# Patient Record
Sex: FEMALE | Race: BLACK OR AFRICAN AMERICAN | NOT HISPANIC OR LATINO | URBAN - METROPOLITAN AREA
[De-identification: names, ages, dates, MRNs, and addresses within clinical notes are randomized per-mention and may not be internally consistent; named-entity substitution may affect disease eponyms.]

---

## 2017-06-07 ENCOUNTER — EMERGENCY (EMERGENCY)
Facility: HOSPITAL | Age: 22
LOS: 1 days | Discharge: HOME | End: 2017-06-07
Attending: EMERGENCY MEDICINE | Admitting: EMERGENCY MEDICINE
Payer: MEDICAID

## 2017-06-07 VITALS
OXYGEN SATURATION: 100 % | SYSTOLIC BLOOD PRESSURE: 146 MMHG | TEMPERATURE: 99 F | RESPIRATION RATE: 18 BRPM | DIASTOLIC BLOOD PRESSURE: 98 MMHG | HEART RATE: 95 BPM

## 2017-06-07 VITALS
DIASTOLIC BLOOD PRESSURE: 99 MMHG | SYSTOLIC BLOOD PRESSURE: 150 MMHG | OXYGEN SATURATION: 100 % | TEMPERATURE: 99 F | HEART RATE: 100 BPM | RESPIRATION RATE: 16 BRPM

## 2017-06-07 PROBLEM — Z00.00 ENCOUNTER FOR PREVENTIVE HEALTH EXAMINATION: Status: ACTIVE | Noted: 2017-06-07

## 2017-06-07 LAB
ALBUMIN SERPL ELPH-MCNC: 4.3 G/DL — SIGNIFICANT CHANGE UP (ref 3.3–5)
ALP SERPL-CCNC: 47 U/L — SIGNIFICANT CHANGE UP (ref 40–120)
ALT FLD-CCNC: 21 U/L — SIGNIFICANT CHANGE UP (ref 4–33)
APPEARANCE UR: CLEAR — SIGNIFICANT CHANGE UP
AST SERPL-CCNC: 27 U/L — SIGNIFICANT CHANGE UP (ref 4–32)
BASOPHILS # BLD AUTO: 0.02 K/UL — SIGNIFICANT CHANGE UP (ref 0–0.2)
BASOPHILS NFR BLD AUTO: 0.3 % — SIGNIFICANT CHANGE UP (ref 0–2)
BILIRUB SERPL-MCNC: 0.7 MG/DL — SIGNIFICANT CHANGE UP (ref 0.2–1.2)
BILIRUB UR-MCNC: NEGATIVE — SIGNIFICANT CHANGE UP
BLOOD UR QL VISUAL: NEGATIVE — SIGNIFICANT CHANGE UP
BUN SERPL-MCNC: 11 MG/DL — SIGNIFICANT CHANGE UP (ref 7–23)
CALCIUM SERPL-MCNC: 9.6 MG/DL — SIGNIFICANT CHANGE UP (ref 8.4–10.5)
CHLORIDE SERPL-SCNC: 103 MMOL/L — SIGNIFICANT CHANGE UP (ref 98–107)
CO2 SERPL-SCNC: 21 MMOL/L — LOW (ref 22–31)
COLOR SPEC: YELLOW — SIGNIFICANT CHANGE UP
CREAT SERPL-MCNC: 0.82 MG/DL — SIGNIFICANT CHANGE UP (ref 0.5–1.3)
EOSINOPHIL # BLD AUTO: 0.01 K/UL — SIGNIFICANT CHANGE UP (ref 0–0.5)
EOSINOPHIL NFR BLD AUTO: 0.2 % — SIGNIFICANT CHANGE UP (ref 0–6)
GLUCOSE SERPL-MCNC: 108 MG/DL — HIGH (ref 70–99)
GLUCOSE UR-MCNC: NEGATIVE — SIGNIFICANT CHANGE UP
HCT VFR BLD CALC: 39.9 % — SIGNIFICANT CHANGE UP (ref 34.5–45)
HGB BLD-MCNC: 13.1 G/DL — SIGNIFICANT CHANGE UP (ref 11.5–15.5)
IMM GRANULOCYTES NFR BLD AUTO: 0.2 % — SIGNIFICANT CHANGE UP (ref 0–1.5)
KETONES UR-MCNC: SIGNIFICANT CHANGE UP
LEUKOCYTE ESTERASE UR-ACNC: NEGATIVE — SIGNIFICANT CHANGE UP
LIDOCAIN IGE QN: 42.6 U/L — SIGNIFICANT CHANGE UP (ref 7–60)
LYMPHOCYTES # BLD AUTO: 1.66 K/UL — SIGNIFICANT CHANGE UP (ref 1–3.3)
LYMPHOCYTES # BLD AUTO: 25.2 % — SIGNIFICANT CHANGE UP (ref 13–44)
MAGNESIUM SERPL-MCNC: 1.8 MG/DL — SIGNIFICANT CHANGE UP (ref 1.6–2.6)
MCHC RBC-ENTMCNC: 32.1 PG — SIGNIFICANT CHANGE UP (ref 27–34)
MCHC RBC-ENTMCNC: 32.8 % — SIGNIFICANT CHANGE UP (ref 32–36)
MCV RBC AUTO: 97.8 FL — SIGNIFICANT CHANGE UP (ref 80–100)
MONOCYTES # BLD AUTO: 0.51 K/UL — SIGNIFICANT CHANGE UP (ref 0–0.9)
MONOCYTES NFR BLD AUTO: 7.7 % — SIGNIFICANT CHANGE UP (ref 2–14)
MUCOUS THREADS # UR AUTO: SIGNIFICANT CHANGE UP
NEUTROPHILS # BLD AUTO: 4.39 K/UL — SIGNIFICANT CHANGE UP (ref 1.8–7.4)
NEUTROPHILS NFR BLD AUTO: 66.4 % — SIGNIFICANT CHANGE UP (ref 43–77)
NITRITE UR-MCNC: NEGATIVE — SIGNIFICANT CHANGE UP
PH UR: 5.5 — SIGNIFICANT CHANGE UP (ref 4.6–8)
PHOSPHATE SERPL-MCNC: 4 MG/DL — SIGNIFICANT CHANGE UP (ref 2.5–4.5)
PLATELET # BLD AUTO: 256 K/UL — SIGNIFICANT CHANGE UP (ref 150–400)
PMV BLD: 9.4 FL — SIGNIFICANT CHANGE UP (ref 7–13)
POTASSIUM SERPL-MCNC: 4 MMOL/L — SIGNIFICANT CHANGE UP (ref 3.5–5.3)
POTASSIUM SERPL-SCNC: 4 MMOL/L — SIGNIFICANT CHANGE UP (ref 3.5–5.3)
PROT SERPL-MCNC: 7.5 G/DL — SIGNIFICANT CHANGE UP (ref 6–8.3)
PROT UR-MCNC: 20 — SIGNIFICANT CHANGE UP
RBC # BLD: 4.08 M/UL — SIGNIFICANT CHANGE UP (ref 3.8–5.2)
RBC # FLD: 12.3 % — SIGNIFICANT CHANGE UP (ref 10.3–14.5)
RBC CASTS # UR COMP ASSIST: SIGNIFICANT CHANGE UP (ref 0–?)
SODIUM SERPL-SCNC: 140 MMOL/L — SIGNIFICANT CHANGE UP (ref 135–145)
SP GR SPEC: 1.03 — SIGNIFICANT CHANGE UP (ref 1–1.03)
SQUAMOUS # UR AUTO: SIGNIFICANT CHANGE UP
UROBILINOGEN FLD QL: NORMAL E.U. — SIGNIFICANT CHANGE UP (ref 0.1–0.2)
WBC # BLD: 6.6 K/UL — SIGNIFICANT CHANGE UP (ref 3.8–10.5)
WBC # FLD AUTO: 6.6 K/UL — SIGNIFICANT CHANGE UP (ref 3.8–10.5)
WBC UR QL: SIGNIFICANT CHANGE UP (ref 0–?)

## 2017-06-07 PROCEDURE — 99284 EMERGENCY DEPT VISIT MOD MDM: CPT | Mod: 25

## 2017-06-07 RX ORDER — FAMOTIDINE 10 MG/ML
20 INJECTION INTRAVENOUS ONCE
Qty: 0 | Refills: 0 | Status: COMPLETED | OUTPATIENT
Start: 2017-06-07 | End: 2017-06-07

## 2017-06-07 RX ORDER — LIDOCAINE 4 G/100G
10 CREAM TOPICAL ONCE
Qty: 0 | Refills: 0 | Status: COMPLETED | OUTPATIENT
Start: 2017-06-07 | End: 2017-06-07

## 2017-06-07 RX ORDER — ONDANSETRON 8 MG/1
4 TABLET, FILM COATED ORAL ONCE
Qty: 0 | Refills: 0 | Status: COMPLETED | OUTPATIENT
Start: 2017-06-07 | End: 2017-06-07

## 2017-06-07 RX ORDER — SODIUM CHLORIDE 9 MG/ML
1000 INJECTION INTRAMUSCULAR; INTRAVENOUS; SUBCUTANEOUS ONCE
Qty: 0 | Refills: 0 | Status: COMPLETED | OUTPATIENT
Start: 2017-06-07 | End: 2017-06-07

## 2017-06-07 RX ADMIN — SODIUM CHLORIDE 2000 MILLILITER(S): 9 INJECTION INTRAMUSCULAR; INTRAVENOUS; SUBCUTANEOUS at 03:09

## 2017-06-07 RX ADMIN — ONDANSETRON 4 MILLIGRAM(S): 8 TABLET, FILM COATED ORAL at 03:09

## 2017-06-07 RX ADMIN — LIDOCAINE 10 MILLILITER(S): 4 CREAM TOPICAL at 03:09

## 2017-06-07 RX ADMIN — Medication 30 MILLILITER(S): at 03:09

## 2017-06-07 RX ADMIN — FAMOTIDINE 20 MILLIGRAM(S): 10 INJECTION INTRAVENOUS at 03:09

## 2017-06-07 NOTE — ED PROVIDER NOTE - PLAN OF CARE
1. Your lab work was normal  2. Please continue to take pepcid and use maalox as needed  3. Please follow up with your GI doctor within a few days of discharge  4. If your symptoms persist or worsen please seek immediate medical care.

## 2017-06-07 NOTE — ED PROVIDER NOTE - CARE PLAN
Principal Discharge DX:	Gastritis  Instructions for follow-up, activity and diet:	1. Your lab work was normal  2. Please continue to take pepcid and use maalox as needed  3. Please follow up with your GI doctor within a few days of discharge  4. If your symptoms persist or worsen please seek immediate medical care.

## 2017-06-07 NOTE — ED PROVIDER NOTE - MEDICAL DECISION MAKING DETAILS
22F p/w worsening abdominal pain. DDx includes viral gastroenteritis, gastritis, less likely pancreatitis or appendicitis. IVF, Labs, GI cocktail

## 2017-06-07 NOTE — ED ADULT NURSE NOTE - OBJECTIVE STATEMENT
pt presents to ED R#26 Aox4, in NAD, c/o LUQ pain x2 weeks sharp constant nonradiating with 1 episode nausea and vomiting 2 weeks ago, resolved, still c/o nausea. States pain relieved by pepcid (rx by PMD) and application of heating pads. Denies fevers/ chills/ other acute medical complaints. VSS. IV inserted, BW collected and sent to lab. Meds administered as per EMAR. Awaiting test results. Will continue to monitor.

## 2017-06-07 NOTE — ED PROVIDER NOTE - ATTENDING CONTRIBUTION TO CARE
DR. ISSA, ATTENDING MD-  I performed a face to face bedside interview with patient regarding history of present illness, review of symptoms and past medical history. I completed an independent physical exam.  I have discussed patient's plan of care with the resident.   Documentation as above in the note.    23 y/o female c/o 2 wks of epigastric pain, nausea, no v/d.  Taking pepcid with little relief.  Worse when laying flat.  Denies f/c, ha, neck stiffness, cp, sob, cough, dysuria, rash.  Afebrile, vs wnl, nad, ctabil, s1s2 rrr no m/r/g, abd soft mild epig ttp no r/g, no cva tenderness b/l, no leg swelling b/l, no rash.  Gastritis vs gerd vs less likely pancreatitis.  Obtain cbc, cmp, lipase, upreg, give gi cocktail, reassess, antic dc home with close gi f/u.

## 2017-06-07 NOTE — ED PROVIDER NOTE - PROGRESS NOTE DETAILS
Pt states symptoms have improved. Labs are stable and pt to be dc'ed with out pt GI follow up. Mother and pt in agreement with plan.

## 2017-06-07 NOTE — ED ADULT TRIAGE NOTE - CHIEF COMPLAINT QUOTE
Pt c/o L abd pain x2 weeks.  Saw PMD and was given pepcid with some relief.  +nausea.  Denies vomiting or diarrhea.  Last BM this am.  States tea has helped her.  Appears comfortable presently

## 2017-06-07 NOTE — ED PROVIDER NOTE - OBJECTIVE STATEMENT
This is a 22F with no sig pmhx who p/w 2 weeks of worsening abdominal pain. Pt states initially had episode of vomiting with some blood noted. Pt also reports diarrhea which as resolved. She states went to her PMD and took pepcid with some relief but symptoms worsened so came to ED. She also states that today she feels like she has something stuck and cannot swallow. She denies fevers, chills, cp, sob, recent travel or sick contacts.

## 2021-10-27 ENCOUNTER — EMERGENCY (EMERGENCY)
Facility: HOSPITAL | Age: 26
LOS: 1 days | Discharge: HOME | End: 2021-10-27
Attending: STUDENT IN AN ORGANIZED HEALTH CARE EDUCATION/TRAINING PROGRAM | Admitting: STUDENT IN AN ORGANIZED HEALTH CARE EDUCATION/TRAINING PROGRAM
Payer: MEDICAID

## 2021-10-27 VITALS
TEMPERATURE: 98 F | HEART RATE: 90 BPM | RESPIRATION RATE: 18 BRPM | OXYGEN SATURATION: 100 % | SYSTOLIC BLOOD PRESSURE: 141 MMHG | DIASTOLIC BLOOD PRESSURE: 95 MMHG

## 2021-10-27 PROCEDURE — 74018 RADEX ABDOMEN 1 VIEW: CPT | Mod: 26

## 2021-10-27 PROCEDURE — 71046 X-RAY EXAM CHEST 2 VIEWS: CPT | Mod: 26

## 2021-10-27 PROCEDURE — 99284 EMERGENCY DEPT VISIT MOD MDM: CPT | Mod: 25

## 2021-10-27 PROCEDURE — 93010 ELECTROCARDIOGRAM REPORT: CPT

## 2021-10-27 NOTE — ED PROVIDER NOTE - PHYSICAL EXAMINATION
CONSTITUTIONAL: Non-toxic, non-diaphoretic, in no apparent distress  HEAD: Normocephalic; atruamatic  EYES: EOM intact   ENMT: External appears normal; normal oropharynx, moist  NECK: grossly normal active ROM,  CARD: No cyanosis, good peripheral perfusion, RRR  RESP: Normal chest excursion with respiration; no increased work of breathing  ABD: soft, nontender, and non-distended   EXT: moving all extremities, no gross disfigurement or asymmetry,  SKIN: Warm, dry, no rash  NEURO:  moving all extremities, no facial droop, no dysarthria      cn2-12 intact  5/5 all extremities

## 2021-10-27 NOTE — ED ADULT TRIAGE NOTE - CHIEF COMPLAINT QUOTE
pt c/o difficulty swallowing and belching x months. has seen multiple ENT and GI doctors, states unable to figure out what the problem is. pt reports weight loss.

## 2021-10-27 NOTE — ED PROVIDER NOTE - OBJECTIVE STATEMENT
27 y/o F presents to the ED with a cc of difficulty swallowing since April. Pt describes the feeling as it getting stopped midway. Pt also states she has been experiencing excessive burping, and acid coming up. Pt says softer foods make it worse, but solid is easier to go down, and liquids go down fine. Pt has met with GI, who conducted endoscopy, and two ENTs, who conducted a scope. Both results were normal. She is taking Omeprazole, prescribed by GI, which didn't help much, and is taking Sucralfate prescribed by ENT, which didn't result in desirable results. She is also taking Famotidine at night and Iron supplements. Patient denies oral contraceptives, smoking, drinking, drug use, fever, cough, diarrhea, and vomiting. Patient also states tightness on chest which comes and goes.

## 2021-10-27 NOTE — ED PROVIDER NOTE - CARE PROVIDER_API CALL
Ines Barker)  Gastroenterology; Internal Medicine  [address] [place] [address], [address]  [place] [address]  Phone: [phone]  Fax: [phone]  Follow Up Time: Urgent

## 2021-10-27 NOTE — ED PROVIDER NOTE - NSFOLLOWUPINSTRUCTIONS_ED_ALL_ED_FT
follow up with Dr. Barker as below.       Chronic Dysphagia    WHAT YOU NEED TO KNOW:    Chronic dysphagia is trouble swallowing. It occurs when you have trouble moving food or liquid down your esophagus to your stomach. It may occur when you eat, drink, or any time you try to swallow.    WHILE YOU ARE HERE:    Informed consent is a legal document that explains the tests, treatments, or procedures that you may need. Informed consent means you understand what will be done and can make decisions about what you want. You give your permission when you sign the consent form. You can have someone sign this form for you if you are not able to sign it. You have the right to understand your medical care in words you know. Before you sign the consent form, understand the risks and benefits of what will be done. Make sure all your questions are answered.    Medicines: You may need medicine to reduce acid reflux or muscle spasms in your throat.    Tests:    A water swallow screening test will show how well you swallow thinner liquids, such as water. Thinner liquids can make you choke more easily than thicker liquids. This test may show signs of dysphagia and aspiration. It can be used to help healthcare providers decide if you need other tests.    Other swallow tests may show which parts of your throat or esophagus are not working well. These tests may include x-rays of your throat and esophagus. You may be given a thick liquid called barium to help your esophagus show up better on x-rays. These tests may also show if the position of your head affects the way you swallow.    Endoscopy is a procedure that may show narrowing or inflammation in your esophagus.    Manometry measures the pressure within the esophagus and stomach.    pH monitoring is used to check your throat for acid reflux.  Treatment: Surgery may be needed to widen your esophagus or treat other medical conditions that cause dysphagia.    RISKS:    You have an increased risk for aspiration (movement of liquid and food into your lungs). Aspiration can cause frequent colds or lung infections. You may not get the nutrition you need. You may also become dehydrated. Your symptoms may become more severe or life-threatening.    CARE AGREEMENT:    You have the right to help plan your care. Learn about your health condition and how it may be treated. Discuss treatment options with your healthcare providers to decide what care you want to receive. You always have the right to refuse treatment.

## 2021-10-27 NOTE — ED PROVIDER NOTE - CLINICAL SUMMARY MEDICAL DECISION MAKING FREE TEXT BOX
25 y/o F presents to the ED with dysphagia since April. Will check EKG, chest X-ray, and outpatient followup for manometry.

## 2021-10-27 NOTE — ED PROVIDER NOTE - PATIENT PORTAL LINK FT
You can access the FollowMyHealth Patient Portal offered by [facility] by registering at the following website: [email or web address]. By joining [employer]’s FollowMyHealth portal, you will also be able to view your health information using other applications (apps) compatible with our system.

## 2022-02-02 ENCOUNTER — APPOINTMENT (OUTPATIENT)
Dept: GASTROENTEROLOGY | Facility: CLINIC | Age: 27
End: 2022-02-02

## 2022-04-29 NOTE — ED ADULT TRIAGE NOTE - PATIENT ON (OXYGEN DELIVERY METHOD)
What Type Of Note Output Would You Prefer (Optional)?: Bullet Format Hpi Title: Evaluation of Skin Lesions Additional History: Acne, cyst by left ear room air

## 2024-11-10 ENCOUNTER — EMERGENCY (EMERGENCY)
Facility: HOSPITAL | Age: 29
LOS: 1 days | Discharge: HOME | End: 2024-11-10
Attending: STUDENT IN AN ORGANIZED HEALTH CARE EDUCATION/TRAINING PROGRAM | Admitting: STUDENT IN AN ORGANIZED HEALTH CARE EDUCATION/TRAINING PROGRAM
Payer: COMMERCIAL

## 2024-11-10 VITALS
DIASTOLIC BLOOD PRESSURE: 112 MMHG | TEMPERATURE: 100 F | HEIGHT: 67 IN | SYSTOLIC BLOOD PRESSURE: 165 MMHG | HEART RATE: 122 BPM | OXYGEN SATURATION: 95 % | WEIGHT: 205.03 LBS | RESPIRATION RATE: 16 BRPM

## 2024-11-10 VITALS
HEART RATE: 108 BPM | DIASTOLIC BLOOD PRESSURE: 100 MMHG | TEMPERATURE: 99 F | RESPIRATION RATE: 16 BRPM | SYSTOLIC BLOOD PRESSURE: 153 MMHG | OXYGEN SATURATION: 100 %

## 2024-11-10 LAB
ALBUMIN SERPL ELPH-MCNC: 3.3 G/DL — SIGNIFICANT CHANGE UP (ref 3.3–5)
ALP SERPL-CCNC: 55 U/L — SIGNIFICANT CHANGE UP (ref 40–120)
ALT FLD-CCNC: 10 U/L — SIGNIFICANT CHANGE UP (ref 4–33)
ANION GAP SERPL CALC-SCNC: 12 MMOL/L — SIGNIFICANT CHANGE UP (ref 7–14)
ANISOCYTOSIS BLD QL: SLIGHT — SIGNIFICANT CHANGE UP
APPEARANCE UR: ABNORMAL
AST SERPL-CCNC: 34 U/L — HIGH (ref 4–32)
BACTERIA # UR AUTO: ABNORMAL /HPF
BASOPHILS # BLD AUTO: 0 K/UL — SIGNIFICANT CHANGE UP (ref 0–0.2)
BASOPHILS NFR BLD AUTO: 0 % — SIGNIFICANT CHANGE UP (ref 0–2)
BILIRUB SERPL-MCNC: 0.6 MG/DL — SIGNIFICANT CHANGE UP (ref 0.2–1.2)
BILIRUB UR-MCNC: NEGATIVE — SIGNIFICANT CHANGE UP
BUN SERPL-MCNC: 8 MG/DL — SIGNIFICANT CHANGE UP (ref 7–23)
CALCIUM SERPL-MCNC: 8.6 MG/DL — SIGNIFICANT CHANGE UP (ref 8.4–10.5)
CAST: 24 /LPF — HIGH (ref 0–4)
CHLORIDE SERPL-SCNC: 103 MMOL/L — SIGNIFICANT CHANGE UP (ref 98–107)
CO2 SERPL-SCNC: 24 MMOL/L — SIGNIFICANT CHANGE UP (ref 22–31)
COLOR SPEC: ABNORMAL
CREAT SERPL-MCNC: 0.97 MG/DL — SIGNIFICANT CHANGE UP (ref 0.5–1.3)
D DIMER BLD IA.RAPID-MCNC: 1299 NG/ML DDU — HIGH
DIFF PNL FLD: ABNORMAL
EGFR: 80 ML/MIN/1.73M2 — SIGNIFICANT CHANGE UP
EOSINOPHIL # BLD AUTO: 0 K/UL — SIGNIFICANT CHANGE UP (ref 0–0.5)
EOSINOPHIL NFR BLD AUTO: 0 % — SIGNIFICANT CHANGE UP (ref 0–6)
FINE GRAN CASTS #/AREA URNS AUTO: PRESENT
FLUAV AG NPH QL: SIGNIFICANT CHANGE UP
FLUBV AG NPH QL: SIGNIFICANT CHANGE UP
GAS PNL BLDV: SIGNIFICANT CHANGE UP
GLUCOSE SERPL-MCNC: 107 MG/DL — HIGH (ref 70–99)
GLUCOSE UR QL: NEGATIVE MG/DL — SIGNIFICANT CHANGE UP
HCG SERPL-ACNC: <1 MIU/ML — SIGNIFICANT CHANGE UP
HCT VFR BLD CALC: 29.9 % — LOW (ref 34.5–45)
HGB BLD-MCNC: 9.7 G/DL — LOW (ref 11.5–15.5)
HYALINE CASTS # UR AUTO: PRESENT
IANC: 4.8 K/UL — SIGNIFICANT CHANGE UP (ref 1.8–7.4)
KETONES UR-MCNC: NEGATIVE MG/DL — SIGNIFICANT CHANGE UP
LEUKOCYTE ESTERASE UR-ACNC: ABNORMAL
LIDOCAIN IGE QN: 51 U/L — SIGNIFICANT CHANGE UP (ref 7–60)
LYMPHOCYTES # BLD AUTO: 0.57 K/UL — LOW (ref 1–3.3)
LYMPHOCYTES # BLD AUTO: 8.8 % — LOW (ref 13–44)
MACROCYTES BLD QL: SLIGHT — SIGNIFICANT CHANGE UP
MANUAL SMEAR VERIFICATION: SIGNIFICANT CHANGE UP
MCHC RBC-ENTMCNC: 31.4 PG — SIGNIFICANT CHANGE UP (ref 27–34)
MCHC RBC-ENTMCNC: 32.4 G/DL — SIGNIFICANT CHANGE UP (ref 32–36)
MCV RBC AUTO: 96.8 FL — SIGNIFICANT CHANGE UP (ref 80–100)
MONOCYTES # BLD AUTO: 0.46 K/UL — SIGNIFICANT CHANGE UP (ref 0–0.9)
MONOCYTES NFR BLD AUTO: 7.1 % — SIGNIFICANT CHANGE UP (ref 2–14)
NEUTROPHILS # BLD AUTO: 5.23 K/UL — SIGNIFICANT CHANGE UP (ref 1.8–7.4)
NEUTROPHILS NFR BLD AUTO: 81.4 % — HIGH (ref 43–77)
NITRITE UR-MCNC: NEGATIVE — SIGNIFICANT CHANGE UP
OVALOCYTES BLD QL SMEAR: SLIGHT — SIGNIFICANT CHANGE UP
PH UR: 6 — SIGNIFICANT CHANGE UP (ref 5–8)
PLAT MORPH BLD: ABNORMAL
PLATELET # BLD AUTO: 209 K/UL — SIGNIFICANT CHANGE UP (ref 150–400)
PLATELET COUNT - ESTIMATE: NORMAL — SIGNIFICANT CHANGE UP
POIKILOCYTOSIS BLD QL AUTO: SLIGHT — SIGNIFICANT CHANGE UP
POLYCHROMASIA BLD QL SMEAR: SIGNIFICANT CHANGE UP
POTASSIUM SERPL-MCNC: 3.5 MMOL/L — SIGNIFICANT CHANGE UP (ref 3.5–5.3)
POTASSIUM SERPL-SCNC: 3.5 MMOL/L — SIGNIFICANT CHANGE UP (ref 3.5–5.3)
PROT SERPL-MCNC: 7.5 G/DL — SIGNIFICANT CHANGE UP (ref 6–8.3)
PROT UR-MCNC: >=1000 MG/DL
RBC # BLD: 3.09 M/UL — LOW (ref 3.8–5.2)
RBC # FLD: 12.4 % — SIGNIFICANT CHANGE UP (ref 10.3–14.5)
RBC BLD AUTO: ABNORMAL
RBC CASTS # UR COMP ASSIST: 444 /HPF — HIGH (ref 0–4)
REVIEW: SIGNIFICANT CHANGE UP
RSV RNA NPH QL NAA+NON-PROBE: SIGNIFICANT CHANGE UP
SARS-COV-2 RNA SPEC QL NAA+PROBE: SIGNIFICANT CHANGE UP
SMUDGE CELLS # BLD: PRESENT — SIGNIFICANT CHANGE UP
SODIUM SERPL-SCNC: 139 MMOL/L — SIGNIFICANT CHANGE UP (ref 135–145)
SP GR SPEC: 1.02 — SIGNIFICANT CHANGE UP (ref 1–1.03)
SQUAMOUS # UR AUTO: 5 /HPF — SIGNIFICANT CHANGE UP (ref 0–5)
TROPONIN T, HIGH SENSITIVITY RESULT: 34 NG/L — SIGNIFICANT CHANGE UP
TROPONIN T, HIGH SENSITIVITY RESULT: 37 NG/L — SIGNIFICANT CHANGE UP
UROBILINOGEN FLD QL: 1 MG/DL — SIGNIFICANT CHANGE UP (ref 0.2–1)
VARIANT LYMPHS # BLD: 2.7 % — SIGNIFICANT CHANGE UP (ref 0–6)
WBC # BLD: 6.43 K/UL — SIGNIFICANT CHANGE UP (ref 3.8–10.5)
WBC # FLD AUTO: 6.43 K/UL — SIGNIFICANT CHANGE UP (ref 3.8–10.5)
WBC UR QL: 17 /HPF — HIGH (ref 0–5)

## 2024-11-10 PROCEDURE — 71046 X-RAY EXAM CHEST 2 VIEWS: CPT | Mod: 26

## 2024-11-10 PROCEDURE — 71275 CT ANGIOGRAPHY CHEST: CPT | Mod: 26,MC

## 2024-11-10 PROCEDURE — 93010 ELECTROCARDIOGRAM REPORT: CPT

## 2024-11-10 PROCEDURE — 99285 EMERGENCY DEPT VISIT HI MDM: CPT

## 2024-11-10 RX ORDER — ONDANSETRON HYDROCHLORIDE 2 MG/ML
1 INJECTION, SOLUTION INTRAMUSCULAR; INTRAVENOUS
Qty: 1 | Refills: 0
Start: 2024-11-10 | End: 2024-11-14

## 2024-11-10 RX ORDER — SODIUM CHLORIDE 9 MG/ML
1000 INJECTION, SOLUTION INTRAMUSCULAR; INTRAVENOUS; SUBCUTANEOUS ONCE
Refills: 0 | Status: COMPLETED | OUTPATIENT
Start: 2024-11-10 | End: 2024-11-10

## 2024-11-10 RX ADMIN — SODIUM CHLORIDE 1000 MILLILITER(S): 9 INJECTION, SOLUTION INTRAMUSCULAR; INTRAVENOUS; SUBCUTANEOUS at 03:21

## 2024-11-10 NOTE — ED PROVIDER NOTE - PHYSICAL EXAMINATION
GENERAL: Awake, alert, increased wob,  HEENT: NC/AT, moist mucous membranes, no tonsillar edema or exudates   LUNGS: CTAB, no wheezes or crackles   CARDIAC: RRR, no m/r/g  ABDOMEN: Soft, non tender, non distended, no rebound, no guarding  BACK: No midline spinal tenderness, no CVA tenderness  EXT: No edema, no calf tenderness,   NEURO: A&Ox3. Moving all extremities.  SKIN: Warm and dry. No rash.  PSYCH: Normal affect.

## 2024-11-10 NOTE — ED ADULT NURSE NOTE - OBJECTIVE STATEMENT
FEVER Pt received in room 22. Pt alert and oriented x 4, ambulatory at baseline. Hx of HTN, GERD. Pt c/o fever, generalized body aches,  and sore throat that began 4 days ago. Pt visited urgent care and US showed protein and blood, pt reports being started on antibiotics for UTI. Sinus tachycardia on the monitor. Respirations even and unlabored, NAD. Pt denies chest pain, shortness of breath, N/V/D, headache, dizziness, weakness. 20G IV in the right AC, labs drawn per MD orders.

## 2024-11-10 NOTE — ED PROVIDER NOTE - PATIENT PORTAL LINK FT
You can access the FollowMyHealth Patient Portal offered by [facility] by registering at the following website: [email or web address]. By joining [employer]’s FollowMyHealth portal, you will also be able to view your health information using other applications (apps) compatible with our system.

## 2024-11-10 NOTE — ED PROVIDER NOTE - NSFOLLOWUPINSTRUCTIONS_ED_ALL_ED_FT
You were seen in the emergency department today and evaluated for lower abdominal pain and body aches.  We obtained lab work which revealed normal kidney function, low hemoglobin indicating anemia.  CT scan revealed no signs of pulmonary embolism, it did reveal a small fluid collection around your spleen. No significant evidence of fluid overload.  Please continue your antibiotics, we will prescribe Zofran to take 30 minutes before taking antibiotics.   We will put in a referral for nephrology and gastroenterology for further workup.   Please return to the ED if you have chest pain, worsening shortness of breath, calf pain, decreased or increased urination, leg swelling

## 2024-11-10 NOTE — ED PROVIDER NOTE - CLINICAL SUMMARY MEDICAL DECISION MAKING FREE TEXT BOX
29-year-old female no past medical history presenting to the emergency department with abdominal pain, body aches, fevers. seen at [facility] ED on Tuesday.  Urinalysis reveals UTI and she has been on cefpodoxime since, has 5 pills left. CT negative at that time. Symptoms have not improved. Patient is tachy to 122, temp 99.6, O2 sat 95%. Patient is well appearing, but with obvious increased work of breathing. Abdomen is non-tender to palpation, no CVA tenderness, no tonsillar edema or exudates. Lungs clear to auscultation bilaterally. Given mild hypoxia and tachy mild concern for PE will obtain d-dimer. Differential includes but is not limited to UTI, coivd, flu,PE. No abdominal imaging necessary at this time as patient is non-tender to palpation with negative CT 5 days ago. Will obtain infectious workup.

## 2024-11-10 NOTE — ED PROVIDER NOTE - OBJECTIVE STATEMENT
29-year-old female no past medical history presenting to the emergency department with abdominal pain, body aches, fevers.  Patient was seen at [facility] ED on Tuesday.  Urinalysis reveals UTI and she has been on cefpodoxime since, has 5 pills left.  They obtained a CT which was negative.  She endorses continued lower abdominal pain, body aches, throat pain.  She endorses increased shortness of breath. Denies chest pain, calf pain, N/V, dysuria, hematuria, vaginal bleeding, vaginal discharge, cough.

## 2024-11-10 NOTE — ED PROVIDER NOTE - PROGRESS NOTE DETAILS
Madeline Beard DO (PGY-1): Dimer elevated. Will obtain CTA. Trop 34 will obtian 1 hour repeat. EKG with T wave inversions in lateral leads, no st segment elevation. Urine positive for >= 1000 protein, 444 RBC, hyaline and fine granular casts. Creatinine wnl. concern for renal process. Madeline Beard DO (PGY-1): CT reveals no PE. small amount of perisplenic ascites. Patient denies upper abdominal pain. Will give return precautions and follow up with nephrology and GI.

## 2024-11-10 NOTE — ED ADULT NURSE NOTE - NSFALLUNIVINTERV_ED_ALL_ED
Bed/Stretcher in lowest position, wheels locked, appropriate side rails in place/Call bell, personal items and telephone in reach/Instruct patient to call for assistance before getting out of bed/chair/stretcher/Non-slip footwear applied when patient is off stretcher/[place] to call system/Physically safe environment - no spills, clutter or unnecessary equipment/Purposeful proactive rounding/Room/bathroom lighting operational, light cord in reach

## 2024-11-10 NOTE — ED ADULT TRIAGE NOTE - CHIEF COMPLAINT QUOTE
pt c/o fever and muscle aches on Tuesday, pt went to urgent care and UA showed protein and blood - was started on antibiotics for UTI. pt developing abdominal pain and also reports her pressure is high. hx HTN (previously on meds), GERD

## 2024-11-10 NOTE — ED ADULT NURSE NOTE - TEMPLATE LIST FOR HEAD TO TOE ASSESSMENT
Patient Portal,  4/4/2024 7:41 AM CDT    Sure can. I'll will let you know when I can next week. Please place the orders and I will get over to [place] next week and take care of that.   General

## 2024-11-10 NOTE — ED PROVIDER NOTE - ATTENDING CONTRIBUTION TO CARE
29-year-old female no past medical history presents emergency department with bodyaches and chills as above.  Of note is already being treated for UTI with cefpodoxime.  Also had an episode of chest pain.  Patient was well-appearing, lungs clear to auscultation bilaterally, no respiratory distress, borderline tachycardia.  Was neurologically intact 5 out of 5 in all extremities.  Abdomen soft, CBC with anemia, was otherwise hemodynamically stable and had no reported melena.  CMP nonactionable, blood gas with normal lactate.  Urine with proteinuria WBCs please note already being treated for UTI.  X-ray was negative, CT angio chest was negative for PE but she did have some perisplenic ascites possibly renal in nature she will follow-up nephrology.  Return precautions reviewed given proteinuria was given nephrology follow-up as an outpatient.

## 2024-11-11 LAB
CULTURE RESULTS: SIGNIFICANT CHANGE UP
SPECIMEN SOURCE: SIGNIFICANT CHANGE UP

## 2024-11-15 ENCOUNTER — INPATIENT (INPATIENT)
Facility: HOSPITAL | Age: 29
LOS: 6 days | Discharge: HOME | End: 2024-11-22
Attending: STUDENT IN AN ORGANIZED HEALTH CARE EDUCATION/TRAINING PROGRAM | Admitting: STUDENT IN AN ORGANIZED HEALTH CARE EDUCATION/TRAINING PROGRAM
Payer: COMMERCIAL

## 2024-11-15 VITALS
SYSTOLIC BLOOD PRESSURE: 126 MMHG | HEART RATE: 101 BPM | DIASTOLIC BLOOD PRESSURE: 78 MMHG | WEIGHT: 199.96 LBS | TEMPERATURE: 99 F | HEIGHT: 67 IN | RESPIRATION RATE: 16 BRPM | OXYGEN SATURATION: 100 %

## 2024-11-15 DIAGNOSIS — R50.9 FEVER, UNSPECIFIED: ICD-10-CM

## 2024-11-15 LAB
ADD ON TEST-SPECIMEN IN LAB: SIGNIFICANT CHANGE UP
ALBUMIN SERPL ELPH-MCNC: 3.4 G/DL — SIGNIFICANT CHANGE UP (ref 3.3–5)
ALP SERPL-CCNC: 58 U/L — SIGNIFICANT CHANGE UP (ref 40–120)
ALT FLD-CCNC: 17 U/L — SIGNIFICANT CHANGE UP (ref 4–33)
ANION GAP SERPL CALC-SCNC: 12 MMOL/L — SIGNIFICANT CHANGE UP (ref 7–14)
ANISOCYTOSIS BLD QL: SLIGHT — SIGNIFICANT CHANGE UP
APPEARANCE UR: ABNORMAL
AST SERPL-CCNC: 40 U/L — HIGH (ref 4–32)
BACTERIA # UR AUTO: ABNORMAL /HPF
BASOPHILS # BLD AUTO: 0 K/UL — SIGNIFICANT CHANGE UP (ref 0–0.2)
BASOPHILS NFR BLD AUTO: 0 % — SIGNIFICANT CHANGE UP (ref 0–2)
BILIRUB SERPL-MCNC: 0.5 MG/DL — SIGNIFICANT CHANGE UP (ref 0.2–1.2)
BILIRUB UR-MCNC: NEGATIVE — SIGNIFICANT CHANGE UP
BUN SERPL-MCNC: 10 MG/DL — SIGNIFICANT CHANGE UP (ref 7–23)
CALCIUM SERPL-MCNC: 8.7 MG/DL — SIGNIFICANT CHANGE UP (ref 8.4–10.5)
CAST: 28 /LPF — HIGH (ref 0–4)
CHLORIDE SERPL-SCNC: 101 MMOL/L — SIGNIFICANT CHANGE UP (ref 98–107)
CO2 SERPL-SCNC: 25 MMOL/L — SIGNIFICANT CHANGE UP (ref 22–31)
COLOR SPEC: YELLOW — SIGNIFICANT CHANGE UP
CREAT SERPL-MCNC: 1.33 MG/DL — HIGH (ref 0.5–1.3)
DIFF PNL FLD: ABNORMAL
EGFR: 55 ML/MIN/1.73M2 — LOW
EOSINOPHIL # BLD AUTO: 0.05 K/UL — SIGNIFICANT CHANGE UP (ref 0–0.5)
EOSINOPHIL NFR BLD AUTO: 0.9 % — SIGNIFICANT CHANGE UP (ref 0–6)
FLUAV AG NPH QL: SIGNIFICANT CHANGE UP
FLUBV AG NPH QL: SIGNIFICANT CHANGE UP
GIANT PLATELETS BLD QL SMEAR: PRESENT — SIGNIFICANT CHANGE UP
GLUCOSE SERPL-MCNC: 84 MG/DL — SIGNIFICANT CHANGE UP (ref 70–99)
GLUCOSE UR QL: NEGATIVE MG/DL — SIGNIFICANT CHANGE UP
HCT VFR BLD CALC: 29.8 % — LOW (ref 34.5–45)
HGB BLD-MCNC: 9.5 G/DL — LOW (ref 11.5–15.5)
IANC: 3.24 K/UL — SIGNIFICANT CHANGE UP (ref 1.8–7.4)
KETONES UR-MCNC: ABNORMAL MG/DL
LEUKOCYTE ESTERASE UR-ACNC: ABNORMAL
LIDOCAIN IGE QN: 108 U/L — HIGH (ref 7–60)
LYMPHOCYTES # BLD AUTO: 1.45 K/UL — SIGNIFICANT CHANGE UP (ref 1–3.3)
LYMPHOCYTES # BLD AUTO: 27 % — SIGNIFICANT CHANGE UP (ref 13–44)
MACROCYTES BLD QL: SLIGHT — SIGNIFICANT CHANGE UP
MCHC RBC-ENTMCNC: 31.4 PG — SIGNIFICANT CHANGE UP (ref 27–34)
MCHC RBC-ENTMCNC: 31.9 G/DL — LOW (ref 32–36)
MCV RBC AUTO: 98.3 FL — SIGNIFICANT CHANGE UP (ref 80–100)
MONOCYTES # BLD AUTO: 0.28 K/UL — SIGNIFICANT CHANGE UP (ref 0–0.9)
MONOCYTES NFR BLD AUTO: 5.2 % — SIGNIFICANT CHANGE UP (ref 2–14)
NEUTROPHILS # BLD AUTO: 3.37 K/UL — SIGNIFICANT CHANGE UP (ref 1.8–7.4)
NEUTROPHILS NFR BLD AUTO: 62.6 % — SIGNIFICANT CHANGE UP (ref 43–77)
NITRITE UR-MCNC: NEGATIVE — SIGNIFICANT CHANGE UP
OVALOCYTES BLD QL SMEAR: SLIGHT — SIGNIFICANT CHANGE UP
PH UR: 5.5 — SIGNIFICANT CHANGE UP (ref 5–8)
PLAT MORPH BLD: NORMAL — SIGNIFICANT CHANGE UP
PLATELET # BLD AUTO: 207 K/UL — SIGNIFICANT CHANGE UP (ref 150–400)
PLATELET COUNT - ESTIMATE: NORMAL — SIGNIFICANT CHANGE UP
POLYCHROMASIA BLD QL SMEAR: SLIGHT — SIGNIFICANT CHANGE UP
POTASSIUM SERPL-MCNC: 3.7 MMOL/L — SIGNIFICANT CHANGE UP (ref 3.5–5.3)
POTASSIUM SERPL-SCNC: 3.7 MMOL/L — SIGNIFICANT CHANGE UP (ref 3.5–5.3)
PROT SERPL-MCNC: 8 G/DL — SIGNIFICANT CHANGE UP (ref 6–8.3)
PROT UR-MCNC: 300 MG/DL
RBC # BLD: 3.03 M/UL — LOW (ref 3.8–5.2)
RBC # FLD: 12.8 % — SIGNIFICANT CHANGE UP (ref 10.3–14.5)
RBC BLD AUTO: ABNORMAL
RBC CASTS # UR COMP ASSIST: 126 /HPF — HIGH (ref 0–4)
REVIEW: SIGNIFICANT CHANGE UP
RSV RNA NPH QL NAA+NON-PROBE: SIGNIFICANT CHANGE UP
SARS-COV-2 RNA SPEC QL NAA+PROBE: SIGNIFICANT CHANGE UP
SMUDGE CELLS # BLD: PRESENT — SIGNIFICANT CHANGE UP
SODIUM SERPL-SCNC: 138 MMOL/L — SIGNIFICANT CHANGE UP (ref 135–145)
SP GR SPEC: 1.02 — SIGNIFICANT CHANGE UP (ref 1–1.03)
SQUAMOUS # UR AUTO: 14 /HPF — HIGH (ref 0–5)
TROPONIN T, HIGH SENSITIVITY RESULT: 19 NG/L — SIGNIFICANT CHANGE UP
TROPONIN T, HIGH SENSITIVITY RESULT: 19 NG/L — SIGNIFICANT CHANGE UP
UROBILINOGEN FLD QL: 1 MG/DL — SIGNIFICANT CHANGE UP (ref 0.2–1)
VARIANT LYMPHS # BLD: 4.3 % — SIGNIFICANT CHANGE UP (ref 0–6)
WBC # BLD: 5.38 K/UL — SIGNIFICANT CHANGE UP (ref 3.8–10.5)
WBC # FLD AUTO: 5.38 K/UL — SIGNIFICANT CHANGE UP (ref 3.8–10.5)
WBC UR QL: 15 /HPF — HIGH (ref 0–5)

## 2024-11-15 PROCEDURE — 99285 EMERGENCY DEPT VISIT HI MDM: CPT

## 2024-11-15 PROCEDURE — 99223 1ST HOSP IP/OBS HIGH 75: CPT | Mod: GC

## 2024-11-15 PROCEDURE — 74177 CT ABD & PELVIS W/CONTRAST: CPT | Mod: 26,MC

## 2024-11-15 PROCEDURE — 76830 TRANSVAGINAL US NON-OB: CPT | Mod: 26

## 2024-11-15 RX ORDER — INFLUENZA VIRUS VACCINE 15; 15; 15; 15 UG/.5ML; UG/.5ML; UG/.5ML; UG/.5ML
0.5 SUSPENSION INTRAMUSCULAR ONCE
Refills: 0 | Status: DISCONTINUED | OUTPATIENT
Start: 2024-11-15 | End: 2024-11-22

## 2024-11-15 RX ORDER — ACETAMINOPHEN 500MG 500 MG/1
650 TABLET, COATED ORAL EVERY 6 HOURS
Refills: 0 | Status: DISCONTINUED | OUTPATIENT
Start: 2024-11-15 | End: 2024-11-22

## 2024-11-15 RX ORDER — MAGNESIUM, ALUMINUM HYDROXIDE 200-225/5
30 SUSPENSION, ORAL (FINAL DOSE FORM) ORAL EVERY 4 HOURS
Refills: 0 | Status: DISCONTINUED | OUTPATIENT
Start: 2024-11-15 | End: 2024-11-21

## 2024-11-15 RX ORDER — IBUPROFEN 200 MG
400 TABLET ORAL ONCE
Refills: 0 | Status: COMPLETED | OUTPATIENT
Start: 2024-11-15 | End: 2024-11-15

## 2024-11-15 RX ORDER — SODIUM CHLORIDE 9 MG/ML
1000 INJECTION, SOLUTION INTRAMUSCULAR; INTRAVENOUS; SUBCUTANEOUS
Refills: 0 | Status: DISCONTINUED | OUTPATIENT
Start: 2024-11-15 | End: 2024-11-16

## 2024-11-15 RX ORDER — ACETAMINOPHEN, DIPHENHYDRAMINE HCL, PHENYLEPHRINE HCL 325; 25; 5 MG/1; MG/1; MG/1
3 TABLET ORAL AT BEDTIME
Refills: 0 | Status: DISCONTINUED | OUTPATIENT
Start: 2024-11-15 | End: 2024-11-22

## 2024-11-15 RX ORDER — SODIUM CHLORIDE 9 MG/ML
1000 INJECTION, SOLUTION INTRAMUSCULAR; INTRAVENOUS; SUBCUTANEOUS
Refills: 0 | Status: DISCONTINUED | OUTPATIENT
Start: 2024-11-15 | End: 2024-11-15

## 2024-11-15 RX ORDER — SODIUM CHLORIDE 9 MG/ML
1000 INJECTION, SOLUTION INTRAMUSCULAR; INTRAVENOUS; SUBCUTANEOUS ONCE
Refills: 0 | Status: COMPLETED | OUTPATIENT
Start: 2024-11-15 | End: 2024-11-15

## 2024-11-15 RX ORDER — ACETAMINOPHEN 500MG 500 MG/1
1000 TABLET, COATED ORAL ONCE
Refills: 0 | Status: COMPLETED | OUTPATIENT
Start: 2024-11-15 | End: 2024-11-15

## 2024-11-15 RX ADMIN — Medication 400 MILLIGRAM(S): at 18:53

## 2024-11-15 RX ADMIN — SODIUM CHLORIDE 100 MILLILITER(S): 9 INJECTION, SOLUTION INTRAMUSCULAR; INTRAVENOUS; SUBCUTANEOUS at 21:45

## 2024-11-15 RX ADMIN — ACETAMINOPHEN 500MG 400 MILLIGRAM(S): 500 TABLET, COATED ORAL at 18:53

## 2024-11-15 RX ADMIN — SODIUM CHLORIDE 1000 MILLILITER(S): 9 INJECTION, SOLUTION INTRAMUSCULAR; INTRAVENOUS; SUBCUTANEOUS at 15:29

## 2024-11-15 NOTE — ED ADULT TRIAGE NOTE - CHIEF COMPLAINT QUOTE
Pt c/o fever, chills, headache x 2 weeks, seen here this past weekend was told she had UTI and prescribed antibiotics, reports finished treatment and still c/o fever, chills, lower abd pain and hematuria.

## 2024-11-15 NOTE — ED PROVIDER NOTE - ATTENDING CONTRIBUTION TO CARE
Brief HPI:  29-year-old female no significant past medical history, previous hospitalization for fever of unknown origin, presents for fever, chills, lower abdominal pain for 2 weeks.  Patient seen in outside ED 11/5/2024, treated with antibiotics for UTI.  Presented to [facility] 11/10/2024 for similar symptoms and had negative CT PE study for elevated D-dimer.  Urine culture negative from that visit.  Patient presents today for persistent symptoms.    Vitals:   Reviewed    Exam:    GEN:  Non-toxic appearing, non-distressed, speaking full sentences, non-diaphoretic, AAOx3  HEENT:  NCAT, neck supple, EOMI, PERRLA, sclera anicteric, no conjunctival pallor or injection, no stridor, normal voice, no tonsillar exudate, uvula midline  CV:  regular rhythm and rate, s1/s2 audible, no murmurs, rubs or gallops, peripheral pulses 2+ and symmetric  PULM:  non-labored respirations, lungs clear to auscultation bilaterally, no wheezes, crackles or rales  ABD:  non distended, non-tender, no rebound, no guarding, negative Sebastian's sign, bowel sounds normal, no cvat  MSK:  no gross deformity, non-tender extremities and joints, range of motion grossly normal appearing, no extremity edema, extremities warm and well perfused   NEURO:  AAOx3, CN II-XII intact, motor 5/5 in upper and lower extremities bilaterally, sensation grossly intact in extremities and trunk, finger to nose testing wnl, no nystagmus, negative Romberg, no pronator drift, no gait deficit  SKIN:  warm, dry, no rash or vesicles     A/P:   29-year-old female no significant past medical history, previous hospitalization for fever of unknown origin, presents for fever, chills, lower abdominal pain for 2 weeks.  Febrile on arrival.  Reports lower abdominal pain but abdomen nontender.  Will obtain labs, CT abdomen pelvis, urine studies, blood cultures.  Disposition pending.

## 2024-11-15 NOTE — ED ADULT NURSE NOTE - OBJECTIVE STATEMENT
Pt received in wellness area A&OX3. Pt c/o fever, chills, headache x 2 weeks, seen here this past weekend was told she had UTI and prescribed antibiotics, reports finished treatment and still c/o fever, chills, lower abd pain, dark colored urine. resp even and unlabored. 20G IV placed to L AC. labs drawn and sent. ongoing eval in progress.

## 2024-11-15 NOTE — ED PROVIDER NOTE - CLINICAL SUMMARY MEDICAL DECISION MAKING FREE TEXT BOX
30 y/o female  w/ no pmh reports today c/o lower abdominal pain. subjective fever and chills X apprx 2 weeks. Pt seen in [facility] ED on 11/5; was told she has a UTI, started on cefpodixime but pt is unsure if urine cx was sent. Pt seen at [facility] on 11/10/24 for similar sx- had a elevated D Dimer, negative CTPE but ct a/p showed small amount of perisplenic fluid. There was a concern for a possibly nephrology issue on 11/10/24 d/t elevated protein >1000 in urine but was told to f/u outpatient w/ nephrology. Pt states she has a nephrology appointment on 11/19/24 but did not schedule the GI appointment as yet. She states her sx have not worsened but she is concerned d/t the time period of her sx. Pain is currently 3/10 w/ no radiation    ddx- unresolved uti, gastritis, appendicitis     plan for pt- labs, ekg, ct a/p, ua  offered pain meds- pt declined  she is resting comfortably- will reassess.

## 2024-11-15 NOTE — ED PROVIDER NOTE - PROGRESS NOTE DETAILS
incidental finding of egfr change 80 to 55 from visit on incidental finding of egfr change 80 to 55 from visit on 11/10  consulted w/ nephrology- they stated that this is likely d/t the contrast that received on11/10   Nephrology states they will not come see them in the ED , states that there is a downtrend in values  Pt has fever upon VS recheck - ibuprofen and Tylenol ordered  she is resting comfortably- will reassess. OBGYN saw pt at bedside when I was going to pelvic exam   OBGYN states no concern for PID CT A/P w/ IV contrast  Partially loculated perisplenic fluid tracking along the left paracolic   gutter.    Multiple heterogeneous uterine masses likely representing fibroids.   Questionable masslike hypoattenuation of the cervical region, which may   represent normal appearance of the cervix, however an underlying mass   cannot be excluded. Recommend further evaluation with pelvic ultrasound   or nonemergent contrast-enhanced pelvic MRI.    Nonspecific soft tissue nodules or lymph nodes in the right lower   quadrant and at the level of the aortic bifurcation.      OBGYN consulted- they will see pt at bedside   Informed  of pt- pt likely will admitted for FUO OBGYN saw pt at bedside when I was going to do  pelvic exam   OBGYN states no concern for PID KIA BUENO:  Pt accepted for admission.  Hospitalist requesting ns 100cc/hr for 1 L.  Will order.

## 2024-11-15 NOTE — ED PROVIDER NOTE - ATTENDING APP SHARED VISIT CONTRIBUTION OF CARE
Brief HPI:  29-year-old female no significant past medical history, previous hospitalization for fever of unknown origin, presents for fever, chills, lower abdominal pain for 2 weeks.  Patient seen in outside ED 11/5/2024, treated with antibiotics for UTI.  Presented to [facility] 11/10/2024 for similar symptoms and had negative CT PE study for elevated D-dimer.  Urine culture negative from that visit.  Patient presents today for persistent symptoms.    Vitals:   Reviewed    Exam:    GEN:  Non-toxic appearing, non-distressed, speaking full sentences, non-diaphoretic, AAOx3  HEENT:  NCAT, neck supple, EOMI, PERRLA, sclera anicteric, no conjunctival pallor or injection, no stridor, normal voice, no tonsillar exudate, uvula midline  CV:  regular rhythm and rate, s1/s2 audible, no murmurs, rubs or gallops, peripheral pulses 2+ and symmetric  PULM:  non-labored respirations, lungs clear to auscultation bilaterally, no wheezes, crackles or rales  ABD:  non distended, non-tender, no rebound, no guarding, negative Sebastian's sign, bowel sounds normal, no cvat  MSK:  no gross deformity, non-tender extremities and joints, range of motion grossly normal appearing, no extremity edema, extremities warm and well perfused   NEURO:  AAOx3, CN II-XII intact, motor 5/5 in upper and lower extremities bilaterally, sensation grossly intact in extremities and trunk, finger to nose testing wnl, no nystagmus, negative Romberg, no pronator drift, no gait deficit  SKIN:  warm, dry, no rash or vesicles     A/P:   29-year-old female no significant past medical history, previous hospitalization for fever of unknown origin, presents for fever, chills, lower abdominal pain for 2 weeks.  Febrile on arrival.  Reports lower abdominal pain but abdomen nontender.  Will obtain labs, CT abdomen pelvis, urine studies, blood cultures.  Disposition pending.

## 2024-11-15 NOTE — ED PROVIDER NOTE - CARDIAC, MLM
[facility] called and asked for update on case, daytime office doesn't open until 0900. Per [place] from [facility] she doesn't have any information on the patient at this time and states she will try and get in contact with someone who was working yesterday, notified that per ER note Colton Paula was the person noted that was spoken to by /patient from [address] that patient was instructed by [facility] to go to the ER and is awaiting inpatient placement, [place] states that is an unusual but she will inquire about patients case and call back.             Lizandronadine Arias [address] [place] [address]  04/29/22 8336 Normal rate, regular rhythm.  Heart sounds S1, S2.  No murmurs, rubs or gallops.

## 2024-11-15 NOTE — CONSULT NOTE ADULT - ASSESSMENT
A/P: 30 y/o G0 LMP 10/20 presenting w/ complaints of fevers/chills, body aches, and abdominal pain for 2 weeks. Patient febrile to T38.3 in the ED with mild tachycardia to 100s. Serum studies with no leukocytosis, elevated lipase. Physical exam with no abdominal tenderness and no CMT. Clinical status reassuring.     - Would recommend TVUS to further evaluate for poss TOA, although no structures visualized on CT A/P  - Low clinical suspicion for PID at this time given no CMT. However, given that patient is febrile with lower abdominal pain and no other source of fever has been determined, would recommend treatment with Ceftriaxone 250 mg IM x1, Doxycycline 100 mg BID x 14 days +/-  Flagyl 500 mg BID x 14 days  - would recommend sending urine GC/CT   - Remainder of workup per primary team.     D/w Dr. Thiago Page PGY2 A/P: 30 y/o G0 LMP 10/20 presenting w/ complaints of fevers/chills, body aches, and abdominal pain for 2 weeks. Patient febrile to T38.3 in the ED with mild tachycardia to 100s. Serum studies with no leukocytosis, elevated lipase to 133 and SILVESTRE with Cr of 1.33. Physical exam with no abdominal tenderness and no CMT. Clinical status reassuring.     - Would recommend TVUS to further evaluate for poss TOA, although no structures visualized on CT A/P  - Low clinical suspicion for PID at this time given no CMT. However, given that patient is febrile with lower abdominal pain and no other source of fever has been determined, would recommend treatment with Ceftriaxone 250 mg IM x1, Doxycycline 100 mg BID x 14 days +/-  Flagyl 500 mg BID x 14 days if all other workup negative.   - would recommend sending urine GC/CT   - Remainder of workup per primary team.     D/w Dr. Thiago Page PGY2

## 2024-11-15 NOTE — ED PROVIDER NOTE - OBJECTIVE STATEMENT
30 y/o female reports today c/o lower abdominal pain 30 y/o female  w/ no pmh reports today c/o lower abdominal pain. subjective fever and chills X apprx 2 weeks. Pt seen in [facility] ED on 11/5; was told she has a UTI, started on cefpodixime but pt is unsure if urine cx was sent. Pt seen at [facility] on 11/10/24 for similar sx- had a elevated D Dimer, negative CTPE but ct a/p showed small amount of perisplenic fluid. There was a concern for a possibly nephrology issue on 11/10/24 d/t elevated protein >1000 in urine but was told to f/u outpatient w/ nephrology/gi. Pt states she has a nephrology appointment on 11/19/24 but did not schedule the GI appointment as yet. She states her sx have not worsened but she is concerned d/t the time period of her sx. Pain is currently 3/10 w/ no radiation. Denies; cp, sob, recent injury, dizziness, syncopal episode, pelvic pain, vaginal dc, lbp, bowel/bladder dysfunction, saddle parasthesia, urinary retention, nausea or vomiting. 30 y/o female  w/ no pmh reports today c/o lower abdominal pain. subjective fever and chills X apprx 2 weeks. Pt seen in [facility] ED on 11/5; was told she has a UTI, started on cefpodixime but pt is unsure if urine cx was sent. Pt seen at [facility] on 11/10/24 for similar sx- had a elevated D Dimer, negative CTPE but ct a/p showed small amount of perisplenic fluid. There was a concern for a possibly nephrology issue on 11/10/24 d/t elevated protein >1000 in urine but was told to f/u outpatient w/ nephrology. Pt states she has a nephrology appointment on 11/19/24 but did not schedule the GI appointment as yet. She states her sx have not worsened but she is concerned d/t the time period of her sx. Pain is currently 3/10 w/ no radiation. Denies; cp, sob, ha, neck pain, recent injury, dizziness, syncopal episode, pelvic pain, vaginal dc, lbp, bowel/bladder dysfunction, saddle paresthesia, urinary retention, nausea or vomiting.

## 2024-11-15 NOTE — CONSULT NOTE ADULT - SUBJECTIVE AND OBJECTIVE BOX
*INCOMPLETE NOTE*    ARGENIS STEPHENSON  29y  Female [id number]    HPI:        Name of GYN Physician:   OBHx:    GynHx: Denies fibroids, cysts, endometriosis, STI's, Abnormal pap smears   PMH:  PSH:  Meds:  Allx:  Social History:  Denies smoking use, drug use, alcohol use.   +occasional social alcohol use    Vital Signs Last 24 Hrs  T(C): 38.3 (15 Nov 2024 18:54), Max: 38.3 (15 Nov 2024 18:54)  T(F): 100.9 (15 Nov 2024 18:54), Max: 100.9 (15 Nov 2024 18:54)  HR: 106 (15 Nov 2024 18:54) (101 - 106)  BP: 128/84 (15 Nov 2024 18:54) (126/78 - 128/84)  BP(mean): --  RR: 16 (15 Nov 2024 18:54) (16 - 16)  SpO2: 100% (15 Nov 2024 18:54) (100% - 100%)    Parameters below as of 15 Nov 2024 18:54  Patient On (Oxygen Delivery Method): room air        Physical Exam:   General: sitting comfortably in bed, NAD   HEENT: neck supple, full ROM  CV: RRR  Lungs: CTA b/l, good air flow b/l   Back: No CVA tenderness  Abd: Soft, non-tender, non-distended.  Bowel sounds present.    :  No bleeding on pad.    External labia wnl.  Bimanual exam with no cervical motion tenderness, uterus wnl, adnexa non palpable b/l.  Cervix closed vs. Cervix dilated  Speculum Exam: No active bleeding from os.  Physiologic discharge.    Ext: non-tender b/l, no edema     LABS:      Pregnancy Profile, Urine: Negative (11-15- @ 15:30)                          9.5    5.38  )-----------( 207      ( 15 Nov 2024 15:30 )             29.8     11-15    138  |  101  |  10  ----------------------------<  84  3.7   |  25  |  1.33[H]    Ca    8.7      15 Nov 2024 15:30    TPro  8.0  /  Alb  3.4  /  TBili  0.5  /  DBili  x   /  AST  40[H]  /  ALT  17  /  AlkPhos  58  11-15    I&O's Detail      Urinalysis Basic - ( 15 Nov 2024 15:30 )    Color: Yellow / Appearance: Turbid / S.020 / pH: x  Gluc: 84 mg/dL / Ketone: Trace mg/dL  / Bili: Negative / Urobili: 1.0 mg/dL   Blood: x / Protein: 300 mg/dL / Nitrite: Negative   Leuk Esterase: Trace / RBC: 126 /HPF / WBC 15 /HPF   Sq Epi: x / Non Sq Epi: 14 /HPF / Bacteria: Occasional /HPF        RADIOLOGY & ADDITIONAL STUDIES:     ARGENIS STEPHENSON  29y  Female [id number]    HPI:  30 y/o G0 LMP 10/20 presenting with complaints of fevers/chills, body aches, and abdominal pain for 2 weeks. Patient was seen in the ED on  where she was told she had a UTI and d/c'd home on cefpodoxime. Patient's symptoms did not improve on antibiotics so she presented to the ED on 11/10 where UCx was sent and found to be negative. Patient had elevated D-dimer and was ruled out for PE. Patient was recommended outpatient Nephrology follow up (due to proteinuria). Patient continued to have symptoms to present to ED again. She states she has lower abdominal pain that is midline/suprapubic. She said it worsens with bowel movements. The patient is also endorsing nausea but no episodes of vomiting. She denies CP/SOB. She denies lightheadedness or dizziness. She denies vaginal discharge, bleeding or itchiness. She denies dysuria.       Name of GYN Physician: [facility]  OBHx:  G0  GynHx: h/o HPV+ pap smear, repeat pap wnl; Patient has regular periods ~28 days, last 7 days with moderate bleeding. Denies fibroids, cysts, endometriosis, STI's  PMH: GERD, HTN  PSH: denies  Meds: Metoprolol, Zofran PRN  Allx: NKDA  Social History:  Denies smoking use, drug use, alcohol use.     Vital Signs Last 24 Hrs  T(C): 38.3 (15 Nov 2024 18:54), Max: 38.3 (15 Nov 2024 18:54)  T(F): 100.9 (15 Nov 2024 18:54), Max: 100.9 (15 Nov 2024 18:54)  HR: 106 (15 Nov 2024 18:54) (101 - 106)  BP: 128/84 (15 Nov 2024 18:54) (126/78 - 128/84)  BP(mean): --  RR: 16 (15 Nov 2024 18:54) (16 - 16)  SpO2: 100% (15 Nov 2024 18:54) (100% - 100%)    Parameters below as of 15 Nov 2024 18:54  Patient On (Oxygen Delivery Method): room air        Physical Exam:   General: sitting comfortably in bed, NAD   HEENT: neck supple, full ROM  CV: well perfused  Lungs: nonlabored respirations  Back: No CVA tenderness  Abd: Soft, non-tender, non-distended  :  No bleeding on pad.    External labia wnl.  Bimanual exam with no cervical motion tenderness, uterus wnl, adnexa non palpable b/l.    Speculum Exam: No active bleeding from os.  Physiologic discharge.    Ext: non-tender b/l, no edema     LABS:      Pregnancy Profile, Urine: Negative (11-15-24 @ 15:30)                          9.5    5.38  )-----------( 207      ( 15 Nov 2024 15:30 )             29.8     11-15    138  |  101  |  10  ----------------------------<  84  3.7   |  25  |  1.33[H]    Ca    8.7      15 Nov 2024 15:30    TPro  8.0  /  Alb  3.4  /  TBili  0.5  /  DBili  x   /  AST  40[H]  /  ALT  17  /  AlkPhos  58  11-15    I&O's Detail      Urinalysis Basic - ( 15 Nov 2024 15:30 )    Color: Yellow / Appearance: Turbid / S.020 / pH: x  Gluc: 84 mg/dL / Ketone: Trace mg/dL  / Bili: Negative / Urobili: 1.0 mg/dL   Blood: x / Protein: 300 mg/dL / Nitrite: Negative   Leuk Esterase: Trace / RBC: 126 /HPF / WBC 15 /HPF   Sq Epi: x / Non Sq Epi: 14 /HPF / Bacteria: Occasional /HPF        RADIOLOGY & ADDITIONAL STUDIES:    ACC: [id number] EXAM:  CT ABDOMEN AND PELVIS IC   ORDERED BY: ABDIAS DAVIS     PROCEDURE DATE:  11/15/2024          INTERPRETATION:  CLINICAL INFORMATION: Abdominal pain, Fever and chills   for 2 weeks.    COMPARISON: Abdominal radiograph 10/27/2021. Abdominal ultrasound   3/27/2011. CT chest 11/10/2024.    CONTRAST/COMPLICATIONS:  IV Contrast: Omnipaque 350  90 cc administered   10 cc discarded  Oral Contrast: NONE      PROCEDURE:  CT of the Abdomen and Pelvis was performed.  Sagittal and coronal reformats were performed.    FINDINGS:  LOWER CHEST: Within normal limits.    LIVER: Within normal limits.  BILE DUCTS: Normal caliber.  GALLBLADDER: Within normal limits.  SPLEEN: Partially loculated perisplenic fluid tracking along the left   paracolic gutter.  PANCREAS: Within normal limits.  ADRENALS: Within normal limits.  KIDNEYS/URETERS: Within normal limits.    BLADDER: Within normal limits.  REPRODUCTIVE ORGANS: Multiple heterogeneous uterine masses likely   representing fibroids. Questionable masslike hypoattenuation of the   cervical region, which may represent normal appearance of the cervix   versus an underlying mass.    BOWEL: No bowel obstruction. Appendix is normal.  PERITONEUM/RETROPERITONEUM: Small volume perisplenic and pelvic ascites.  VESSELS: Within normal limits.  LYMPH NODES: Right lower quadrant, anterior to the psoas muscle (301   -60), 1.7 x 1.3 cm nodule or lymph node. Additional nodule/node at the   level of the aortic bifurcation (301-59), 1.7 x 1 cm.  ABDOMINAL WALL: Tiny fat-containing right inguinal hernia.  BONES: Within normal limits.    IMPRESSION:  Partially loculated perisplenic fluid tracking along the left paracolic   gutter.    Multiple heterogeneous uterine masses likely representing fibroids.   Questionable masslike hypoattenuation of the cervical region, which may   represent normal appearance of the cervix, however an underlying mass   cannot be excluded. Recommend further evaluation with pelvic ultrasound   or nonemergent contrast-enhanced pelvic MRI.    Nonspecific soft tissue nodules or lymph nodes in the right lower   quadrant and at the level of the aortic bifurcation.        --- End of Report ---          ERNST TINSLEY MD; Resident Radiologist  This document has been electronically signed.  BA ENGLE MD; Attending Radiologist  This document has been electronically signed. Nov 15 2024  7:12PM

## 2024-11-15 NOTE — ED ADULT NURSE REASSESSMENT NOTE - NS ED NURSE REASSESS COMMENT FT1
Patient awake and resting in stretcher; respirations even and unlabored, no signs/symptoms of acute distress. Patient denies dyspnea, shortness of breath, and chest pain. Patient denies pain and offers no complaints at this time. Safety measures in place, report given to ANTONY Devine. Steady gait observed, patient pending transport to [address].
Report received from ANTONY Valle. Patient A&Ox3 and ambulatory at baseline. Respirations even and unlabored, no signs/symptoms of acute distress. Patient denies pain and offers no complaints at this time. Patient in stable condition and transport present to bring patient to ultrasound. Safety measures in place, steady gait observed.
break coverage rn. received report from ANTONY colón. rpt vitals performed as patient felt feverish. oral temp noted to be 100.9 PA made aware. pt provided IV tylenol and PO motrin. pending CT results

## 2024-11-16 DIAGNOSIS — R59.1 GENERALIZED ENLARGED LYMPH NODES: ICD-10-CM

## 2024-11-16 DIAGNOSIS — N39.0 URINARY TRACT INFECTION, SITE NOT SPECIFIED: ICD-10-CM

## 2024-11-16 DIAGNOSIS — R50.9 FEVER, UNSPECIFIED: ICD-10-CM

## 2024-11-16 DIAGNOSIS — R80.9 PROTEINURIA, UNSPECIFIED: ICD-10-CM

## 2024-11-16 DIAGNOSIS — Z29.9 ENCOUNTER FOR PROPHYLACTIC MEASURES, UNSPECIFIED: ICD-10-CM

## 2024-11-16 DIAGNOSIS — I10 ESSENTIAL (PRIMARY) HYPERTENSION: ICD-10-CM

## 2024-11-16 DIAGNOSIS — R10.31 RIGHT LOWER QUADRANT PAIN: ICD-10-CM

## 2024-11-16 DIAGNOSIS — R10.9 UNSPECIFIED ABDOMINAL PAIN: ICD-10-CM

## 2024-11-16 LAB
ALBUMIN SERPL ELPH-MCNC: 2.9 G/DL — LOW (ref 3.3–5)
ALP SERPL-CCNC: 54 U/L — SIGNIFICANT CHANGE UP (ref 40–120)
ALT FLD-CCNC: 14 U/L — SIGNIFICANT CHANGE UP (ref 4–33)
ANION GAP SERPL CALC-SCNC: 11 MMOL/L — SIGNIFICANT CHANGE UP (ref 7–14)
AST SERPL-CCNC: 31 U/L — SIGNIFICANT CHANGE UP (ref 4–32)
B PERT DNA SPEC QL NAA+PROBE: SIGNIFICANT CHANGE UP
B PERT+PARAPERT DNA PNL SPEC NAA+PROBE: SIGNIFICANT CHANGE UP
BASOPHILS # BLD AUTO: 0.02 K/UL — SIGNIFICANT CHANGE UP (ref 0–0.2)
BASOPHILS NFR BLD AUTO: 0.5 % — SIGNIFICANT CHANGE UP (ref 0–2)
BILIRUB SERPL-MCNC: 0.4 MG/DL — SIGNIFICANT CHANGE UP (ref 0.2–1.2)
BUN SERPL-MCNC: 11 MG/DL — SIGNIFICANT CHANGE UP (ref 7–23)
C PNEUM DNA SPEC QL NAA+PROBE: SIGNIFICANT CHANGE UP
CALCIUM SERPL-MCNC: 8.2 MG/DL — LOW (ref 8.4–10.5)
CHLORIDE SERPL-SCNC: 106 MMOL/L — SIGNIFICANT CHANGE UP (ref 98–107)
CO2 SERPL-SCNC: 22 MMOL/L — SIGNIFICANT CHANGE UP (ref 22–31)
CREAT ?TM UR-MCNC: 124 MG/DL — SIGNIFICANT CHANGE UP
CREAT SERPL-MCNC: 1.21 MG/DL — SIGNIFICANT CHANGE UP (ref 0.5–1.3)
CULTURE RESULTS: SIGNIFICANT CHANGE UP
EGFR: 61 ML/MIN/1.73M2 — SIGNIFICANT CHANGE UP
EOSINOPHIL # BLD AUTO: 0.06 K/UL — SIGNIFICANT CHANGE UP (ref 0–0.5)
EOSINOPHIL NFR BLD AUTO: 1.4 % — SIGNIFICANT CHANGE UP (ref 0–6)
FLUAV SUBTYP SPEC NAA+PROBE: SIGNIFICANT CHANGE UP
FLUBV RNA SPEC QL NAA+PROBE: SIGNIFICANT CHANGE UP
GLUCOSE SERPL-MCNC: 99 MG/DL — SIGNIFICANT CHANGE UP (ref 70–99)
HADV DNA SPEC QL NAA+PROBE: SIGNIFICANT CHANGE UP
HCOV 229E RNA SPEC QL NAA+PROBE: SIGNIFICANT CHANGE UP
HCOV HKU1 RNA SPEC QL NAA+PROBE: SIGNIFICANT CHANGE UP
HCOV NL63 RNA SPEC QL NAA+PROBE: SIGNIFICANT CHANGE UP
HCOV OC43 RNA SPEC QL NAA+PROBE: SIGNIFICANT CHANGE UP
HCT VFR BLD CALC: 28.1 % — LOW (ref 34.5–45)
HGB BLD-MCNC: 9 G/DL — LOW (ref 11.5–15.5)
HIV 1+2 AB+HIV1 P24 AG SERPL QL IA: SIGNIFICANT CHANGE UP
HMPV RNA SPEC QL NAA+PROBE: SIGNIFICANT CHANGE UP
HPIV1 RNA SPEC QL NAA+PROBE: SIGNIFICANT CHANGE UP
HPIV2 RNA SPEC QL NAA+PROBE: SIGNIFICANT CHANGE UP
HPIV3 RNA SPEC QL NAA+PROBE: SIGNIFICANT CHANGE UP
HPIV4 RNA SPEC QL NAA+PROBE: SIGNIFICANT CHANGE UP
IANC: 2.57 K/UL — SIGNIFICANT CHANGE UP (ref 1.8–7.4)
IMM GRANULOCYTES NFR BLD AUTO: 0.5 % — SIGNIFICANT CHANGE UP (ref 0–0.9)
LYMPHOCYTES # BLD AUTO: 1.36 K/UL — SIGNIFICANT CHANGE UP (ref 1–3.3)
LYMPHOCYTES # BLD AUTO: 31.1 % — SIGNIFICANT CHANGE UP (ref 13–44)
M PNEUMO DNA SPEC QL NAA+PROBE: SIGNIFICANT CHANGE UP
MAGNESIUM SERPL-MCNC: 1.8 MG/DL — SIGNIFICANT CHANGE UP (ref 1.6–2.6)
MCHC RBC-ENTMCNC: 31.6 PG — SIGNIFICANT CHANGE UP (ref 27–34)
MCHC RBC-ENTMCNC: 32 G/DL — SIGNIFICANT CHANGE UP (ref 32–36)
MCV RBC AUTO: 98.6 FL — SIGNIFICANT CHANGE UP (ref 80–100)
MONOCYTES # BLD AUTO: 0.35 K/UL — SIGNIFICANT CHANGE UP (ref 0–0.9)
MONOCYTES NFR BLD AUTO: 8 % — SIGNIFICANT CHANGE UP (ref 2–14)
NEUTROPHILS # BLD AUTO: 2.57 K/UL — SIGNIFICANT CHANGE UP (ref 1.8–7.4)
NEUTROPHILS NFR BLD AUTO: 58.5 % — SIGNIFICANT CHANGE UP (ref 43–77)
NRBC # BLD: 0 /100 WBCS — SIGNIFICANT CHANGE UP (ref 0–0)
NRBC # FLD: 0 K/UL — SIGNIFICANT CHANGE UP (ref 0–0)
PHOSPHATE SERPL-MCNC: 3.9 MG/DL — SIGNIFICANT CHANGE UP (ref 2.5–4.5)
PLATELET # BLD AUTO: 193 K/UL — SIGNIFICANT CHANGE UP (ref 150–400)
POTASSIUM SERPL-MCNC: 3.8 MMOL/L — SIGNIFICANT CHANGE UP (ref 3.5–5.3)
POTASSIUM SERPL-SCNC: 3.8 MMOL/L — SIGNIFICANT CHANGE UP (ref 3.5–5.3)
PROT ?TM UR-MCNC: 252 MG/DL — SIGNIFICANT CHANGE UP
PROT SERPL-MCNC: 6.9 G/DL — SIGNIFICANT CHANGE UP (ref 6–8.3)
PROT/CREAT UR-RTO: 2 RATIO — HIGH (ref 0–0.2)
RAPID RVP RESULT: SIGNIFICANT CHANGE UP
RBC # BLD: 2.85 M/UL — LOW (ref 3.8–5.2)
RBC # FLD: 13.1 % — SIGNIFICANT CHANGE UP (ref 10.3–14.5)
RSV RNA SPEC QL NAA+PROBE: SIGNIFICANT CHANGE UP
RV+EV RNA SPEC QL NAA+PROBE: SIGNIFICANT CHANGE UP
SARS-COV-2 RNA SPEC QL NAA+PROBE: SIGNIFICANT CHANGE UP
SODIUM SERPL-SCNC: 139 MMOL/L — SIGNIFICANT CHANGE UP (ref 135–145)
SPECIMEN SOURCE: SIGNIFICANT CHANGE UP
WBC # BLD: 4.38 K/UL — SIGNIFICANT CHANGE UP (ref 3.8–10.5)
WBC # FLD AUTO: 4.38 K/UL — SIGNIFICANT CHANGE UP (ref 3.8–10.5)

## 2024-11-16 PROCEDURE — 99222 1ST HOSP IP/OBS MODERATE 55: CPT

## 2024-11-16 PROCEDURE — 99232 SBSQ HOSP IP/OBS MODERATE 35: CPT

## 2024-11-16 RX ORDER — METOPROLOL TARTRATE 100 MG/1
100 TABLET, FILM COATED ORAL DAILY
Refills: 0 | Status: DISCONTINUED | OUTPATIENT
Start: 2024-11-16 | End: 2024-11-16

## 2024-11-16 RX ORDER — DOXYCYCLINE HYCLATE 150 MG/1
100 TABLET, COATED ORAL EVERY 12 HOURS
Refills: 0 | Status: DISCONTINUED | OUTPATIENT
Start: 2024-11-16 | End: 2024-11-22

## 2024-11-16 RX ORDER — METRONIDAZOLE 500 MG/1
TABLET ORAL
Refills: 0 | Status: DISCONTINUED | OUTPATIENT
Start: 2024-11-16 | End: 2024-11-22

## 2024-11-16 RX ORDER — METOPROLOL TARTRATE 100 MG/1
100 TABLET, FILM COATED ORAL DAILY
Refills: 0 | Status: DISCONTINUED | OUTPATIENT
Start: 2024-11-16 | End: 2024-11-22

## 2024-11-16 RX ORDER — ACETAMINOPHEN 500MG 500 MG/1
1000 TABLET, COATED ORAL ONCE
Refills: 0 | Status: COMPLETED | OUTPATIENT
Start: 2024-11-16 | End: 2024-11-16

## 2024-11-16 RX ORDER — CEFTRIAXONE SODIUM 1 G
1000 VIAL (EA) INJECTION EVERY 24 HOURS
Refills: 0 | Status: DISCONTINUED | OUTPATIENT
Start: 2024-11-16 | End: 2024-11-16

## 2024-11-16 RX ORDER — CEFTRIAXONE SODIUM 1 G
1000 VIAL (EA) INJECTION EVERY 24 HOURS
Refills: 0 | Status: DISCONTINUED | OUTPATIENT
Start: 2024-11-16 | End: 2024-11-21

## 2024-11-16 RX ORDER — METOPROLOL TARTRATE 100 MG/1
1 TABLET, FILM COATED ORAL
Refills: 0 | DISCHARGE

## 2024-11-16 RX ORDER — METRONIDAZOLE 500 MG/1
500 TABLET ORAL EVERY 12 HOURS
Refills: 0 | Status: DISCONTINUED | OUTPATIENT
Start: 2024-11-16 | End: 2024-11-22

## 2024-11-16 RX ORDER — METRONIDAZOLE 500 MG/1
500 TABLET ORAL ONCE
Refills: 0 | Status: COMPLETED | OUTPATIENT
Start: 2024-11-16 | End: 2024-11-16

## 2024-11-16 RX ORDER — ENOXAPARIN SODIUM 30 MG/.3ML
40 INJECTION SUBCUTANEOUS EVERY 24 HOURS
Refills: 0 | Status: DISCONTINUED | OUTPATIENT
Start: 2024-11-16 | End: 2024-11-22

## 2024-11-16 RX ADMIN — ACETAMINOPHEN 500MG 650 MILLIGRAM(S): 500 TABLET, COATED ORAL at 19:17

## 2024-11-16 RX ADMIN — DOXYCYCLINE HYCLATE 100 MILLIGRAM(S): 150 TABLET, COATED ORAL at 06:58

## 2024-11-16 RX ADMIN — METRONIDAZOLE 100 MILLIGRAM(S): 500 TABLET ORAL at 18:44

## 2024-11-16 RX ADMIN — ENOXAPARIN SODIUM 40 MILLIGRAM(S): 30 INJECTION SUBCUTANEOUS at 21:19

## 2024-11-16 RX ADMIN — ACETAMINOPHEN 500MG 650 MILLIGRAM(S): 500 TABLET, COATED ORAL at 20:35

## 2024-11-16 RX ADMIN — ACETAMINOPHEN 500MG 400 MILLIGRAM(S): 500 TABLET, COATED ORAL at 11:07

## 2024-11-16 RX ADMIN — DOXYCYCLINE HYCLATE 100 MILLIGRAM(S): 150 TABLET, COATED ORAL at 18:48

## 2024-11-16 RX ADMIN — METOPROLOL TARTRATE 100 MILLIGRAM(S): 100 TABLET, FILM COATED ORAL at 06:09

## 2024-11-16 RX ADMIN — ACETAMINOPHEN 500MG 1000 MILLIGRAM(S): 500 TABLET, COATED ORAL at 11:37

## 2024-11-16 RX ADMIN — Medication 100 MILLIGRAM(S): at 06:07

## 2024-11-16 RX ADMIN — METRONIDAZOLE 100 MILLIGRAM(S): 500 TABLET ORAL at 14:00

## 2024-11-16 NOTE — CONSULT NOTE ADULT - SUBJECTIVE AND OBJECTIVE BOX
Patient is a 29y old  Female who presents with a chief complaint of Abdominal Pain, Fever (16 Nov 2024 07:52)    HPI:  29 y.o. female with PHM HTN presenting with lower abdominal pain, fever and chills for the past 2 weeks. Patient states she was seen in [facility] ED on 11/5; was told she has a UTI, started on cefpodixime but pt is unsure if urine cx was sent. Patient was then seen at [facility] on 11/10/24 for similar sx- found to have elevated D Dimer, negative CTPE but ct a/p showed small amount of perisplenic fluid. Patient was recommended to f/u with GI outpatient. There was also concern for a possibly nephrology issue at that time due to protein >1000 in urine, patient was told to f/u outpatient with nephrology. Pt states she has a nephrology appointment on 11/19/24 but did not schedule the GI appointment as yet.     Today patient describes her abdominal pain as dull, cramps, waxing/waning, 3-4 /10 in severity, localized in lower abdomen b/l. No radiation. Patient states eating makes the pain worse. Pain is slightly alleviated with heating pad and tylenol. She states her pain has not worsened but she is concerned because her pain is still persistent for 2 weeks and has not resolved after finishing course of antibiotics. Patient endorses nausea and decr. po intake but denies vomiting, diarrhea, hematochezia, melena. Denies dysuria, flank pain, vaginal bleeding, vaginal discharge. Patient also endorses fevers (Tmax 101 F, 2 days ago), chills, generalized weakness. Patient states she is sexually active, states she does not regularly use protection. Denies hx. STI. Additionally patient states her BP has been elevated, at home it was 151/103. She restarted her metoprolol 100mg daily yesterday after seeing PCP, prior she had not been on any BP medication for months. Endorses SOB. Denies CP, syncopal episodes, lightheadedness.     (16 Nov 2024 00:46)       prior hospital charts reviewed [  ]  primary team notes reviewed [  ]  other consultant notes reviewed [  ]    PAST MEDICAL & SURGICAL HISTORY:  HTN (hypertension)      No significant past surgical history          Allergies  No Known Allergies    ANTIMICROBIALS (past 90 days)  MEDICATIONS  (STANDING):    cefTRIAXone   IVPB   100 mL/Hr IV Intermittent (11-16-24 @ 06:07)    doxycycline IVPB   100 mL/Hr IV Intermittent (11-16-24 @ 06:58)        cefTRIAXone   IVPB 1000 every 24 hours  doxycycline IVPB 100 every 12 hours  metroNIDAZOLE  IVPB 500 once  metroNIDAZOLE  IVPB 500 every 12 hours  metroNIDAZOLE  IVPB      MEDICATIONS  (STANDING):  acetaminophen     Tablet .. 650 every 6 hours PRN  aluminum hydroxide/magnesium hydroxide/simethicone Suspension 30 every 4 hours PRN  influenza   Vaccine 0.5 once  melatonin 3 at bedtime PRN  metoprolol succinate  daily    SOCIAL HISTORY:       FAMILY HISTORY:  Family history of systemic lupus erythematosus (Sibling)      REVIEW OF SYSTEMS  [  ] ROS unobtainable because:    [  ] All other systems negative except as noted below:	    Constitutional:  [ ] fever [ ] chills  [ ] weight loss  [ ] weakness  Skin:  [ ] rash [ ] phlebitis	  Eyes: [ ] icterus [ ] pain  [ ] discharge	  ENMT: [ ] sore throat  [ ] thrush [ ] ulcers [ ] exudates  Respiratory: [ ] dyspnea [ ] hemoptysis [ ] cough [ ] sputum	  Cardiovascular:  [ ] chest pain [ ] palpitations [ ] edema	  Gastrointestinal:  [ ] nausea [ ] vomiting [ ] diarrhea [ ] constipation [ ] pain	  Genitourinary:  [ ] dysuria [ ] frequency [ ] hematuria [ ] discharge [ ] flank pain  [ ] incontinence  Musculoskeletal:  [ ] myalgias [ ] arthralgias [ ] arthritis  [ ] back pain  Neurological:  [ ] headache [ ] seizures  [ ] confusion/altered mental status  Psychiatric:  [ ] anxiety [ ] depression	  Hematology/Lymphatics:  [ ] lymphadenopathy  Endocrine:  [ ] adrenal [ ] thyroid  Allergic/Immunologic:	 [ ] transplant [ ] seasonal    Vital Signs Last 24 Hrs  T(F): 100.2 (11-16-24 @ 12:07), Max: 101.4 (11-16-24 @ 10:46)  Vital Signs Last 24 Hrs  HR: 109 (11-16-24 @ 10:46) (97 - 109)  BP: 146/98 (11-16-24 @ 10:46) (126/78 - 146/98)  RR: 17 (11-16-24 @ 10:46)  SpO2: 100% (11-16-24 @ 10:46) (98% - 100%)  Wt(kg): --    PHYSICAL EXAM:  Constitutional: non-toxic, no distress  HEAD/EYES: anicteric, no conjunctival injection  ENT:  supple, no thrush  Cardiovascular:   normal S1, S2, no murmur, no edema  Respiratory:  clear BS bilaterally, no wheezes, no rales  GI:  soft, non-tender, normal bowel sounds  :  no pearl, no CVA tenderness  Musculoskeletal:  no synovitis, normal ROM  Neurologic: awake and alert, normal strength, no focal findings  Skin:  no rash, no erythema, no phlebitis  Heme/Onc: no lymphadenopathy   Psychiatric:  awake, alert, appropriate mood                            9.0    4.38  )-----------( 193      ( 16 Nov 2024 07:23 )             28.1   11-16    139  |  106  |  11  ----------------------------<  99  3.8   |  22  |  1.21    Ca    8.2[L]      16 Nov 2024 07:23  Phos  3.9     11-16  Mg     1.80     11-16    TPro  6.9  /  Alb  2.9[L]  /  TBili  0.4  /  DBili  x   /  AST  31  /  ALT  14  /  AlkPhos  54  11-16    Urinalysis Basic - ( 16 Nov 2024 07:23 )    Color: x / Appearance: x / SG: x / pH: x  Gluc: 99 mg/dL / Ketone: x  / Bili: x / Urobili: x   Blood: x / Protein: x / Nitrite: x   Leuk Esterase: x / RBC: x / WBC x   Sq Epi: x / Non Sq Epi: x / Bacteria: x    MICROBIOLOGY:  Urinalysis with Rflx Culture (collected 15 Nov 2024 15:30)              Rapid RVP Result: NotDetec (11-15 @ 21:51)      RADIOLOGY:  imaging below personally reviewed and agree with findings    < from: CT Abdomen and Pelvis w/ IV Cont (11.15.24 @ 18:07) >    IMPRESSION:  Partially loculated perisplenic fluid tracking along the left paracolic   gutter.    Multiple heterogeneous uterine masses likely representing fibroids.   Questionable masslike hypoattenuation of the cervical region, which may   represent normal appearance of the cervix, however an underlying mass   cannot be excluded. Recommend further evaluation with pelvic ultrasound   or nonemergent contrast-enhanced pelvic MRI.    Nonspecific soft tissue nodules or lymph nodes in the right lower   quadrant and at the level of the aortic bifurcation.    < end of copied text >    < from: US Transvaginal (11.15.24 @ 21:29) >    IMPRESSION:  No sonographic evidence of ovarian torsion.    Fibroid uterus.    A 1.1 cm echogenic left ovarian lesion may represent a hemorrhagic cyst   or corpus albicans.    < end of copied text >   Patient is a 29y old  Female who presents with a chief complaint of Abdominal Pain, Fever (16 Nov 2024 07:52)    HPI:  29 y.o. female with PHM HTN presenting with lower abdominal pain, fever and chills for the past 2 weeks. Patient states she was seen in [facility] ED on 11/5; was told she has a UTI, started on cefpodixime but pt is unsure if urine cx was sent. Patient was then seen at [facility] on 11/10/24 for similar sx- found to have elevated D Dimer, negative CTPE but ct a/p showed small amount of perisplenic fluid. Patient was recommended to f/u with GI outpatient. There was also concern for a possibly nephrology issue at that time due to protein >1000 in urine, patient was told to f/u outpatient with nephrology. She continued to have abdominal pain and fever at home even on abx so she came to the ED. 11/15 CT abdomen reveals partially loculated perisplenic fluid. Multiple uterine masses likely representing fibroids. Nonspecific soft tissue nodules or lymph nodes in RLQ and level of aortic bifurfication. Pt started on IV abx       Pt states that now pain has improved     PAST MEDICAL & SURGICAL HISTORY:  HTN (hypertension)      No significant past surgical history          Allergies  No Known Allergies    ANTIMICROBIALS (past 90 days)  MEDICATIONS  (STANDING):    cefTRIAXone   IVPB   100 mL/Hr IV Intermittent (11-16-24 @ 06:07)    doxycycline IVPB   100 mL/Hr IV Intermittent (11-16-24 @ 06:58)        cefTRIAXone   IVPB 1000 every 24 hours  doxycycline IVPB 100 every 12 hours  metroNIDAZOLE  IVPB 500 once  metroNIDAZOLE  IVPB 500 every 12 hours  metroNIDAZOLE  IVPB      MEDICATIONS  (STANDING):  acetaminophen     Tablet .. 650 every 6 hours PRN  aluminum hydroxide/magnesium hydroxide/simethicone Suspension 30 every 4 hours PRN  influenza   Vaccine 0.5 once  melatonin 3 at bedtime PRN  metoprolol succinate  daily    SOCIAL HISTORY: Denies any smoking.     FAMILY HISTORY:  Family history of systemic lupus erythematosus (Sibling)    REVIEW OF SYSTEMS:    CONSTITUTIONAL: No weakness, fevers or chills  EYES:  No visual changes, no eye pain   ENT: N vertigo or throat pain   NECK: No pain or stiffness  RESPIRATORY: No cough, wheezing, hemoptysis; No shortness of breath  CARDIOVASCULAR: No chest pain or palpitations  GASTROINTESTINAL: No abdominal or epigastric pain. No nausea, vomiting, or hematemesis; No diarrhea or constipation. No melena or hematochezia.  GENITOURINARY: No dysuria, frequency or hematuria  NEUROLOGICAL: No numbness or weakness  SKIN: No itching, rashes  Psych: no anxiety or depression     Vital Signs Last 24 Hrs  T(F): 100.2 (11-16-24 @ 12:07), Max: 101.4 (11-16-24 @ 10:46)  Vital Signs Last 24 Hrs  HR: 109 (11-16-24 @ 10:46) (97 - 109)  BP: 146/98 (11-16-24 @ 10:46) (126/78 - 146/98)  RR: 17 (11-16-24 @ 10:46)  SpO2: 100% (11-16-24 @ 10:46) (98% - 100%)  Wt(kg): --    PHYSICAL EXAM:  Constitutional: non-toxic, no distress  HEAD/EYES: anicteric, no conjunctival injection  ENT:  supple, no thrush  Cardiovascular:   normal S1, S2, no murmur, no edema  Respiratory:  clear BS bilaterally, no wheezes, no rales  GI:  soft, non-tender, normal bowel sounds  :  no pearl, no CVA tenderness  Musculoskeletal:  no synovitis, normal ROM  Neurologic: awake and alert, normal strength, no focal findings  Skin:  no rash, no erythema, no phlebitis  Heme/Onc: no lymphadenopathy   Psychiatric:  awake, alert, appropriate mood                            9.0    4.38  )-----------( 193      ( 16 Nov 2024 07:23 )             28.1   11-16    139  |  106  |  11  ----------------------------<  99  3.8   |  22  |  1.21    Ca    8.2[L]      16 Nov 2024 07:23  Phos  3.9     11-16  Mg     1.80     11-16    TPro  6.9  /  Alb  2.9[L]  /  TBili  0.4  /  DBili  x   /  AST  31  /  ALT  14  /  AlkPhos  54  11-16    Urinalysis Basic - ( 16 Nov 2024 07:23 )    Color: x / Appearance: x / SG: x / pH: x  Gluc: 99 mg/dL / Ketone: x  / Bili: x / Urobili: x   Blood: x / Protein: x / Nitrite: x   Leuk Esterase: x / RBC: x / WBC x   Sq Epi: x / Non Sq Epi: x / Bacteria: x    MICROBIOLOGY:  Urinalysis with Rflx Culture (collected 15 Nov 2024 15:30)              Rapid RVP Result: NotDetec (11-15 @ 21:51)      RADIOLOGY:  imaging below personally reviewed and agree with findings    < from: CT Abdomen and Pelvis w/ IV Cont (11.15.24 @ 18:07) >    IMPRESSION:  Partially loculated perisplenic fluid tracking along the left paracolic   gutter.    Multiple heterogeneous uterine masses likely representing fibroids.   Questionable masslike hypoattenuation of the cervical region, which may   represent normal appearance of the cervix, however an underlying mass   cannot be excluded. Recommend further evaluation with pelvic ultrasound   or nonemergent contrast-enhanced pelvic MRI.    Nonspecific soft tissue nodules or lymph nodes in the right lower   quadrant and at the level of the aortic bifurcation.    < end of copied text >    < from: US Transvaginal (11.15.24 @ 21:29) >    IMPRESSION:  No sonographic evidence of ovarian torsion.    Fibroid uterus.    A 1.1 cm echogenic left ovarian lesion may represent a hemorrhagic cyst   or corpus albicans.    < end of copied text >

## 2024-11-16 NOTE — PROGRESS NOTE ADULT - PROBLEM SELECTOR PLAN 5
DVT PPx: SCDs   E: replete K<4, Mg<2  GI PPx: Not indicated  Diet: Regular, as tolerated  Code Status: Full  Dispo: pending medical improvement - c/w home metoprolol 100mg daily

## 2024-11-16 NOTE — H&P ADULT - NSHPSOCIALHISTORY_GEN_ALL_CORE
Tobacco use: Never smoker  EtOH use: Occasional  Illicit drug use: No  Living situation: Lives at home with mother and sisters  Work: Technician at [employer]

## 2024-11-16 NOTE — H&P ADULT - ATTENDING COMMENTS
Pt with HTN p/w lower abd/pelvic pain along with fever and chills.    On exam: Pt in NAD, heart RRR, lungs CTA B, abd s/nt/nd BS normal  Labs reviewed  CT reviewed showing possible cervix mass  TVUS reviewed showing no evidence of torsion, and possible hemorrhaging cyst    reviewed GYN consult    Will give empiric abx to cover for PID.    f/u culture  metoprolol

## 2024-11-16 NOTE — PROGRESS NOTE ADULT - ASSESSMENT
Progress Note    11-15-24 (1d)    Patient is a 29y old  Female who presents with a chief complaint of Abdominal Pain, Fever (2024 00:46)      Subjective / Overnight Events :  - No acute events overnight.  - Pt seen and examined at bedside.     Hospital Course:      Additional ROS (if any):    MEDICATIONS  (STANDING):  cefTRIAXone   IVPB 1000 milliGRAM(s) IV Intermittent every 24 hours  doxycycline IVPB 100 milliGRAM(s) IV Intermittent every 12 hours  influenza   Vaccine 0.5 milliLiter(s) IntraMuscular once  metoprolol succinate  milliGRAM(s) Oral daily  sodium chloride 0.9%. 1000 milliLiter(s) (100 mL/Hr) IV Continuous <Continuous>    MEDICATIONS  (PRN):  acetaminophen     Tablet .. 650 milliGRAM(s) Oral every 6 hours PRN Temp greater or equal to 38C (100.4F), Mild Pain (1 - 3)  aluminum hydroxide/magnesium hydroxide/simethicone Suspension 30 milliLiter(s) Oral every 4 hours PRN Dyspepsia  melatonin 3 milliGRAM(s) Oral at bedtime PRN Insomnia      CAPILLARY BLOOD GLUCOSE        I&O's Summary      PHYSICAL EXAM:  Vital Signs Last 24 Hrs  T(C): 36.9 (2024 05:10), Max: 38.3 (15 Nov 2024 18:54)  T(F): 98.4 (2024 05:10), Max: 100.9 (15 Nov 2024 18:54)  HR: 103 (2024 05:10) (97 - 106)  BP: 133/98 (2024 05:10) (126/78 - 134/90)  BP(mean): 96 (15 Nov 2024 22:03) (96 - 96)  RR: 17 (2024 05:10) (16 - 18)  SpO2: 100% (2024 05:10) (98% - 100%)    Parameters below as of 2024 05:10  Patient On (Oxygen Delivery Method): room air        General: NAD, non-toxic appearing   HEENT: PERRLA, EOMi, no scleral icterus  CV: RRR, normal S1 and S2, no m/r/g  Lungs: normal respiratory effort. CTAB, no wheezes, rales, or rhonchi  Abd: soft, nontender, nondistended  Ext: no edema, 2+ peripheral pulses   Pysch: AAOx3, appropriate affect   Neuro: grossly non-focal, moving all extremities spontaneously   Skin: no rashes or lesions     LABS:                          9.0    4.38  )-----------( 193      ( 2024 07:23 )             28.1       WBC Trend: 4.38<--, 5.38<--, 6.43<--  Hb Trend: 9.0<--, 9.5<--, 9.7<--    11-15    138  |  101  |  10  ----------------------------<  84  3.7   |  25  |  1.33[H]    Ca    8.7      15 Nov 2024 15:30    TPro  8.0  /  Alb  3.4  /  TBili  0.5  /  DBili  x   /  AST  40[H]  /  ALT  17  /  AlkPhos  58  11-15          Urinalysis Basic - ( 15 Nov 2024 15:30 )    Color: Yellow / Appearance: Turbid / S.020 / pH: x  Gluc: 84 mg/dL / Ketone: Trace mg/dL  / Bili: Negative / Urobili: 1.0 mg/dL   Blood: x / Protein: 300 mg/dL / Nitrite: Negative   Leuk Esterase: Trace / RBC: 126 /HPF / WBC 15 /HPF   Sq Epi: x / Non Sq Epi: 14 /HPF / Bacteria: Occasional /HPF        Urinalysis with Rflx Culture (collected 15 Nov 2024 15:30)          RADIOLOGY & ADDITIONAL TESTS: Reviewed 29F with PHM HTN presenting with lower abdominal pain, fever and chills for the past 2 weeks. patient received 7 days of Cefpodixime from [facility] from 11/5, represented to [facility] 11/10 for similar symptoms CT A/P          Partially loculated perisplenic fluid tracking along the left paracolic   gutter.    Multiple heterogeneous uterine masses likely representing fibroids.   Questionable masslike hypoattenuation of the cervical region, which may   represent normal appearance of the cervix, however an underlying mass   cannot be excluded. Recommend further evaluation with pelvic ultrasound   or nonemergent contrast-enhanced pelvic MRI.    Nonspecific soft tissue nodules or lymph nodes in the right lower   quadrant and at the level of the aortic bifurcation.   29F with PHM HTN presenting with lower abdominal pain, fever and chills for the past 2 weeks. evaluated by GYN at the ED, working ddx GYN vs  vs perisplenic ascites, Started on CTX/Doxy/Flagyl (11/15-).      Relevant hx:  - s/p Cefpodoxime from suspected UTI from [facility],   - represented to [facility] 11/10 for similar symptoms CT PE showed perisplenic ascites and b/l axillary lymphadenopathy

## 2024-11-16 NOTE — PROGRESS NOTE ADULT - PROBLEM SELECTOR PLAN 6
DVT PPx: SCDs   E: replete K<4, Mg<2  GI PPx: Not indicated  Diet: Regular, as tolerated  Code Status: Full  Dispo: pending medical improvement 11/10 proteinuria > 1000  11/15 proteinuria > 500  ddx transient proteinuria, orthostatic proteinuria vs GN     Plan:   - Pr/Cr ratio

## 2024-11-16 NOTE — H&P ADULT - HISTORY OF PRESENT ILLNESS
28 y/o female  w/ no pmh reports today c/o lower abdominal pain. subjective fever and chills X apprx 2 weeks. Pt seen in [facility] ED on 11/5; was told she has a UTI, started on cefpodixime but pt is unsure if urine cx was sent. Pt seen at [facility] on 11/10/24 for similar sx- had a elevated D Dimer, negative CTPE but ct a/p showed small amount of perisplenic fluid. There was a concern for a possibly nephrology issue on 11/10/24 d/t elevated protein >1000 in urine but was told to f/u outpatient w/ nephrology. Pt states she has a nephrology appointment on 11/19/24 but did not schedule the GI appointment as yet. She states her sx have not worsened but she is concerned d/t the time period of her sx. Pain is currently 3/10 w/ no radiation. Denies; cp, sob, ha, neck pain, recent injury, dizziness, syncopal episode, pelvic pain, vaginal dc, lbp, bowel/bladder dysfunction, saddle paresthesia, urinary retention, nausea or vomiting. 29 y.o. female with PHM HTN presenting with lower abdominal pain, fever and chills for the past 2 weeks. Patient states she was seen in [place] ED on 11/5; was told she has a UTI, started on cefpodixime but pt is unsure if urine cx was sent. Patient was then seen at [facility] on 11/10/24 for similar sx- found to have elevated D Dimer, negative CTPE but ct a/p showed small amount of perisplenic fluid. Patient was recommended to f/u with GI outpatient. There was also concern for a possibly nephrology issue at that time due to protein >1000 in urine, patient was told to f/u outpatient with nephrology. Pt states she has a nephrology appointment on 11/19/24 but did not schedule the GI appointment as yet.     Today patient describes her abdominal pain as dull, cramps, waxing/waning, 3-4 /10 in severity, localized in lower abdomen b/l. No radiation. Patient states eating makes the pain worse. Pain is slightly alleviated with heating pad and tylenol. She states her pain has not worsened but she is concerned because her pain is still persistent for 2 weeks and has not resolved after finishing course of antibiotics. Patient endorses nausea and decr. po intake but denies vomiting, diarrhea, hematochezia, melena. Denies dysuria, flank pain, vaginal bleeding, vaginal discharge. Patient also endorses fevers (Tmax 101 F, 2 days ago), chills, generalized weakness. Patient states she is sexually active, states she does not regularly use protection. Denies hx. STI. Additionally patient states her BP has been elevated, at home it was 151/103. She restarted her metoprolol 100mg daily yesterday after seeing PCP, prior she had not been on any BP medication for months. Endorses SOB. Denies CP, syncopal episodes, lightheadedness.

## 2024-11-16 NOTE — H&P ADULT - PROBLEM SELECTOR PLAN 2
- s/p cefpodoxime 200mg BID x 7 days treatment at home for UTI, last dose today  - Urine culture 11/10 showed normal urogenital fluora  - repeat UA this admission, likely contaminated.   -  f/u urine cx. from this admission

## 2024-11-16 NOTE — PROGRESS NOTE ADULT - PROBLEM SELECTOR PLAN 3
- c/w home metoprolol 100mg daily   - continue to monitor s/p cefpodoxime 200mg BID x 7 days treatment at home for UTI, last dose today  Urine culture 11/10 showed normal urogenital fluora  repeat UA this admission, likely contaminated.     Plan:  - abx as above  -  f/u urine cx. from this admission 11/15 CT A/P LYMPH NODES: Right lower quadrant, anterior to the psoas muscle (301 -60), 1.7 x 1.3 cm nodule or lymph node. Additional nodule/node at the level of the aortic bifurcation (301-59), 1.7 x 1 cm  11/10: Mild bilateral axillary lymphadenopathy.   cervical lymphadenopathy on examine  painless lymphadenopathy    Plan:  - f/u workup as above

## 2024-11-16 NOTE — CONSULT NOTE ADULT - ATTENDING COMMENTS
28 yo woman with fever and abdominal pain found on CT abd to have loculated, perisplenic collection   Had taken course of po antibiotic for UTI as outpatient without improvement   Possible GYN source  Now started doxy, flagyl, ceftriaxone   Cultures, serologies testing   Feels better-   Would continue present antibiotics

## 2024-11-16 NOTE — PROGRESS NOTE ADULT - ATTENDING COMMENTS
I have personally seen and examined patient on the above date. I discussed the case with and I agree with the findings and plan as detailed per note above, which I have amended where appropriate.    Ms. Garza is a 30 y/o F with PMH of HTN who presented to [facility] with ongoing worsening lower abd pain, fevers, chills for the past 2 weeks, previously seen in [facility] ED on 11/5/24, found with UTI, discharged on cefpodoxime, presented back to [facility] ED on 11/10 with similar sx and discharged from ED and now admitted due to persistent nature of the abdominal pain for further evaluation.     Pt seen and examined this morning, reporting still some abd pain, improving slightly from prior. Afebrile overnight, later in the morning noted to be febrile to 101.4. No hematuria, melena, hematochezia, no abd     #SIRS + on admission   - febrile to 100.9, Tachycardic to 100s  - vitals stable   - monitor fever curve    #Lower abd pain - ?PID vs ?ruptured ovarian cyst  #Fibroids   - CT AP notable for: loculated perisplenic fluid along with L paracolic gutter, multiple heterogenous uterine masses likely fibroids, nonspecific soft tissue nodules/lymph nodes in RLQ and at the level of aortic bifurcation    - TVUS on 11/15 -> negative for torsion, fibroid uterus, 1.1cm echogenic L ovarian torsion may represent a hemorrhagic cyst or corpus albicans   - GYN consulted in ED:  - UA on 11/10 notable for: cloudy, orange urine, trace LE, +bacteria, WBC 17, +blood, CFU <10K. RVP negative.   - started on CTX/Doxycycline overnight. Again febrile this morning, given recurrent fevers without clear source, will consult ID team for further management and adjustment of abx therapy  - F.u BCX   - may need op urine culture results for further investigation   - f/u GC chlyamdia, drug screen, syphilis screen   - monitor fever curve    #HTN  - c/w home metoprolol for now, monitor vitals closely.     #Proteinuria  - noted to have proteinuria previously, urine protein  - ?possible in relationship with the infection. Monitor for now as asx.     Rest of plan as above. I have personally seen and examined patient on the above date. I discussed the case with and I agree with the findings and plan as detailed per note above, which I have amended where appropriate.    Ms. Garza is a 28 y/o F with PMH of HTN who presented to [facility] with ongoing worsening lower abd pain, fevers, chills for the past 2 weeks, previously seen in [facility] ED on 11/5/24, found with UTI, discharged on cefpodoxime, presented back to [facility] ED on 11/10 with similar sx and discharged from ED and now admitted due to persistent nature of the abdominal pain for further evaluation.     Pt seen and examined this morning, reporting still some abd pain, improving slightly from prior. Afebrile overnight, later in the morning noted to be febrile to 101.4. No hematuria, melena, hematochezia, reporting some improvement in abd pain as well.     PE as above     #SIRS + on admission   - febrile to 100.9, Tachycardic to 100s  - vitals stable   - monitor fever curve    #Lower abd pain - ?PID vs ?ruptured ovarian cyst vs alt etiology   #Fibroids   - CT AP notable for: loculated perisplenic fluid along with L paracolic gutter, multiple heterogenous uterine masses likely fibroids, nonspecific soft tissue nodules/lymph nodes in RLQ and at the level of aortic bifurcation    - TVUS on 11/15 -> negative for torsion, fibroid uterus, 1.1cm echogenic L ovarian torsion may represent a hemorrhagic cyst or corpus albicans   - GYN consulted in ED:  - UA on 11/10 notable for: cloudy, orange urine, trace LE, +bacteria, WBC 17, +blood, CFU <10K. RVP negative.   - started on CTX/Doxycycline overnight. Add flagyl. Again febrile this morning, given recurrent fevers without clear source, will consult ID team for further management and adjustment of abx therapy  - F.u BCX   - may need op urine culture results for further investigation   - HIV negative   - f/u GC chlyamdia, drug screen, syphilis screen   - monitor fever curve  - tylenol prn     #HTN  - c/w home metoprolol for now, monitor vitals closely.     #SILVESTRE   - Mildly elevated at 1.3  - resolved on IVF  - monitor     #Proteinuria  - noted to have proteinuria previously, urine protein  - ?possible in relationship with the infection. Monitor for now as asx.     Rest of plan as above.

## 2024-11-16 NOTE — H&P ADULT - ASSESSMENT
29 y.o. female with PHM HTN presenting with lower abdominal pain, fever and chills for the past 2 weeks. Recently completed course of antibiotics for UTI.

## 2024-11-16 NOTE — PROGRESS NOTE ADULT - PROBLEM SELECTOR PLAN 7
DVT PPx: SCDs   E: replete K<4, Mg<2  GI PPx: Not indicated  Diet: Regular, as tolerated  Code Status: Full  Dispo: pending medical improvement

## 2024-11-16 NOTE — H&P ADULT - NSICDXFAMILYHX_GEN_ALL_CORE_FT
FAMILY HISTORY:  Sibling  Still living? Unknown  Family history of systemic lupus erythematosus, Age at diagnosis: Age Unknown

## 2024-11-16 NOTE — CONSULT NOTE ADULT - ASSESSMENT
29 y.o. female with PHM HTN presenting with lower abdominal pain, fever and chills for the past 2 weeks    Assessment:  #Fever  #Abdominal pain  -Pt febrile in the ED, normal WBC  -UA+, BCX pending  -CT A/P Partially loculated perisplenic fluid tracking along the left paracolic gutter.  Multiple heterogeneous uterine masses likely representing fibroids. Questionable masslike hypoattenuation of the cervical region, which may represent normal appearance of the cervix, however an underlying mass cannot be excluded. Recommend further evaluation with pelvic ultrasound or nonemergent contrast-enhanced pelvic MRI. Nonspecific soft tissue nodules or lymph nodes in the right lower   quadrant and at the level of the aortic bifurcation.  -Transvaginal US: No sonographic evidence of ovarian torsion. Fibroid uterus. A 1.1 cm echogenic left ovarian lesion may represent a hemorrhagic cyst or corpus albicans.    Recommendation:     29 y.o. female with PHM HTN presenting with lower abdominal pain, fever and chills for the past 2 weeks    Assessment:  #Fever  #Abdominal pain  -UTI s/p cefpodoxime 200mg BID treatment at home for UTI  -Pt febrile in the ED, normal WBC  -UA+, BCX pending  -HIV neg   -CT A/P Partially loculated perisplenic fluid tracking along the left paracolic gutter.  Multiple heterogeneous uterine masses likely representing fibroids. Questionable masslike hypoattenuation of the cervical region, which may represent normal appearance of the cervix, however an underlying mass cannot be excluded. Recommend further evaluation with pelvic ultrasound or nonemergent contrast-enhanced pelvic MRI. Nonspecific soft tissue nodules or lymph nodes in the right lower   quadrant and at the level of the aortic bifurcation.  -Transvaginal US: No sonographic evidence of ovarian torsion. Fibroid uterus. A 1.1 cm echogenic left ovarian lesion may represent a hemorrhagic cyst or corpus albicans.    Recommendation:  -fever likely 2/2 GI vs  source   -Cont Ceftriaxone, flagyl and doxy   -f/u blood cultures, urine culture, EBV, syphilis, Chlamydia/GC/Trich  -Cont to monitor fever and WBC curve    Marilyn Jacobs  ID Fellow

## 2024-11-16 NOTE — H&P ADULT - NSHPLABSRESULTS_GEN_ALL_CORE
LABS:                         9.5    5.38  )-----------( 207      ( 15 Nov 2024 15:30 )             29.8     11-15    138  |  101  |  10  ----------------------------<  84  3.7   |  25  |  1.33[H]    Ca    8.7      15 Nov 2024 15:30    TPro  8.0  /  Alb  3.4  /  TBili  0.5  /  DBili  x   /  AST  40[H]  /  ALT  17  /  AlkPhos  58  11-15      Urinalysis Basic - ( 15 Nov 2024 15:30 )    Color: Yellow / Appearance: Turbid / S.020 / pH: x  Gluc: 84 mg/dL / Ketone: Trace mg/dL  / Bili: Negative / Urobili: 1.0 mg/dL   Blood: x / Protein: 300 mg/dL / Nitrite: Negative   Leuk Esterase: Trace / RBC: 126 /HPF / WBC 15 /HPF   Sq Epi: x / Non Sq Epi: 14 /HPF / Bacteria: Occasional /HPF            RADIOLOGY, EKG & ADDITIONAL TESTS: LABS:                         9.5    5.38  )-----------( 207      ( 15 Nov 2024 15:30 )             29.8     11-15    138  |  101  |  10  ----------------------------<  84  3.7   |  25  |  1.33[H]    Ca    8.7      15 Nov 2024 15:30    TPro  8.0  /  Alb  3.4  /  TBili  0.5  /  DBili  x   /  AST  40[H]  /  ALT  17  /  AlkPhos  58  11-15      Urinalysis Basic - ( 15 Nov 2024 15:30 )    Color: Yellow / Appearance: Turbid / S.020 / pH: x  Gluc: 84 mg/dL / Ketone: Trace mg/dL  / Bili: Negative / Urobili: 1.0 mg/dL   Blood: x / Protein: 300 mg/dL / Nitrite: Negative   Leuk Esterase: Trace / RBC: 126 /HPF / WBC 15 /HPF   Sq Epi: x / Non Sq Epi: 14 /HPF / Bacteria: Occasional /HPF            RADIOLOGY, EKG & ADDITIONAL TESTS:  US Transvaginal (11.15.24 @ 21:29)    IMPRESSION:  - No sonographic evidence of ovarian torsion.  - Fibroid uterus.  - A 1.1 cm echogenic left ovarian lesion may represent a hemorrhagic cyst   or corpus albicans.          CT Abdomen and Pelvis w/ IV Cont (11.15.24 @ 18:07)     IMPRESSION:  Partially loculated perisplenic fluid tracking along the left paracolic   gutter.    Multiple heterogeneous uterine masses likely representing fibroids.   Questionable masslike hypoattenuation of the cervical region, which may   represent normal appearance of the cervix, however an underlying mass   cannot be excluded. Recommend further evaluation with pelvic ultrasound   or nonemergent contrast-enhanced pelvic MRI.    Nonspecific soft tissue nodules or lymph nodes in the right lower   quadrant and at the level of the aortic bifurcation.

## 2024-11-16 NOTE — H&P ADULT - NSHPPHYSICALEXAM_GEN_ALL_CORE
Vital Signs Last 24 Hrs  T(C): 36.8 (15 Nov 2024 22:36), Max: 38.3 (15 Nov 2024 18:54)  T(F): 98.2 (15 Nov 2024 22:36), Max: 100.9 (15 Nov 2024 18:54)  HR: 99 (15 Nov 2024 22:36) (97 - 106)  BP: 134/90 (15 Nov 2024 22:36) (126/78 - 134/90)  BP(mean): 96 (15 Nov 2024 22:03) (96 - 96)  RR: 17 (15 Nov 2024 22:36) (16 - 18)  SpO2: 98% (15 Nov 2024 22:36) (98% - 100%)    Parameters below as of 15 Nov 2024 22:36  Patient On (Oxygen Delivery Method): room air      PHYSICAL EXAM:  GENERAL: NAD, well-groomed, well-developed  HEAD:  Atraumatic, Normocephalic  EYES: Conjunctiva and sclera clear  ENMT: Moist mucous membranes  HEART: Regular rate and rhythm; No murmurs, rubs, or gallops  RESPIRATORY: CTA B/L, No W/R/R  ABDOMEN: Soft, Nontender, Nondistended; Bowel sounds present  NEUROLOGY: A&Ox3, nonfocal, moving all extremities  EXTREMITIES:  2+ Peripheral Pulses, No clubbing, cyanosis, or edema  SKIN: warm, dry, normal color, no rash or abnormal lesions Vital Signs Last 24 Hrs  T(C): 36.8 (15 Nov 2024 22:36), Max: 38.3 (15 Nov 2024 18:54)  T(F): 98.2 (15 Nov 2024 22:36), Max: 100.9 (15 Nov 2024 18:54)  HR: 99 (15 Nov 2024 22:36) (97 - 106)  BP: 134/90 (15 Nov 2024 22:36) (126/78 - 134/90)  BP(mean): 96 (15 Nov 2024 22:03) (96 - 96)  RR: 17 (15 Nov 2024 22:36) (16 - 18)  SpO2: 98% (15 Nov 2024 22:36) (98% - 100%)    Parameters below as of 15 Nov 2024 22:36  Patient On (Oxygen Delivery Method): room air      PHYSICAL EXAM:  GENERAL: NAD, well-groomed, well-developed  EYES: Conjunctiva and sclera clear  NECK: Lymphoadenopathy, anterior cervical, L. posterior auricular.   ENMT: Moist mucous membranes  HEART: Regular rate and rhythm; No murmurs, rubs, or gallops  RESPIRATORY: CTA B/L, No W/R/R  ABDOMEN: Soft, Nontender, Nondistended; Bowel sounds present. No CVA tenderness. Negative Sebastian's sign.  NEUROLOGY: A&Ox3, nonfocal, moving all extremities  EXTREMITIES:  2+ Peripheral Pulses, No clubbing, cyanosis, or edema  SKIN: warm, dry, normal color, no rash or abnormal lesions

## 2024-11-16 NOTE — PROGRESS NOTE ADULT - SUBJECTIVE AND OBJECTIVE BOX
Progress Note    11-15-24 (1d)    Patient is a 29y old  Female who presents with a chief complaint of Abdominal Pain, Fever (2024 00:46)      Subjective / Overnight Events :  - No acute events overnight.  - Pt seen and examined at bedside.     Hospital Course:      Additional ROS (if any):    MEDICATIONS  (STANDING):  cefTRIAXone   IVPB 1000 milliGRAM(s) IV Intermittent every 24 hours  doxycycline IVPB 100 milliGRAM(s) IV Intermittent every 12 hours  influenza   Vaccine 0.5 milliLiter(s) IntraMuscular once  metoprolol succinate  milliGRAM(s) Oral daily  sodium chloride 0.9%. 1000 milliLiter(s) (100 mL/Hr) IV Continuous <Continuous>    MEDICATIONS  (PRN):  acetaminophen     Tablet .. 650 milliGRAM(s) Oral every 6 hours PRN Temp greater or equal to 38C (100.4F), Mild Pain (1 - 3)  aluminum hydroxide/magnesium hydroxide/simethicone Suspension 30 milliLiter(s) Oral every 4 hours PRN Dyspepsia  melatonin 3 milliGRAM(s) Oral at bedtime PRN Insomnia      CAPILLARY BLOOD GLUCOSE        I&O's Summary      PHYSICAL EXAM:  Vital Signs Last 24 Hrs  T(C): 36.9 (2024 05:10), Max: 38.3 (15 Nov 2024 18:54)  T(F): 98.4 (2024 05:10), Max: 100.9 (15 Nov 2024 18:54)  HR: 103 (2024 05:10) (97 - 106)  BP: 133/98 (2024 05:10) (126/78 - 134/90)  BP(mean): 96 (15 Nov 2024 22:03) (96 - 96)  RR: 17 (2024 05:10) (16 - 18)  SpO2: 100% (2024 05:10) (98% - 100%)    Parameters below as of 2024 05:10  Patient On (Oxygen Delivery Method): room air        General: NAD, non-toxic appearing   HEENT: PERRLA, EOMi, no scleral icterus  CV: RRR, normal S1 and S2, no m/r/g  Lungs: normal respiratory effort. CTAB, no wheezes, rales, or rhonchi  Abd: soft, nontender, nondistended  Ext: no edema, 2+ peripheral pulses   Pysch: AAOx3, appropriate affect   Neuro: grossly non-focal, moving all extremities spontaneously   Skin: no rashes or lesions     LABS:                          9.0    4.38  )-----------( 193      ( 2024 07:23 )             28.1       WBC Trend: 4.38<--, 5.38<--, 6.43<--  Hb Trend: 9.0<--, 9.5<--, 9.7<--    11-15    138  |  101  |  10  ----------------------------<  84  3.7   |  25  |  1.33[H]    Ca    8.7      15 Nov 2024 15:30    TPro  8.0  /  Alb  3.4  /  TBili  0.5  /  DBili  x   /  AST  40[H]  /  ALT  17  /  AlkPhos  58  11-15          Urinalysis Basic - ( 15 Nov 2024 15:30 )    Color: Yellow / Appearance: Turbid / S.020 / pH: x  Gluc: 84 mg/dL / Ketone: Trace mg/dL  / Bili: Negative / Urobili: 1.0 mg/dL   Blood: x / Protein: 300 mg/dL / Nitrite: Negative   Leuk Esterase: Trace / RBC: 126 /HPF / WBC 15 /HPF   Sq Epi: x / Non Sq Epi: 14 /HPF / Bacteria: Occasional /HPF        Urinalysis with Rflx Culture (collected 15 Nov 2024 15:30)          RADIOLOGY & ADDITIONAL TESTS: Reviewed Progress Note    11-15-24 (1d)    Patient is a 29y old  Female who presents with a chief complaint of Abdominal Pain, Fever (2024 00:46)    HPI:  29 y.o. female with PHM HTN on metoprolol succpresenting with lower abdominal pain, fever and chills for the past 2 weeks. Patient states she was seen in [facility] ED on ; was told she has a UTI, started on cefpodixime but pt is unsure if urine cx was sent. Patient was then seen at [facility] on 11/10/24 for similar sx- found to have elevated D Dimer, negative CTPE but ct a/p showed small amount of perisplenic fluid. Patient was recommended to f/u with GI outpatient. There was also concern for a possibly nephrology issue at that time due to protein >1000 in urine, patient was told to f/u outpatient with nephrology. Pt states she has a nephrology appointment on 24 but did not schedule the GI appointment as yet.     Today patient describes her abdominal pain as dull, cramps, waxing/waning, 3-4 /10 in severity, localized in lower abdomen b/l. No radiation. Patient states eating makes the pain worse. Pain is slightly alleviated with heating pad and tylenol. She states her pain has not worsened but she is concerned because her pain is still persistent for 2 weeks and has not resolved after finishing course of antibiotics. Patient endorses nausea and decr. po intake but denies vomiting, diarrhea, hematochezia, melena. Denies dysuria, flank pain, vaginal bleeding, vaginal discharge. Patient also endorses fevers (Tmax 101 F, 2 days ago), chills, generalized weakness. Patient states she is sexually active, states she does not regularly use protection. Denies hx. STI. Additionally patient states her BP has been elevated, at home it was 151/103. She restarted her metoprolol 100mg daily yesterday after seeing PCP, prior she had not been on any BP medication for months. Endorses SOB. Denies CP, syncopal episodes, lightheadedness.    Overnight:  - additionally: LMP 10/ 7d with 2-3 tampons a day, experience premenstrual syndrome, current abdominal pain similar   - Hasn't been sexually active for 6 months, denied barrier use, on contraception    Additional ROS (if any):    MEDICATIONS  (STANDING):  cefTRIAXone   IVPB 1000 milliGRAM(s) IV Intermittent every 24 hours  doxycycline IVPB 100 milliGRAM(s) IV Intermittent every 12 hours  influenza   Vaccine 0.5 milliLiter(s) IntraMuscular once  metoprolol succinate  milliGRAM(s) Oral daily  sodium chloride 0.9%. 1000 milliLiter(s) (100 mL/Hr) IV Continuous <Continuous>    MEDICATIONS  (PRN):  acetaminophen     Tablet .. 650 milliGRAM(s) Oral every 6 hours PRN Temp greater or equal to 38C (100.4F), Mild Pain (1 - 3)  aluminum hydroxide/magnesium hydroxide/simethicone Suspension 30 milliLiter(s) Oral every 4 hours PRN Dyspepsia  melatonin 3 milliGRAM(s) Oral at bedtime PRN Insomnia      CAPILLARY BLOOD GLUCOSE        I&O's Summary      PHYSICAL EXAM:  Vital Signs Last 24 Hrs  T(C): 36.9 (2024 05:10), Max: 38.3 (15 Nov 2024 18:54)  T(F): 98.4 (2024 05:10), Max: 100.9 (15 Nov 2024 18:54)  HR: 103 (2024 05:10) (97 - 106)  BP: 133/98 (2024 05:10) (126/78 - 134/90)  BP(mean): 96 (15 Nov 2024 22:03) (96 - 96)  RR: 17 (2024 05:10) (16 - 18)  SpO2: 100% (2024 05:10) (98% - 100%)    Parameters below as of 2024 05:10  Patient On (Oxygen Delivery Method): room air        General: NAD, non-toxic appearing   HEENT:  EOMi, no scleral icterus, cervical lymphadenopathy  CV: RRR, normal S1 and S2, no m/r/g  Lungs: normal respiratory effort. CTAB, no wheezes, rales, or rhonchi  Abd: soft, lower abdominal pain, LLQ abdominal pain, no peritoneal signs, no rebound  Ext: no edema, 2+ peripheral pulses   Pysch: AAOx3, appropriate affect   Neuro: grossly non-focal, moving all extremities spontaneously   Skin: no rashes or lesions     LABS:                          9.0    4.38  )-----------( 193      ( 2024 07:23 )             28.1       WBC Trend: 4.38<--, 5.38<--, 6.43<--  Hb Trend: 9.0<--, 9.5<--, 9.7<--    11-15    138  |  101  |  10  ----------------------------<  84  3.7   |  25  |  1.33[H]    Ca    8.7      15 Nov 2024 15:30    TPro  8.0  /  Alb  3.4  /  TBili  0.5  /  DBili  x   /  AST  40[H]  /  ALT  17  /  AlkPhos  58  11-15          Urinalysis Basic - ( 15 Nov 2024 15:30 )    Color: Yellow / Appearance: Turbid / S.020 / pH: x  Gluc: 84 mg/dL / Ketone: Trace mg/dL  / Bili: Negative / Urobili: 1.0 mg/dL   Blood: x / Protein: 300 mg/dL / Nitrite: Negative   Leuk Esterase: Trace / RBC: 126 /HPF / WBC 15 /HPF   Sq Epi: x / Non Sq Epi: 14 /HPF / Bacteria: Occasional /HPF        Urinalysis with Rflx Culture (collected 15 Nov 2024 15:30)          RADIOLOGY & ADDITIONAL TESTS: Reviewed Progress Note    11-15-24 (1d)    Patient is a 29y old  Female who presents with a chief complaint of Abdominal Pain, Fever (2024 00:46)    HPI:  29 y.o. female with PHM HTN on metoprolol succpresenting with lower abdominal pain, fever and chills for the past 2 weeks. Patient states she was seen in [facility] ED on ; was told she has a UTI, started on cefpodixime but pt is unsure if urine cx was sent. Patient was then seen at [facility] on 11/10/24 for similar sx- found to have elevated D Dimer, negative CTPE but ct a/p showed small amount of perisplenic fluid. Patient was recommended to f/u with GI outpatient. There was also concern for a possibly nephrology issue at that time due to protein >1000 in urine, patient was told to f/u outpatient with nephrology. Pt states she has a nephrology appointment on 24 but did not schedule the GI appointment as yet.     Today patient describes her abdominal pain as dull, cramps, waxing/waning, 3-4 /10 in severity, localized in lower abdomen b/l. No radiation. Patient states eating makes the pain worse. Pain is slightly alleviated with heating pad and tylenol. She states her pain has not worsened but she is concerned because her pain is still persistent for 2 weeks and has not resolved after finishing course of antibiotics. Patient endorses nausea and decr. po intake but denies vomiting, diarrhea, hematochezia, melena. Denies dysuria, flank pain, vaginal bleeding, vaginal discharge. Patient also endorses fevers (Tmax 101 F, 2 days ago), chills, generalized weakness. Patient states she is sexually active, states she does not regularly use protection. Denies hx. STI. Additionally patient states her BP has been elevated, at home it was 151/103. She restarted her metoprolol 100mg daily yesterday after seeing PCP, prior she had not been on any BP medication for months. Endorses SOB. Denies CP, syncopal episodes, lightheadedness.    Overnight:  - additionally: LMP 10/ 7d with 2-3 tampons a day, experience premenstrual syndrome, current abdominal pain similar   - Hasn't been sexually active for 6 months, denied barrier use, on contraception    Additional ROS (if any):    MEDICATIONS  (STANDING):  cefTRIAXone   IVPB 1000 milliGRAM(s) IV Intermittent every 24 hours  doxycycline IVPB 100 milliGRAM(s) IV Intermittent every 12 hours  influenza   Vaccine 0.5 milliLiter(s) IntraMuscular once  metoprolol succinate  milliGRAM(s) Oral daily  sodium chloride 0.9%. 1000 milliLiter(s) (100 mL/Hr) IV Continuous <Continuous>    MEDICATIONS  (PRN):  acetaminophen     Tablet .. 650 milliGRAM(s) Oral every 6 hours PRN Temp greater or equal to 38C (100.4F), Mild Pain (1 - 3)  aluminum hydroxide/magnesium hydroxide/simethicone Suspension 30 milliLiter(s) Oral every 4 hours PRN Dyspepsia  melatonin 3 milliGRAM(s) Oral at bedtime PRN Insomnia      CAPILLARY BLOOD GLUCOSE        I&O's Summary      PHYSICAL EXAM:  Vital Signs Last 24 Hrs  T(C): 36.9 (2024 05:10), Max: 38.3 (15 Nov 2024 18:54)  T(F): 98.4 (2024 05:10), Max: 100.9 (15 Nov 2024 18:54)  HR: 103 (2024 05:10) (97 - 106)  BP: 133/98 (2024 05:10) (126/78 - 134/90)  BP(mean): 96 (15 Nov 2024 22:03) (96 - 96)  RR: 17 (2024 05:10) (16 - 18)  SpO2: 100% (2024 05:10) (98% - 100%)    Parameters below as of 2024 05:10  Patient On (Oxygen Delivery Method): room air      General: NAD, non-toxic appearing, talking in full sentences, on ra, no accessory mm use   HEENT:  EOMi, no scleral icterus, no cervical lymphadenopathy, MMM, PEERL  CV: RRR, normal S1 and S2, no m/r/g  Lungs: normal respiratory effort. CTAB, no wheezes, rales, or rhonchi  Abd: soft, lower abdominal pain, LLQ abdominal pain, no peritoneal signs, no rebound tenderness; no cva tenderness   Ext: no edema, 2+ peripheral pulses   Pysch: AAOx3, appropriate affect   Neuro: grossly non-focal, moving all extremities spontaneously   Skin: no rashes or lesions     LABS:                          9.0    4.38  )-----------( 193      ( 2024 07:23 )             28.1       WBC Trend: 4.38<--, 5.38<--, 6.43<--  Hb Trend: 9.0<--, 9.5<--, 9.7<--    11-15    138  |  101  |  10  ----------------------------<  84  3.7   |  25  |  1.33[H]    Ca    8.7      15 Nov 2024 15:30    TPro  8.0  /  Alb  3.4  /  TBili  0.5  /  DBili  x   /  AST  40[H]  /  ALT  17  /  AlkPhos  58  11-15          Urinalysis Basic - ( 15 Nov 2024 15:30 )    Color: Yellow / Appearance: Turbid / S.020 / pH: x  Gluc: 84 mg/dL / Ketone: Trace mg/dL  / Bili: Negative / Urobili: 1.0 mg/dL   Blood: x / Protein: 300 mg/dL / Nitrite: Negative   Leuk Esterase: Trace / RBC: 126 /HPF / WBC 15 /HPF   Sq Epi: x / Non Sq Epi: 14 /HPF / Bacteria: Occasional /HPF        Urinalysis with Rflx Culture (collected 15 Nov 2024 15:30)          RADIOLOGY & ADDITIONAL TESTS: Reviewed

## 2024-11-16 NOTE — PROGRESS NOTE ADULT - PROBLEM SELECTOR PLAN 4
DVT PPx: SCDs   E: replete K<4, Mg<2  GI PPx: Not indicated  Diet: Regular, as tolerated  Code Status: Full  Dispo: pending medical improvement - c/w home metoprolol 100mg daily repeat UA this admission, likely contaminated.     Plan:  - abx as above  -  f/u urine cx. from this admission

## 2024-11-16 NOTE — H&P ADULT - PROBLEM SELECTOR PLAN 4
DVT PPx: SCDs   E: replete K<4, Mg<2  GI PPx: Protonix  Diet: Regular, as tolerated  Code Status: Full  Dispo: pending medical improvement DVT PPx: SCDs   E: replete K<4, Mg<2  GI PPx: Not indicated  Diet: Regular, as tolerated  Code Status: Full  Dispo: pending medical improvement

## 2024-11-16 NOTE — H&P ADULT - NSHPREVIEWOFSYSTEMS_GEN_ALL_CORE
REVIEW OF SYSTEMS:    CONSTITUTIONAL: No weakness, fevers or chills  EYES/ENT: No visual changes;  No vertigo or throat pain   NECK: No pain or stiffness  RESPIRATORY: No cough, wheezing, hemoptysis; No shortness of breath  CARDIOVASCULAR: No chest pain or palpitations  GASTROINTESTINAL: No abdominal or epigastric pain. No nausea, vomiting, or hematemesis; No diarrhea or constipation. No melena or hematochezia.  GENITOURINARY: No dysuria, frequency or hematuria  NEUROLOGICAL: No numbness or weakness  SKIN: No itching, rashes  MUSCULOSKELETAL: No joint pain, muscle pain. REVIEW OF SYSTEMS:    CONSTITUTIONAL: Weakness, fevers or chills  EYES/ENT: No visual changes, throat pain   NECK: No pain or stiffness  RESPIRATORY: Cough, SOB. No wheezing, hemoptysis.  CARDIOVASCULAR: No chest pain or palpitations  GASTROINTESTINAL: Abdominal pain, nausea. No vomiting, or hematemesis; Constipation. No diarrhea. No melena or hematochezia.  GENITOURINARY: No dysuria, frequency or hematuria  NEUROLOGICAL: No numbness or weakness  SKIN: No itching, rashes  MUSCULOSKELETAL: No joint pain, muscle pain.

## 2024-11-16 NOTE — PROGRESS NOTE ADULT - PROBLEM SELECTOR PLAN 2
- s/p cefpodoxime 200mg BID x 7 days treatment at home for UTI, last dose today  - Urine culture 11/10 showed normal urogenital fluora  - repeat UA this admission, likely contaminated.   -  f/u urine cx. from this admission 11/15 CT A/P LYMPH NODES: Right lower quadrant, anterior to the psoas muscle (301 -60), 1.7 x 1.3 cm nodule or lymph node. Additional nodule/node at the level of the aortic bifurcation (301-59), 1.7 x 1 cm  11/10: Mild bilateral axillary lymphadenopathy.   cervical lymphadenopathy on examine  painless    Plan:  - HIV, EBV, Syphilis, Differential includes GYN etiology ruptured ovarian cyst vs PMD/fibroid vs. endometriosis vs infectious causes PID vs  pyelonephritis  BHCG -ve  11/15 CT abdomen reveals partially loculated perisplenic fluid. Multiple uterine masses likely representing fibroids. Nonspecific soft tissue nodules or lymph nodes in RLQ and level of aortic bifurfication.  Patient was seen by Ob/gyn in ED, low clinical suspicion for PID at this time given no CMT.  11/15 TVUS -ve for torsion; > 1.1 cm echogenic left ovarian lesion may represent a hemorrhagic cyst or corpus albicans. Fibroid uterus.      Plan  - workup as above  - PO or IV pain meds for relief  - Gyn consulted, no acute intervention, Differential includes GYN etiology ruptured ovarian cyst vs PMD/fibroid vs. endometriosis vs infectious causes PID vs  pyelonephritis  BHCG -ve  11/15 CT abdomen reveals partially loculated perisplenic fluid. Multiple uterine masses likely representing fibroids. Nonspecific soft tissue nodules or lymph nodes in RLQ and level of aortic bifurfication.  Patient was seen by Ob/gyn in ED, low clinical suspicion for PID at this time given no CMT.  11/15 TVUS -ve for torsion; > 1.1 cm echogenic left ovarian lesion may represent a hemorrhagic cyst or corpus albicans. Fibroid uterus.      Plan  - workup as above  - PO or IV pain meds for relief  - Gyn consulted on admission, no acute intervention

## 2024-11-16 NOTE — H&P ADULT - PROBLEM SELECTOR PLAN 1
Differential includes GYN etiology (ovarian torsion  vs. ovarian cyst rupture/leakage vs. endometriosis vs. PID vs. fibroid)  vs. GI etiology (gastroenteritis)  vs.  etiology (UTI vs. pyelonephritis)     - Patient febrile, chills, nausea. No diarrhea.  - Hcg negative  - CT abdomen reveals partially loculated perisplenic fluid. Multiple uterine masses likely representing fibroids. Nonspecific soft tissue nodules or lymph nodes in RLQ and level of aortic bifurfication.  - Patient was seen by Ob/gyn in ED, low clinical suspicion for PID at this time given no CMT.  - TVUS reveals  > 1.1 cm echogenic left ovarian lesion may represent a hemorrhagic cyst   or corpus albicans.  Plan  - urine GC/CT   - f/u urine culture, blood cx.  -PO or IV pain meds for relief  -No acute GYN intervention at this time   - can Ceftriaxone 250 mg IM x1, Doxycycline 100 mg BID x 14 days +/-  Flagyl 500 mg BID x 14 days for possible PID if all other workup negative Differential includes GYN etiology (ovarian torsion  vs. ovarian cyst rupture/leakage vs. endometriosis vs. PID vs. fibroid)  vs. GI etiology (gastroenteritis)  vs.  etiology (UTI vs. pyelonephritis)     - Patient febrile, chills, nausea. No diarrhea.  - Hcg negative  - CT abdomen reveals partially loculated perisplenic fluid. Multiple uterine masses likely representing fibroids. Nonspecific soft tissue nodules or lymph nodes in RLQ and level of aortic bifurfication.  - Patient was seen by Ob/gyn in ED, low clinical suspicion for PID at this time given no CMT.  - TVUS reveals  > 1.1 cm echogenic left ovarian lesion may represent a hemorrhagic cyst   or corpus albicans. Fibroid uterus.  Plan  - urine GC/CT   - f/u urine culture, blood cx.  -PO or IV pain meds for relief  -No acute GYN intervention at this time   - can Ceftriaxone 250 mg IM x1, Doxycycline 100 mg BID x 14 days +/-  Flagyl 500 mg BID x 14 days for possible PID if all other workup negative Differential includes GYN etiology (ovarian torsion  vs. ovarian cyst rupture/leakage vs. endometriosis vs. PID vs. fibroid)  vs. GI etiology (gastroenteritis)  vs.  etiology (UTI vs. pyelonephritis)     - Patient febrile, chills, nausea. No diarrhea.  - Hcg negative  - CT abdomen reveals partially loculated perisplenic fluid. Multiple uterine masses likely representing fibroids. Nonspecific soft tissue nodules or lymph nodes in RLQ and level of aortic bifurfication.  - Patient was seen by Ob/gyn in ED, low clinical suspicion for PID at this time given no CMT.  - TVUS reveals  > 1.1 cm echogenic left ovarian lesion may represent a hemorrhagic cyst   or corpus albicans. Fibroid uterus.  Plan  - urine GC/CT  - f/u urine culture, blood cx.  - PO or IV pain meds for relief  - No acute GYN intervention at this time   -  Ceftriaxone 1g IV daily, Doxycycline 100 mg BID x 14 days +/-  Flagyl 500 mg BID x 14 days for possible PID

## 2024-11-16 NOTE — PROGRESS NOTE ADULT - PROBLEM SELECTOR PLAN 1
Differential includes GYN etiology (ovarian torsion  vs. ovarian cyst rupture/leakage vs. endometriosis vs. PID vs. fibroid)  vs. GI etiology (gastroenteritis)  vs.  etiology (UTI vs. pyelonephritis)     - Patient febrile, chills, nausea. No diarrhea.  - Hcg negative  - CT abdomen reveals partially loculated perisplenic fluid. Multiple uterine masses likely representing fibroids. Nonspecific soft tissue nodules or lymph nodes in RLQ and level of aortic bifurfication.  - Patient was seen by Ob/gyn in ED, low clinical suspicion for PID at this time given no CMT.  - TVUS reveals  > 1.1 cm echogenic left ovarian lesion may represent a hemorrhagic cyst   or corpus albicans. Fibroid uterus.  Plan  - urine GC/CT  - f/u urine culture, blood cx.  - PO or IV pain meds for relief  - No acute GYN intervention at this time   -  Ceftriaxone 1g IV daily, Doxycycline 100 mg BID x 14 days +/-  Flagyl 500 mg BID x 14 days for possible PID Differential includes GYN etiology (ovarian cyst rupture vs. endometriosis vs. PID vs. fibroid/PMD)  vs. GI etiology (gastroenteritis)  vs.  etiology (UTI vs. pyelonephritis)   BHCG -ve  CT abdomen reveals partially loculated perisplenic fluid. Multiple uterine masses likely representing fibroids. Nonspecific soft tissue nodules or lymph nodes in RLQ and level of aortic bifurfication.  Patient was seen by Ob/gyn in ED, low clinical suspicion for PID at this time given no CMT.  TVUS -ve for torsion reveals  > 1.1 cm echogenic left ovarian lesion may represent a hemorrhagic cyst or corpus albicans. Fibroid uterus.      Plan  - GC/CT NAAT  - f/u urine culture, blood cx.  - PO or IV pain meds for relief  - No acute GYN intervention at this time   -  Ceftriaxone 1g IV daily, Doxycycline 100 mg BID x 14 days +/-  Flagyl 500 mg BID x 14 days for possible PID normal wbc clinically unknown source: GYN vs  vs perisplenic loculated ascites  UTI s/p cefpodoxime 200mg BID treatment at home for UTI, last dose today,   11/10 [facility] Urine culture showed normal urogenital oanh  11/10 CT/PE bl axillary lymphadenopathy   11/15 CT abdomen reveals partially loculated perisplenic fluid. Multiple uterine masses likely representing fibroids. Nonspecific soft tissue nodules or lymph nodes in RLQ and level of aortic bifurfication.    Plan:  - f/u HIV, EBV, syphilis  - f/u Chlamydia/GC/Trich  - f/u Uclx, Bclx  - empirical PID coverage/: CTX, Doxy 100mg BID 14d, flagyl 500mg BID 14d  - ID consulted normal wbc clinically unknown source: GYN vs  vs perisplenic loculated ascites  UTI s/p cefpodoxime 200mg BID treatment at home for UTI, last dose today,   11/10 [facility] Urine culture showed normal urogenital oanh  11/10 CT/PE bl axillary lymphadenopathy   11/15 CT abdomen reveals partially loculated perisplenic fluid. Multiple uterine masses likely representing fibroids. Nonspecific soft tissue nodules or lymph nodes in RLQ and level of aortic bifurfication.    Plan:  - f/u HIV, EBV, syphilis  - f/u Chlamydia/GC/Trich  - f/u Uclx, Bclx  - empirical PID coverage/: CTX, Doxy 100mg BID 14d, flagyl 500mg BID 14d if no other source per GYN.   - ID consulted; further abx adjustment pending further ID evaluation

## 2024-11-17 DIAGNOSIS — D64.9 ANEMIA, UNSPECIFIED: ICD-10-CM

## 2024-11-17 LAB
A1C WITH ESTIMATED AVERAGE GLUCOSE RESULT: 5.4 % — SIGNIFICANT CHANGE UP (ref 4–5.6)
ALBUMIN SERPL ELPH-MCNC: 2.8 G/DL — LOW (ref 3.3–5)
ALP SERPL-CCNC: 52 U/L — SIGNIFICANT CHANGE UP (ref 40–120)
ALT FLD-CCNC: 8 U/L — SIGNIFICANT CHANGE UP (ref 4–33)
ANION GAP SERPL CALC-SCNC: 13 MMOL/L — SIGNIFICANT CHANGE UP (ref 7–14)
AST SERPL-CCNC: 26 U/L — SIGNIFICANT CHANGE UP (ref 4–32)
BASOPHILS # BLD AUTO: 0.02 K/UL — SIGNIFICANT CHANGE UP (ref 0–0.2)
BASOPHILS NFR BLD AUTO: 0.5 % — SIGNIFICANT CHANGE UP (ref 0–2)
BILIRUB SERPL-MCNC: 0.4 MG/DL — SIGNIFICANT CHANGE UP (ref 0.2–1.2)
BUN SERPL-MCNC: 10 MG/DL — SIGNIFICANT CHANGE UP (ref 7–23)
CALCIUM SERPL-MCNC: 8.2 MG/DL — LOW (ref 8.4–10.5)
CHLORIDE SERPL-SCNC: 105 MMOL/L — SIGNIFICANT CHANGE UP (ref 98–107)
CO2 SERPL-SCNC: 21 MMOL/L — LOW (ref 22–31)
CREAT SERPL-MCNC: 1.14 MG/DL — SIGNIFICANT CHANGE UP (ref 0.5–1.3)
EBV EA AB SER IA-ACNC: 12.8 U/ML — HIGH
EBV EA AB TITR SER IF: POSITIVE
EBV EA IGG SER-ACNC: POSITIVE
EBV NA IGG SER IA-ACNC: 435 U/ML — HIGH
EBV PATRN SPEC IB-IMP: SIGNIFICANT CHANGE UP
EBV VCA IGG AVIDITY SER QL IA: POSITIVE
EBV VCA IGM SER IA-ACNC: 13.4 U/ML — SIGNIFICANT CHANGE UP
EBV VCA IGM SER IA-ACNC: >750 U/ML — HIGH
EBV VCA IGM TITR FLD: NEGATIVE — SIGNIFICANT CHANGE UP
EGFR: 66 ML/MIN/1.73M2 — SIGNIFICANT CHANGE UP
EOSINOPHIL # BLD AUTO: 0.11 K/UL — SIGNIFICANT CHANGE UP (ref 0–0.5)
EOSINOPHIL NFR BLD AUTO: 2.8 % — SIGNIFICANT CHANGE UP (ref 0–6)
ESTIMATED AVERAGE GLUCOSE: 108 — SIGNIFICANT CHANGE UP
GLUCOSE SERPL-MCNC: 101 MG/DL — HIGH (ref 70–99)
HCT VFR BLD CALC: 26.2 % — LOW (ref 34.5–45)
HGB BLD-MCNC: 8.6 G/DL — LOW (ref 11.5–15.5)
IANC: 2.4 K/UL — SIGNIFICANT CHANGE UP (ref 1.8–7.4)
IMM GRANULOCYTES NFR BLD AUTO: 0.3 % — SIGNIFICANT CHANGE UP (ref 0–0.9)
LYMPHOCYTES # BLD AUTO: 1.12 K/UL — SIGNIFICANT CHANGE UP (ref 1–3.3)
LYMPHOCYTES # BLD AUTO: 28.2 % — SIGNIFICANT CHANGE UP (ref 13–44)
MAGNESIUM SERPL-MCNC: 1.6 MG/DL — SIGNIFICANT CHANGE UP (ref 1.6–2.6)
MCHC RBC-ENTMCNC: 31.9 PG — SIGNIFICANT CHANGE UP (ref 27–34)
MCHC RBC-ENTMCNC: 32.8 G/DL — SIGNIFICANT CHANGE UP (ref 32–36)
MCV RBC AUTO: 97 FL — SIGNIFICANT CHANGE UP (ref 80–100)
MONOCYTES # BLD AUTO: 0.31 K/UL — SIGNIFICANT CHANGE UP (ref 0–0.9)
MONOCYTES NFR BLD AUTO: 7.8 % — SIGNIFICANT CHANGE UP (ref 2–14)
NEUTROPHILS # BLD AUTO: 2.4 K/UL — SIGNIFICANT CHANGE UP (ref 1.8–7.4)
NEUTROPHILS NFR BLD AUTO: 60.4 % — SIGNIFICANT CHANGE UP (ref 43–77)
NRBC # BLD: 0 /100 WBCS — SIGNIFICANT CHANGE UP (ref 0–0)
NRBC # FLD: 0 K/UL — SIGNIFICANT CHANGE UP (ref 0–0)
PHOSPHATE SERPL-MCNC: 3.3 MG/DL — SIGNIFICANT CHANGE UP (ref 2.5–4.5)
PLATELET # BLD AUTO: 181 K/UL — SIGNIFICANT CHANGE UP (ref 150–400)
POTASSIUM SERPL-MCNC: 4 MMOL/L — SIGNIFICANT CHANGE UP (ref 3.5–5.3)
POTASSIUM SERPL-SCNC: 4 MMOL/L — SIGNIFICANT CHANGE UP (ref 3.5–5.3)
PROT SERPL-MCNC: 6.9 G/DL — SIGNIFICANT CHANGE UP (ref 6–8.3)
RBC # BLD: 2.7 M/UL — LOW (ref 3.8–5.2)
RBC # FLD: 12.8 % — SIGNIFICANT CHANGE UP (ref 10.3–14.5)
SODIUM SERPL-SCNC: 139 MMOL/L — SIGNIFICANT CHANGE UP (ref 135–145)
T PALLIDUM AB TITR SER: NEGATIVE — SIGNIFICANT CHANGE UP
WBC # BLD: 3.97 K/UL — SIGNIFICANT CHANGE UP (ref 3.8–10.5)
WBC # FLD AUTO: 3.97 K/UL — SIGNIFICANT CHANGE UP (ref 3.8–10.5)

## 2024-11-17 PROCEDURE — 99232 SBSQ HOSP IP/OBS MODERATE 35: CPT

## 2024-11-17 RX ORDER — FLUTICASONE PROPIONATE 50 MCG
2 SPRAY, SUSPENSION NASAL
Refills: 0 | Status: DISCONTINUED | OUTPATIENT
Start: 2024-11-17 | End: 2024-11-21

## 2024-11-17 RX ORDER — GUAIFENESIN 400 MG
100 TABLET ORAL ONCE
Refills: 0 | Status: COMPLETED | OUTPATIENT
Start: 2024-11-17 | End: 2024-11-21

## 2024-11-17 RX ORDER — ONDANSETRON HYDROCHLORIDE 4 MG/1
4 TABLET, FILM COATED ORAL ONCE
Refills: 0 | Status: COMPLETED | OUTPATIENT
Start: 2024-11-17 | End: 2024-11-17

## 2024-11-17 RX ORDER — ACETAMINOPHEN 500MG 500 MG/1
1000 TABLET, COATED ORAL ONCE
Refills: 0 | Status: COMPLETED | OUTPATIENT
Start: 2024-11-17 | End: 2024-11-17

## 2024-11-17 RX ADMIN — METRONIDAZOLE 100 MILLIGRAM(S): 500 TABLET ORAL at 18:59

## 2024-11-17 RX ADMIN — ACETAMINOPHEN 500MG 650 MILLIGRAM(S): 500 TABLET, COATED ORAL at 12:09

## 2024-11-17 RX ADMIN — METRONIDAZOLE 100 MILLIGRAM(S): 500 TABLET ORAL at 05:02

## 2024-11-17 RX ADMIN — Medication 2 SPRAY(S): at 18:14

## 2024-11-17 RX ADMIN — Medication 100 MILLIGRAM(S): at 05:57

## 2024-11-17 RX ADMIN — DOXYCYCLINE HYCLATE 100 MILLIGRAM(S): 150 TABLET, COATED ORAL at 18:59

## 2024-11-17 RX ADMIN — ACETAMINOPHEN 500MG 1000 MILLIGRAM(S): 500 TABLET, COATED ORAL at 22:20

## 2024-11-17 RX ADMIN — METOPROLOL TARTRATE 100 MILLIGRAM(S): 100 TABLET, FILM COATED ORAL at 05:02

## 2024-11-17 RX ADMIN — ACETAMINOPHEN 500MG 650 MILLIGRAM(S): 500 TABLET, COATED ORAL at 11:09

## 2024-11-17 RX ADMIN — ACETAMINOPHEN 500MG 400 MILLIGRAM(S): 500 TABLET, COATED ORAL at 21:04

## 2024-11-17 RX ADMIN — ONDANSETRON HYDROCHLORIDE 4 MILLIGRAM(S): 4 TABLET, FILM COATED ORAL at 21:04

## 2024-11-17 RX ADMIN — ENOXAPARIN SODIUM 40 MILLIGRAM(S): 30 INJECTION SUBCUTANEOUS at 21:13

## 2024-11-17 RX ADMIN — DOXYCYCLINE HYCLATE 100 MILLIGRAM(S): 150 TABLET, COATED ORAL at 05:57

## 2024-11-17 NOTE — PROVIDER CONTACT NOTE (OTHER) - SITUATION
hR: 112
BP: 133/98
Patient oral temperature 100.9 after 30 minute IV Tylenol reassessment
Temp: 100.9
Oral temperature 101.4, , /98  RR 17  SPO2 100%
temp 100.5
temp; 100.4
Oral temp 100.5

## 2024-11-17 NOTE — PROVIDER CONTACT NOTE (OTHER) - ACTION/TREATMENT ORDERED:
md notified and aware. ok to morning metoprolol for the HR.
IV Tylenol as per MDs orders
Pending MD orders
md notified and aware. IV tylenol will be ordered.
md, sue harrison, Tylenol given
MD notified and made aware. No new interventions at this time.
PO Tylenol administered as per orders
md notified and aware. ok to give ice pack. recheck temp later.

## 2024-11-17 NOTE — DISCHARGE NOTE PROVIDER - HOSPITAL COURSE
HPI:  29 y.o. female with PHM HTN presenting with lower abdominal pain, fever and chills for the past 2 weeks. Patient states she was seen in [facility] ED on 11/5; was told she has a UTI, started on cefpodixime but pt is unsure if urine cx was sent. Patient was then seen at [facility] on 11/10/24 for similar sx- found to have elevated D Dimer, negative CTPE but ct a/p showed small amount of perisplenic fluid. Patient was recommended to f/u with GI outpatient. There was also concern for a possibly nephrology issue at that time due to protein >1000 in urine, patient was told to f/u outpatient with nephrology. Pt states she has a nephrology appointment on 11/19/24 but did not schedule the GI appointment as yet.     Today patient describes her abdominal pain as dull, cramps, waxing/waning, 3-4 /10 in severity, localized in lower abdomen b/l. No radiation. Patient states eating makes the pain worse. Pain is slightly alleviated with heating pad and tylenol. She states her pain has not worsened but she is concerned because her pain is still persistent for 2 weeks and has not resolved after finishing course of antibiotics. Patient endorses nausea and decr. po intake but denies vomiting, diarrhea, hematochezia, melena. Denies dysuria, flank pain, vaginal bleeding, vaginal discharge. Patient also endorses fevers (Tmax 101 F, 2 days ago), chills, generalized weakness. Patient states she is sexually active, states she does not regularly use protection. Denies hx. STI. Additionally patient states her BP has been elevated, at home it was 151/103. She restarted her metoprolol 100mg daily yesterday after seeing PCP, prior she had not been on any BP medication for months. Endorses SOB. Denies CP, syncopal episodes, lightheadedness.     (16 Nov 2024 00:46)    Hospital Course:  Patient admitted for further management of SIRS and abdominal pain. CT A/P redemonstrated loculated perisplenic fluid and multiple heterogenous uterine masses, likely representing fibroids, with questionable masslike hypoattenuation of the cervical region as well as right lower quadrant soft tissue nodules vs lymph node. Prior CT PE during last hospitalization showed perisplenic ascites and diffuse bilateral axillary lymphadenopathy. Dedicated TVUS showed uterine fibroids and 1.1 cm echogenic left ovarian lesion may represent a hemorrhagic cyst or corpus albicans. No signs of ovarian torsion. BHcG was -ve. Gyn was consulted at the ED, examine inconsistent with PID. Patient was started on empiric PID coverage with CTX/doxy/flagyl. Further infection workup including blood clx, urine clx, HIV, syphilis, EBV were negative. ID was consulted. PID workup showed ***. Patient had improvement of abdominal pain. Abdominal pain likely 2/2 to recent ruptured ovarian cyst vs PMD.    On day of discharge, patient is clinically stable with no new exam findings or acute symptoms compared to prior. The patient was seen by the attending physician on the date of discharge and deemed stable and acceptable for discharge. The patient's chronic medical conditions were treated accordingly per the patient's home medication regimen. The patient's medication reconciliation, follow up appointments, discharge instructions, and significant lab and diagnostic studies are as noted.      Important Medication Changes and Reason:    Active or Pending Issues Requiring Follow-up:    Advanced Directives:   [x] Full code  [ ] DNR  [ ] Hospice    Discharge Diagnoses:  abdominal pain 2/2 ruptured ovarian cyst and PMD  ***         HPI:  29 y.o. female with PHM HTN presenting with lower abdominal pain, fever and chills for the past 2 weeks. Patient states she was seen in [facility] ED on 11/5; was told she has a UTI, started on cefpodixime but pt is unsure if urine cx was sent. Patient was then seen at [facility] on 11/10/24 for similar sx- found to have elevated D Dimer, negative CTPE but ct a/p showed small amount of perisplenic fluid. Patient was recommended to f/u with GI outpatient. There was also concern for a possibly nephrology issue at that time due to protein >1000 in urine, patient was told to f/u outpatient with nephrology. Pt states she has a nephrology appointment on 11/19/24 but did not schedule the GI appointment as yet.     Today patient describes her abdominal pain as dull, cramps, waxing/waning, 3-4 /10 in severity, localized in lower abdomen b/l. No radiation. Patient states eating makes the pain worse. Pain is slightly alleviated with heating pad and tylenol. She states her pain has not worsened but she is concerned because her pain is still persistent for 2 weeks and has not resolved after finishing course of antibiotics. Patient endorses nausea and decr. po intake but denies vomiting, diarrhea, hematochezia, melena. Denies dysuria, flank pain, vaginal bleeding, vaginal discharge. Patient also endorses fevers (Tmax 101 F, 2 days ago), chills, generalized weakness. Patient states she is sexually active, states she does not regularly use protection. Denies hx. STI. Additionally patient states her BP has been elevated, at home it was 151/103. She restarted her metoprolol 100mg daily yesterday after seeing PCP, prior she had not been on any BP medication for months. Endorses SOB. Denies CP, syncopal episodes, lightheadedness.     (16 Nov 2024 00:46)    Hospital Course:  Patient admitted for further management of SIRS and abdominal pain. CT A/P redemonstrated loculated perisplenic fluid and multiple heterogenous uterine masses, likely representing fibroids, with questionable masslike hypoattenuation of the cervical region as well as right lower quadrant soft tissue nodules vs lymph node. Prior CT PE during last hospitalization showed perisplenic ascites and diffuse bilateral axillary lymphadenopathy. Dedicated TVUS showed uterine fibroids and 1.1 cm echogenic left ovarian lesion may represent a hemorrhagic cyst or corpus albicans. No signs of ovarian torsion. BHcG was -ve. Gyn was consulted at the ED, examine inconsistent with PID. Patient was started on empiric PID coverage with CTX/doxy/flagyl. Further infection workup including blood clx, urine clx, HIV, syphilis, EBV were negative. Pt had proteinuria with elevated protein/Cr ration and SILVESTRE, UA was positive for WBC & blood, and pt was edematous concerning for nephritis vs nephrotic syndrome. 24hr urine protein collection showed >3.5gm, diagnostic of nephrotic syndrome. Rheumatologic work up showed + MILAGROS 1:160, +dsDNA and +RNP consistent with new diagnosis of Lupus complicated by likely lupus nephritis. Per ID, antibiotics were continued targeting PID with plan for 14 day course due to resolution of fevers after starting abx. Last day of abx to be 11/29. Decision was made not to drain perisplenic collection after diagnosis of nephrotic syndrome was made, as the fluid likely represents 3rd spacing in the setting of hypoalbuminuria as opposed to source of infection.    IR performed kidney biopsy on 11/22, pt already has rheumatologist and nephrologist which she will follow up with on DC.     On day of discharge, patient is clinically stable with no new exam findings or acute symptoms compared to prior. The patient was seen by the attending physician on the date of discharge and deemed stable and acceptable for discharge. The patient's chronic medical conditions were treated accordingly per the patient's home medication regimen. The patient's medication reconciliation, follow up appointments, discharge instructions, and significant lab and diagnostic studies are as noted.      Important Medication Changes and Reason:  START Doxycycline 100mg BID THROUGH 11/29  START Metronidazole 500mg BID THROUGH 11/29    Active or Pending Issues Requiring Follow-up:  - Requires Rheumatology and Nephrology follow up of biopsy results to guide treatment of nephrotic syndrome    Advanced Directives:   [x] Full code  [ ] DNR  [ ] Hospice    Discharge Diagnoses:  Lupus Nephritis  ?Pelvic Inflammatory Disease         HPI:  29 y.o. female with PHM HTN presenting with lower abdominal pain, fever and chills for the past 2 weeks. Patient states she was seen in [facility] ED on 11/5; was told she has a UTI, started on cefpodixime but pt is unsure if urine cx was sent. Patient was then seen at [facility] on 11/10/24 for similar sx- found to have elevated D Dimer, negative CTPE but ct a/p showed small amount of perisplenic fluid. Patient was recommended to f/u with GI outpatient. There was also concern for a possibly nephrology issue at that time due to protein >1000 in urine, patient was told to f/u outpatient with nephrology. Pt states she has a nephrology appointment on 11/19/24 but did not schedule the GI appointment as yet.     Today patient describes her abdominal pain as dull, cramps, waxing/waning, 3-4 /10 in severity, localized in lower abdomen b/l. No radiation. Patient states eating makes the pain worse. Pain is slightly alleviated with heating pad and tylenol. She states her pain has not worsened but she is concerned because her pain is still persistent for 2 weeks and has not resolved after finishing course of antibiotics. Patient endorses nausea and decr. po intake but denies vomiting, diarrhea, hematochezia, melena. Denies dysuria, flank pain, vaginal bleeding, vaginal discharge. Patient also endorses fevers (Tmax 101 F, 2 days ago), chills, generalized weakness. Patient states she is sexually active, states she does not regularly use protection. Denies hx. STI. Additionally patient states her BP has been elevated, at home it was 151/103. She restarted her metoprolol 100mg daily yesterday after seeing PCP, prior she had not been on any BP medication for months. Endorses SOB. Denies CP, syncopal episodes, lightheadedness.     (16 Nov 2024 00:46)    Hospital Course:  Patient admitted for further management of SIRS and abdominal pain. CT A/P redemonstrated loculated perisplenic fluid and multiple heterogenous uterine masses, likely representing fibroids, with questionable masslike hypoattenuation of the cervical region as well as right lower quadrant soft tissue nodules vs lymph node. Prior CT PE during last hospitalization showed perisplenic ascites and diffuse bilateral axillary lymphadenopathy. Dedicated TVUS showed uterine fibroids and 1.1 cm echogenic left ovarian lesion may represent a hemorrhagic cyst or corpus albicans. No signs of ovarian torsion. BHcG was -ve. Gyn was consulted at the ED, examine inconsistent with PID. Patient was started on empiric PID coverage with CTX/doxy/flagyl. Further infection workup including blood clx, urine clx, HIV, syphilis, EBV were negative. Pt had proteinuria with elevated protein/Cr ration and SILVESTRE, UA was positive for WBC & blood, and pt was edematous concerning for nephritis vs nephrotic syndrome. 24hr urine protein collection showed >3.5gm, diagnostic of nephrotic syndrome. Rheumatologic work up showed + MILAGROS 1:160, +dsDNA and +RNP consistent with new diagnosis of Lupus complicated by likely lupus nephritis. Per ID, antibiotics were continued targeting PID with plan for 14 day course due to resolution of fevers after starting abx. Last day of abx to be 11/29. Decision was made not to drain perisplenic collection after diagnosis of nephrotic syndrome was made, as the fluid likely represents 3rd spacing in the setting of hypoalbuminuria as opposed to source of infection. IR with plans to perform kidney biopsy on 11/22; however, cancelled due to poor blood pressure control. Patient requested to be discharged with plans to receive kidney biopsy outpatient and follow up with her rheumatologist and nephrologist upon DC.     On day of discharge, patient is clinically stable with no new exam findings or acute symptoms compared to prior. The patient was seen by the attending physician on the date of discharge and deemed stable and acceptable for discharge. The patient's chronic medical conditions were treated accordingly per the patient's home medication regimen. The patient's medication reconciliation, follow up appointments, discharge instructions, and significant lab and diagnostic studies are as noted.      Important Medication Changes and Reason:  START Doxycycline 100mg BID THROUGH 11/29  START Metronidazole 500mg BID THROUGH 11/29  START Losartan 25 qd    Active or Pending Issues Requiring Follow-up:  - Requires Rheumatology and Nephrology follow up of biopsy results to guide treatment of nephrotic syndrome    Advanced Directives:   [x] Full code  [ ] DNR  [ ] Hospice    Discharge Diagnoses:  Lupus Nephritis  ?Pelvic Inflammatory Disease

## 2024-11-17 NOTE — PROGRESS NOTE ADULT - PROBLEM SELECTOR PLAN 3
11/15 CT A/P LYMPH NODES: Right lower quadrant, anterior to the psoas muscle (301 -60), 1.7 x 1.3 cm nodule or lymph node. Additional nodule/node at the level of the aortic bifurcation (301-59), 1.7 x 1 cm  11/10: Mild bilateral axillary lymphadenopathy.   cervical lymphadenopathy on examine  painless lymphadenopathy    Plan:  - f/u workup as above

## 2024-11-17 NOTE — PROGRESS NOTE ADULT - PROBLEM SELECTOR PLAN 7
DVT PPx: SCDs   E: replete K<4, Mg<2  GI PPx: Not indicated  Diet: Regular, as tolerated  Code Status: Full  Dispo: pending medical improvement 11/10 proteinuria > 1000  11/15 proteinuria > 500  a1c 5.4; Pr/Cr 2, normal Cr  ddx transient proteinuria, orthostatic proteinuria vs GN     Plan:   - outpatient nephrology follow up has appointment for 11/19/24

## 2024-11-17 NOTE — DISCHARGE NOTE PROVIDER - NSDCMRMEDTOKEN_GEN_ALL_CORE_FT
metoprolol succinate 100 mg oral tablet, extended release: 1 tab(s) orally once a day  ondansetron 4 mg oral tablet, disintegratin tab(s) orally every 6 hours as needed for  nausea   doxycycline monohydrate 100 mg oral tablet: 1 tab(s) orally 2 times a day  metoprolol succinate 100 mg oral tablet, extended release: 1 tab(s) orally once a day  metroNIDAZOLE 500 mg oral tablet: 1 tab(s) orally 2 times a day Take first pill 11/22 Evening   doxycycline monohydrate 100 mg oral tablet: 1 tab(s) orally 2 times a day  losartan 25 mg oral tablet: 1 tab(s) orally once a day  metoprolol succinate 100 mg oral tablet, extended release: 1 tab(s) orally once a day  metroNIDAZOLE 500 mg oral tablet: 1 tab(s) orally 2 times a day Take first pill 11/22 Evening   Blood pressure Machine: Take blood pressure 3 times per day whlie sitting in a chair relaxed for at least 5 min, keep journal of these values  doxycycline monohydrate 100 mg oral tablet: 1 tab(s) orally 2 times a day  losartan 25 mg oral tablet: 1 tab(s) orally once a day  metoprolol succinate 100 mg oral tablet, extended release: 1 tab(s) orally once a day  metroNIDAZOLE 500 mg oral tablet: 1 tab(s) orally 2 times a day Take first pill 11/22 Evening

## 2024-11-17 NOTE — PROGRESS NOTE ADULT - ASSESSMENT
29F with PHM HTN presenting with lower abdominal pain, fever and chills for the past 2 weeks. evaluated by GYN at the ED, working ddx GYN vs  vs perisplenic ascites, Started on CTX/Doxy/Flagyl (11/15-).      Relevant hx:  - s/p Cefpodoxime from suspected UTI from [facility],   - represented to [facility] 11/10 for similar symptoms CT PE showed perisplenic ascites and b/l axillary lymphadenopathy

## 2024-11-17 NOTE — PROGRESS NOTE ADULT - ATTENDING COMMENTS
I have personally seen and examined patient on the above date. I discussed the case with resident and I agree with the findings and plan as detailed per note above, which I have amended where appropriate.    Ms. Garza is a 30 y/o F with PMH of HTN who presented to [facility] with ongoing worsening lower abd pain, fevers, chills for the past 2 weeks, previously seen in [facility] ED on 11/5/24, found with UTI, discharged on cefpodoxime, presented back to [facility] ED on 11/10 with similar sx and discharged from ED and now admitted due to persistent nature of the abdominal pain for further evaluation.     Pt seen and examined at bedside this morning. Reporting improved abd pain. Febrile to 100.5 as well. EBV serologies reviewed, d/w ID and report it is remote infection and to c/w current abx therapy. No cp, sob, fevers, chills, abd pain, melena/hematochezia. +BM, eating well. Labs and vitals reviewed.    #SIRS + on admission   - febrile to 100.9, Tachycardic to 100s  - vitals stable   - monitor fever curve    #Lower abd pain - ?PID vs ?ruptured ovarian cyst vs alt etiology   #Fibroids   - CT AP notable for: loculated perisplenic fluid along with L paracolic gutter, multiple heterogenous uterine masses likely fibroids, nonspecific soft tissue nodules/lymph nodes in RLQ and at the level of aortic bifurcation    - TVUS on 11/15 -> negative for torsion, fibroid uterus, 1.1cm echogenic L ovarian torsion may represent a hemorrhagic cyst or corpus albicans   - GYN consulted in ED, TVUS as above, rec tx for possible PID  - UA on 11/10 notable for: cloudy, orange urine, trace LE, +bacteria, WBC 17, +blood, CFU <10K. RVP negative.   - ID recs appreciated   - c/w CTX/Doxycycline/flagyl. EBV labs reviewed and per ID, suspect remote infection. syphilis screen negative   - F.u BCX = NGTD thus far   - may need prior urine culture results for further investigation   - HIV negative   - f/u GC chlyamdia, drug screen   - monitor fever curve  - tylenol prn     #HTN  - c/w home metoprolol for now, monitor vitals closely.     #SILVESTRE   - resolved   - monitor     #Proteinuria  - noted to have proteinuria previously, urine protein  - ?possible in relationship with the infection. Monitor for now as asx.     Rest of plan as above.

## 2024-11-17 NOTE — DISCHARGE NOTE PROVIDER - NSDCCPCAREPLAN_GEN_ALL_CORE_FT
PRINCIPAL DISCHARGE DIAGNOSIS  Diagnosis: Fever  Assessment and Plan of Treatment:       SECONDARY DISCHARGE DIAGNOSES  Diagnosis: Abdominal pain  Assessment and Plan of Treatment: Your abdominal pain likely was a result of your rupture cyst during ovulation as well as premenstrual syndrome.     PRINCIPAL DISCHARGE DIAGNOSIS  Diagnosis: Lupus nephritis  Assessment and Plan of Treatment: Your dark urine was from blood and protein in the urine. You also had a mild elevation in your kidney number, indicating injury or disease of the kidneys. Based on the results of your blood tests we think this is a form of kidney disease related to Lupus. You likely have the medical condition Systemic Lupus Erythematosus which can impact any organs in the body, and in your case affeected the kidneys. This can be treated with different medications but for now the Rheumatologist and Nephrologist will need the results of the biopsy first.  Please follow up with your Nephrologist and Rheumatologist within 2 weeks. The biopsy results should hopefully be back by 11/29-12/4.  Please return to the ER if you develop inability to urinate, significant darkening of the urine, become confused or very weak, your abdominal pain is uncontrolled with Tylenol, or you develop any other new or concerning symptoms.   AVOID ibuprofen, motrin, naproxen as this can injure the kidney.      SECONDARY DISCHARGE DIAGNOSES  Diagnosis: Fever  Assessment and Plan of Treatment: Your fever is likely from your Lupus, however, we did start you on antibiotics to taarget a wide variety of potential infection just in case. Our infeectious disease specialists recommended you continue on these antibiotics THROUGH 11/29.  Please follow with your Primary Care Doctor within 1 week. You can use Tylenol if you have fever.  MEDICATIONS:  Doxycycline 100mg tablet, 1 pill 2 times per day  Metronidazole 500mg tablet, 1 pill 2 times per day     PRINCIPAL DISCHARGE DIAGNOSIS  Diagnosis: Lupus nephritis  Assessment and Plan of Treatment: Your dark urine was from blood and protein in the urine. You also had a mild elevation in your kidney number, indicating injury or disease of the kidneys. Based on the results of your blood tests we think this is a form of kidney disease related to Lupus. You likely have the medical condition Systemic Lupus Erythematosus which can impact any organs in the body, and in your case affected the kidneys. This can be treated with different medications but for now the Rheumatologist and Nephrologist will need the results of the biopsy first.  Please follow up with your Nephrologist and Rheumatologist within 2 weeks.   Please return to the ER if you develop inability to urinate, significant darkening of the urine, become confused or very weak, your abdominal pain is uncontrolled with Tylenol, or you develop any other new or concerning symptoms.   AVOID ibuprofen, motrin, naproxen as this can injure the kidney.  Please take all medications exactly as prescribed. Please return to the ED if you experience any new or worsening symptoms.  Medication Changes:  1. Start Losartan  Follow Up:  1. Rheumatology for SLE management  2. Nephrology for lupus nephritis  3. Interventional radiology for kidney biopsy  4. PCP for general health maintenance      SECONDARY DISCHARGE DIAGNOSES  Diagnosis: Fever  Assessment and Plan of Treatment: Your fever is likely from your Lupus, however, we did start you on antibiotics to taarget a wide variety of potential infection just in case. Our infeectious disease specialists recommended you continue on these antibiotics THROUGH 11/29.  Please follow with your Primary Care Doctor within 1 week. You can use Tylenol if you have fever.  MEDICATIONS:  Doxycycline 100mg tablet, 1 pill 2 times per day  Metronidazole 500mg tablet, 1 pill 2 times per day

## 2024-11-17 NOTE — DISCHARGE NOTE PROVIDER - PROVIDER TOKENS
PROVIDER:[TOKEN:[[id number]],FOLLOWUP:[2 weeks]] PROVIDER:[TOKEN:[[id number]],FOLLOWUP:[1-3 days]],FREE:[LAST:[Jono],FIRST:[Ogu[place]ha],PHONE:[phone]],FAX:[(   )    -],FOLLOWUP:[1 week],ESTABLISHEDPATIENT:[T]],FREE:[LAST:[Gomberg],FIRST:[Giliana],PHONE:[(   )    -],FAX:[(   )    -],FOLLOWUP:[2 weeks],ESTABLISHEDPATIENT:[T]],FREE:[LAST:[Radiology],FIRST:[Interventional],PHONE:[phone]],FAX:[(   )    -],FOLLOWUP:[1 week],ESTABLISHEDPATIENT:[T]]

## 2024-11-17 NOTE — PROGRESS NOTE ADULT - PROBLEM SELECTOR PLAN 4
repeat UA this admission, likely contaminated.     Plan:  - abx as above  -  f/u urine cx. from this admission Hgb 8.6 <-9    Plan:  - f/u nutritional lab  - f/u hemolysis lab Hgb 8.6 <-9    Plan:  - f/u nutritional lab  - f/u hemolysis lab  -reports menstrual period about to come, not actively menstruating.

## 2024-11-17 NOTE — PROVIDER CONTACT NOTE (OTHER) - REASON
Oral temperature 100.9
Oral temp 100.5
Oral temperature 101.4, , /98
hR: 112
Temp: 100.9
temp 100.5
BP: 133/98
temp; 100.4

## 2024-11-17 NOTE — PROVIDER CONTACT NOTE (OTHER) - DATE AND TIME:
16-Nov-2024 06:00
16-Nov-2024 10:45
17-Nov-2024 11:16
16-Nov-2024 20:49
16-Nov-2024 11:50
17-Nov-2024 04:42
16-Nov-2024 19:10
17-Nov-2024 22:16

## 2024-11-17 NOTE — PROGRESS NOTE ADULT - PROBLEM SELECTOR PLAN 5
- c/w home metoprolol 100mg daily repeat UA this admission, likely contaminated.     Plan:  - abx as above  -  f/u urine cx. from this admission repeat UA this admission, likely contaminated.     Plan:  - abx as above  -  urine cx <10K CFU

## 2024-11-17 NOTE — PROVIDER CONTACT NOTE (OTHER) - RECOMMENDATIONS
MD notified
IV tylenol.
ice pack
ministerio mukherjee, sue harrison
MD notified
Notify MD
MD notified
gives morning metoprolol for the hR.
no

## 2024-11-17 NOTE — PROGRESS NOTE ADULT - SUBJECTIVE AND OBJECTIVE BOX
Progress Note    11-15-24 (2d)    Patient is a 29y old  Female who presents with a chief complaint of Abdominal Pain, Fever (16 Nov 2024 13:04)      Subjective / Overnight Events :  - No acute events overnight.  - Pt seen and examined at bedside.     Hospital Course:      Additional ROS (if any):    MEDICATIONS  (STANDING):  cefTRIAXone   IVPB 1000 milliGRAM(s) IV Intermittent every 24 hours  doxycycline IVPB 100 milliGRAM(s) IV Intermittent every 12 hours  enoxaparin Injectable 40 milliGRAM(s) SubCutaneous every 24 hours  influenza   Vaccine 0.5 milliLiter(s) IntraMuscular once  metoprolol succinate  milliGRAM(s) Oral daily  metroNIDAZOLE  IVPB 500 milliGRAM(s) IV Intermittent every 12 hours  metroNIDAZOLE  IVPB        MEDICATIONS  (PRN):  acetaminophen     Tablet .. 650 milliGRAM(s) Oral every 6 hours PRN Temp greater or equal to 38C (100.4F), Mild Pain (1 - 3)  aluminum hydroxide/magnesium hydroxide/simethicone Suspension 30 milliLiter(s) Oral every 4 hours PRN Dyspepsia  melatonin 3 milliGRAM(s) Oral at bedtime PRN Insomnia      CAPILLARY BLOOD GLUCOSE        I&O's Summary      PHYSICAL EXAM:  Vital Signs Last 24 Hrs  T(C): 37.4 (17 Nov 2024 04:40), Max: 38.6 (16 Nov 2024 10:46)  T(F): 99.3 (17 Nov 2024 04:40), Max: 101.4 (16 Nov 2024 10:46)  HR: 112 (17 Nov 2024 04:40) (95 - 112)  BP: 136/93 (17 Nov 2024 04:40) (131/91 - 147/102)  BP(mean): --  RR: 16 (17 Nov 2024 04:40) (16 - 18)  SpO2: 98% (17 Nov 2024 04:40) (98% - 100%)    Parameters below as of 17 Nov 2024 04:40  Patient On (Oxygen Delivery Method): room air        General: NAD, non-toxic appearing   HEENT: PERRLA, EOMi, no scleral icterus  CV: RRR, normal S1 and S2, no m/r/g  Lungs: normal respiratory effort. CTAB, no wheezes, rales, or rhonchi  Abd: soft, nontender, nondistended  Ext: no edema, 2+ peripheral pulses   Pysch: AAOx3, appropriate affect   Neuro: grossly non-focal, moving all extremities spontaneously   Skin: no rashes or lesions     LABS:                          8.6    3.97  )-----------( 181      ( 17 Nov 2024 06:53 )             26.2       WBC Trend: 3.97<--, 4.38<--, 5.38<--  Hb Trend: 8.6<--, 9.0<--, 9.5<--    11-16    139  |  106  |  11  ----------------------------<  99  3.8   |  22  |  1.21    Ca    8.2[L]      16 Nov 2024 07:23  Phos  3.9     11-16  Mg     1.80     11-16    TPro  6.9  /  Alb  2.9[L]  /  TBili  0.4  /  DBili  x   /  AST  31  /  ALT  14  /  AlkPhos  54  11-16          Urinalysis Basic - ( 16 Nov 2024 07:23 )    Color: x / Appearance: x / SG: x / pH: x  Gluc: 99 mg/dL / Ketone: x  / Bili: x / Urobili: x   Blood: x / Protein: x / Nitrite: x   Leuk Esterase: x / RBC: x / WBC x   Sq Epi: x / Non Sq Epi: x / Bacteria: x        Culture - Blood (collected 15 Nov 2024 21:45)  Source: .Blood BLOOD  Preliminary Report (17 Nov 2024 03:01):    No growth at 24 hours    Culture - Blood (collected 15 Nov 2024 21:30)  Source: .Blood BLOOD  Preliminary Report (17 Nov 2024 03:01):    No growth at 24 hours    Urinalysis with Rflx Culture (collected 15 Nov 2024 15:30)    Culture - Urine (collected 15 Nov 2024 15:30)  Source: Clean Catch  Final Report (16 Nov 2024 19:53):    <10,000 CFU/mL Normal Urogenital Charu          RADIOLOGY & ADDITIONAL TESTS: Reviewed Progress Note    11-15-24 (2d)    Patient is a 29y old  Female who presents with a chief complaint of Abdominal Pain, Fever (16 Nov 2024 13:04)      Subjective / Overnight Events :  - No acute events overnight.  - Pt seen and examined at bedside.     Hospital Course:      Additional ROS (if any):    MEDICATIONS  (STANDING):  cefTRIAXone   IVPB 1000 milliGRAM(s) IV Intermittent every 24 hours  doxycycline IVPB 100 milliGRAM(s) IV Intermittent every 12 hours  enoxaparin Injectable 40 milliGRAM(s) SubCutaneous every 24 hours  influenza   Vaccine 0.5 milliLiter(s) IntraMuscular once  metoprolol succinate  milliGRAM(s) Oral daily  metroNIDAZOLE  IVPB 500 milliGRAM(s) IV Intermittent every 12 hours  metroNIDAZOLE  IVPB        MEDICATIONS  (PRN):  acetaminophen     Tablet .. 650 milliGRAM(s) Oral every 6 hours PRN Temp greater or equal to 38C (100.4F), Mild Pain (1 - 3)  aluminum hydroxide/magnesium hydroxide/simethicone Suspension 30 milliLiter(s) Oral every 4 hours PRN Dyspepsia  melatonin 3 milliGRAM(s) Oral at bedtime PRN Insomnia      CAPILLARY BLOOD GLUCOSE        I&O's Summary      PHYSICAL EXAM:  Vital Signs Last 24 Hrs  T(C): 37.4 (17 Nov 2024 04:40), Max: 38.6 (16 Nov 2024 10:46)  T(F): 99.3 (17 Nov 2024 04:40), Max: 101.4 (16 Nov 2024 10:46)  HR: 112 (17 Nov 2024 04:40) (95 - 112)  BP: 136/93 (17 Nov 2024 04:40) (131/91 - 147/102)  BP(mean): --  RR: 16 (17 Nov 2024 04:40) (16 - 18)  SpO2: 98% (17 Nov 2024 04:40) (98% - 100%)    Parameters below as of 17 Nov 2024 04:40  Patient On (Oxygen Delivery Method): room air        General: NAD, non-toxic appearing, on ra  HEENT: PERRLA, EOMi, no scleral icterus, no lad   CV: RRR, normal S1 and S2, no m/r/g  Lungs: normal respiratory effort. CTAB, no wheezes, rales, or rhonchi  Abd: soft, nontender, nondistended, no bladder pain/tenderness  Ext: no edema, 2+ peripheral pulses   Pysch: AAOx3, appropriate affect   Neuro: grossly non-focal, moving all extremities spontaneously   Skin: no rashes or lesions     LABS:                          8.6    3.97  )-----------( 181      ( 17 Nov 2024 06:53 )             26.2       WBC Trend: 3.97<--, 4.38<--, 5.38<--  Hb Trend: 8.6<--, 9.0<--, 9.5<--    11-16    139  |  106  |  11  ----------------------------<  99  3.8   |  22  |  1.21    Ca    8.2[L]      16 Nov 2024 07:23  Phos  3.9     11-16  Mg     1.80     11-16    TPro  6.9  /  Alb  2.9[L]  /  TBili  0.4  /  DBili  x   /  AST  31  /  ALT  14  /  AlkPhos  54  11-16          Urinalysis Basic - ( 16 Nov 2024 07:23 )    Color: x / Appearance: x / SG: x / pH: x  Gluc: 99 mg/dL / Ketone: x  / Bili: x / Urobili: x   Blood: x / Protein: x / Nitrite: x   Leuk Esterase: x / RBC: x / WBC x   Sq Epi: x / Non Sq Epi: x / Bacteria: x        Culture - Blood (collected 15 Nov 2024 21:45)  Source: .Blood BLOOD  Preliminary Report (17 Nov 2024 03:01):    No growth at 24 hours    Culture - Blood (collected 15 Nov 2024 21:30)  Source: .Blood BLOOD  Preliminary Report (17 Nov 2024 03:01):    No growth at 24 hours    Urinalysis with Rflx Culture (collected 15 Nov 2024 15:30)    Culture - Urine (collected 15 Nov 2024 15:30)  Source: Clean Catch  Final Report (16 Nov 2024 19:53):    <10,000 CFU/mL Normal Urogenital Charu          RADIOLOGY & ADDITIONAL TESTS: Reviewed

## 2024-11-17 NOTE — PROGRESS NOTE ADULT - SUBJECTIVE AND OBJECTIVE BOX
Follow Up:      Interval History/ROS:  low grade fever 100   had brief episode of lower abd pain during night     Allergies  No Known Allergies        ANTIMICROBIALS:  cefTRIAXone   IVPB 1000 every 24 hours  doxycycline IVPB 100 every 12 hours  metroNIDAZOLE  IVPB 500 every 12 hours  metroNIDAZOLE  IVPB        OTHER MEDS:  acetaminophen     Tablet .. 650 milliGRAM(s) Oral every 6 hours PRN  aluminum hydroxide/magnesium hydroxide/simethicone Suspension 30 milliLiter(s) Oral every 4 hours PRN  enoxaparin Injectable 40 milliGRAM(s) SubCutaneous every 24 hours  fluticasone propionate 50 MICROgram(s)/spray Nasal Spray 2 Spray(s) Both Nostrils two times a day  influenza   Vaccine 0.5 milliLiter(s) IntraMuscular once  melatonin 3 milliGRAM(s) Oral at bedtime PRN  metoprolol succinate  milliGRAM(s) Oral daily      Vital Signs Last 24 Hrs  T(C): 37.6 (17 Nov 2024 12:09), Max: 38.1 (16 Nov 2024 19:00)  T(F): 99.7 (17 Nov 2024 12:09), Max: 100.5 (16 Nov 2024 19:00)  HR: 112 (17 Nov 2024 04:40) (95 - 112)  BP: 136/93 (17 Nov 2024 04:40) (131/91 - 147/102)  BP(mean): --  RR: 16 (17 Nov 2024 04:40) (16 - 18)  SpO2: 98% (17 Nov 2024 04:40) (98% - 100%)    Parameters below as of 17 Nov 2024 04:40  Patient On (Oxygen Delivery Method): room air        PHYSICAL EXAM:  Constitutional: Not in acute distress  Eyes: No icterus.  Oral cavity: Clear, no lesions  Neck: Supple  RS: Chest clear   CVS: no respiratory distress RA  Abdomen: Soft. No guarding/rigidity/tenderness.  : no pearl  Skin: No lesions noted  Neuro: Alert, oriented to time/place/person  Cranial nerves 2-12 grossly normal. No focal abnormalities                          8.6    3.97  )-----------( 181      ( 17 Nov 2024 06:53 )             26.2       11-17    139  |  105  |  10  ----------------------------<  101[H]  4.0   |  21[L]  |  1.14    Ca    8.2[L]      17 Nov 2024 06:53  Phos  3.3     11-17  Mg     1.60     11-17    TPro  6.9  /  Alb  2.8[L]  /  TBili  0.4  /  DBili  x   /  AST  26  /  ALT  8   /  AlkPhos  52  11-17      urinUrine Microscopic-Add On (NC) (11.15.24 @ 15:30)   Review: Reviewed  Cast: 28 /LPF  White Blood Cell - Urine: 15 /HPF  Red Blood Cell - Urine: 126 /HPF  Bacteria: Occasional /HPF  Epithelial Cells: 14 /HPF        MICROBIOLOGY:  v  .Blood BLOOD  11-15-24   No growth at 24 hours  --  --      .Blood BLOOD  11-15-24   No growth at 24 hours  --  --      Clean Catch  11-15-24   <10,000 CFU/mL Normal Urogenital Charu  --  --      Clean Catch  11-10-24   <10,000 CFU/mL Normal Urogenital Charu  --  --          Rapid RVP Result: NotDetec (11-15 @ 21:51)        RADIOLOGY:    d< from: US Transvaginal (11.15.24 @ 21:29) >    ACC: [id number] EXAM:  US TRANSVAGINAL   ORDERED BY: ABDIAS DAVIS     PROCEDURE DATE:  11/15/2024          INTERPRETATION:  CLINICAL INFORMATION: Lower abdominal pain. Negative   beta hCG.    LMP: 10/20/2024.    COMPARISON: CT abdomen and pelvis 11/15/2024.    TECHNIQUE:  Endovaginal and transabdominal pelvic sonogram. Color and Spectral   Doppler was performed.    FINDINGS:  Uterus: 10.0 cm x 5.5 cm x 5.9 cm. Leiomyomatous uterus. For reference:   An anterior subserosal fibroid measuring 2.9 x2.9 x 2.8 cm, the lower   uterine segment fibroid measuring 3.6 x 2.9 x 2.8 cm, a pedunculated   subserosal fibroid measuring 2.1 x 2.2 x 2.2 cm.  Endometrium: 7 mm. Within normal limits.    Right ovary: 5.6 cm x 2.8 cm x 2.4 cm. Echogenic lesion measuring 1.0 x   1.1 x 1.0 cm. Normal arterial and venous waveforms.  Left ovary: 2.7 cm x 1.5 cm x 2.2 cm. Within normal limits. Normal   arterial and venous waveforms.    Fluid: Trace fluid.    IMPRESSION:  No sonographic evidence of ovarian torsion.    Fibroid uterus.    A 1.1 cm echogenic left ovarian lesion may represent a hemorrhagic cyst   or corpus albicans.    --- End of Report ---      < end of copied text >  < from: CT Abdomen and Pelvis w/ IV Cont (11.15.24 @ 18:07) >    ACC: [id number] EXAM:  CT ABDOMEN AND PELVIS IC   ORDERED BY: ABDIAS DAVIS     PROCEDURE DATE:  11/15/2024          INTERPRETATION:  CLINICAL INFORMATION: Abdominal pain, Fever and chills   for 2 weeks.    COMPARISON: Abdominal radiograph 10/27/2021. Abdominal ultrasound   3/27/2011. CT chest 11/10/2024.    CONTRAST/COMPLICATIONS:  IV Contrast: Omnipaque 350  90 cc administered   10 cc discarded  Oral Contrast: NONE      PROCEDURE:  CT of the Abdomen and Pelvis was performed.  Sagittal and coronal reformats were performed.    FINDINGS:  LOWER CHEST: Within normal limits.    LIVER: Within normal limits.  BILE DUCTS: Normal caliber.  GALLBLADDER: Within normal limits.  SPLEEN: Partially loculated perisplenic fluid tracking along the left   paracolic gutter.  PANCREAS: Within normal limits.  ADRENALS: Within normal limits.  KIDNEYS/URETERS: Within normal limits.    BLADDER: Within normal limits.  REPRODUCTIVE ORGANS: Multiple heterogeneous uterine masses likely   representing fibroids. Questionable masslike hypoattenuation of the   cervical region, which may represent normal appearance of the cervix   versus an underlying mass.    BOWEL: No bowel obstruction. Appendix is normal.  PERITONEUM/RETROPERITONEUM: Small volume perisplenic and pelvic ascites.  VESSELS: Within normal limits.  LYMPH NODES: Right lower quadrant, anterior to the psoas muscle (301   -60), 1.7 x 1.3 cm nodule or lymph node. Additional nodule/node at the   level of the aortic bifurcation (301-59), 1.7 x 1 cm.  ABDOMINAL WALL: Tiny fat-containing right inguinal hernia.  BONES: Within normal limits.    IMPRESSION:  Partially loculated perisplenic fluid tracking along the left paracolic   gutter.    Multiple heterogeneous uterine masses likely representing fibroids.   Questionable masslike hypoattenuation of the cervical region, which may   represent normal appearance of the cervix, however an underlying mass   cannot be excluded. Recommend further evaluation with pelvic ultrasound   or nonemergent contrast-enhanced pelvic MRI.    Nonspecific soft tissue nodules or lymph nodes in the right lower   quadrant and at the level of the aortic bifurcation.        --- End of Report ---      < end of copied text >

## 2024-11-17 NOTE — DISCHARGE NOTE PROVIDER - NSFOLLOWUPCLINICS_GEN_ALL_ED_FT
[facility]  Nephrology  [address] [place] [address], [address] [place] [address]  Phone: [phone]  Fax:   Follow Up Time: 1 week

## 2024-11-17 NOTE — PROVIDER CONTACT NOTE (OTHER) - NAME OF MD/NP/PA/DO NOTIFIED:
MD, Michael Mahoney
md, rhiannon Rosa
md, rhiannon Rosa
Elsa Ruelas
Elsa Ruelas
Vivian Rubalcava
Elsa Ruelas
md, sue harrison

## 2024-11-17 NOTE — PROVIDER CONTACT NOTE (OTHER) - ASSESSMENT
Patient is A&O 4, no signs of distress noted
Oral temp 100.5    /102
BP: 133/98   Patient in no acute distress
Oral temperature 101.4, , /98  RR 17  SPO2 100%
Patient is A&O times 4. no acute stress noted. patient reported feeling hot. temp; 100.4 after oral Tylenol.  vS: temp; 100.4, HR: 107, RR: 17, BP: 138/91, RR: 17, and SpO2: 100%. BC and UC were collected on 11/15
Patient is A&O times 4. no acute stress noted. patient denied chest discomfort or chest pain. patient was sleeping.  vS: temp; 99.3, HR: 112,, RR: 16, BP: 136/93, RR: 16, and SpO2: 98%.
Patient is A&O times 4. patient reported about feeling nausea. patient denied chest discomfort or chest pain. patient feels chills.  vS: temp; 100.9, HR: 93,, RR: 19, BP: 170/114, RR: 19, and SpO2: 95%. The blood cultures were collected 11/15.
Patient oral temperature 100.9 after 30 minute IV Tylenol reassessment

## 2024-11-17 NOTE — PROGRESS NOTE ADULT - PROBLEM SELECTOR PLAN 1
normal wbc clinically unknown source: GYN vs  vs perisplenic loculated ascites  UTI s/p cefpodoxime 200mg BID treatment at home for UTI, last dose today,   11/10 [facility] Urine culture showed normal urogenital oanh  11/10 CT/PE bl axillary lymphadenopathy   11/15 CT abdomen reveals partially loculated perisplenic fluid. Multiple uterine masses likely representing fibroids. Nonspecific soft tissue nodules or lymph nodes in RLQ and level of aortic bifurfication.  11/17 -ve HIV, EBV, syphilis, blood clx NGTD, uclx wnl    Plan:  - f/u Chlamydia/GC/Trich  - empirical PID coverage/: CTX, Doxy 100mg BID 14d, flagyl 500mg BID 14d if no other source per GYN.   - ID consulted; further abx adjustment pending further ID evaluation normal wbc clinically unknown source: GYN vs  vs perisplenic loculated ascites  UTI s/p cefpodoxime 200mg BID treatment at home for UTI, last dose today,   11/10 [facility] Urine culture showed normal urogenital oanh  11/10 CT/PE bl axillary lymphadenopathy   11/15 CT abdomen reveals partially loculated perisplenic fluid. Multiple uterine masses likely representing fibroids. Nonspecific soft tissue nodules or lymph nodes in RLQ and level of aortic bifurfication.  11/17 -ve HIV, EBV, syphilis, blood clx NGTD, uclx wnl    Plan:  - f/u Chlamydia/GC/Trich  - empirical PID coverage/: CTX, Doxy 100mg BID 14d, flagyl 500mg BID 14d if no other source per GYN.   - ID recs appreciated; rec c/w current regimen. suspect +EBV, remote infection (not acute).

## 2024-11-17 NOTE — PROGRESS NOTE ADULT - ASSESSMENT
29 y.o. female with PHM HTN presenting with lower abdominal pain, fever and chills for the past 2 weeks    Assessment:  #Fever  #Abdominal pain  -UTI s/p cefpodoxime 200mg BID treatment at home for UTI  -Pt febrile in the ED, normal WBC  -UA+, BCX pending  -HIV neg   -CT A/P Partially loculated perisplenic fluid tracking along the left paracolic gutter.  Multiple heterogeneous uterine masses likely representing fibroids. Questionable masslike hypoattenuation of the cervical region, which may represent normal appearance of the cervix, however an underlying mass cannot be excluded. Recommend further evaluation with pelvic ultrasound or nonemergent contrast-enhanced pelvic MRI. Nonspecific soft tissue nodules or lymph nodes in the right lower   quadrant and at the level of the aortic bifurcation.  -Transvaginal US: No sonographic evidence of ovarian torsion. Fibroid uterus. A 1.1 cm echogenic left ovarian lesion may represent a hemorrhagic cyst or corpus albicans.      -fever likely 2/2  source   - EBV serology consistent with remote infection not acute     Suggest;  -Cont Ceftriaxone, flagyl and doxy   -f/u blood cultures, urine culture,  Chlamydia/GC/Trich

## 2024-11-17 NOTE — PROGRESS NOTE ADULT - PROBLEM SELECTOR PLAN 6
11/10 proteinuria > 1000  11/15 proteinuria > 500  a1c 5.4; Pr/Cr 2, normal Cr  ddx transient proteinuria, orthostatic proteinuria vs GN     Plan:   - outpatient nephrology follow up has appointment for 11/19/24 - c/w home metoprolol 100mg daily

## 2024-11-17 NOTE — DISCHARGE NOTE PROVIDER - CARE PROVIDERS DIRECT ADDRESSES
[email or web address]_Unknown,DirectAddress_Unknown,DirectAddress_Unknown

## 2024-11-17 NOTE — DISCHARGE NOTE PROVIDER - CARE PROVIDER_API CALL
Wayne Nagy  Rheumatology  [address] [place] [address]  Phone: [phone]  Fax: [phone]  Follow Up Time: 2 weeks   Wayne Nagy  Rheumatology  [address] [place] [id number]  Phone: [phone]  Fax: [phone]  Follow Up Time: 1-3 days    Sherri De La Cruz  Phone: [phone]  Fax: (   )    -  Established Patient  Follow Up Time: 1 week    Gianna Paula  Phone: (   )    -  Fax: (   )    -  Established Patient  Follow Up Time: 2 weeks    Radiology, Interventional  Phone: [phone]  Fax: (   )    -  Established Patient  Follow Up Time: 1 week

## 2024-11-17 NOTE — DISCHARGE NOTE PROVIDER - NSDCFUADDAPPT_GEN_ALL_CORE_FT
APPTS ARE READY TO BE MADE: [ ] YES    Best Family or Patient Contact (if needed):    Additional Information about above appointments (if needed):    1:   2:   3:     Other comments or requests:    Please follow up with your Nephrologist Dr. Gianna Paula within 2 weeks APPTS ARE READY TO BE MADE: [X ] YES    Best Family or Patient Contact (if needed):    Additional Information about above appointments (if needed):    1: Rheumatology (Dr. Wayne Nagy)  2: Nephrology (Dr. Gianna Paula)  3: Interventional Radiology for kidney biopsy  4. PCP for general health maintenance    Other comments or requests:      APPTS ARE READY TO BE MADE: [X ] YES    Best Family or Patient Contact (if needed):    Additional Information about above appointments (if needed):    1: Rheumatology (Dr. Wayne Nagy)  2: Nephrology (Dr. Gianna Paula)  3: Interventional Radiology for kidney biopsy  4. PCP for general health maintenance    Other comments or requests:   Patient informed us they already have secured a follow up appointment which is not visible on [facility] and declined to provide appointment details. Patient advised she already has a nephrology and rheumatology appointment scheduled    Patient advised they did not want to proceed with scheduling appointments with the providers on their referrals. They will coordinate care on their own. Patient took down our number if future help is needed but advised her providers are already assisting with scheduling.

## 2024-11-17 NOTE — PROGRESS NOTE ADULT - PROBLEM SELECTOR PLAN 2
Differential includes GYN etiology ruptured ovarian cyst vs PMD/fibroid vs. endometriosis vs infectious causes PID vs  pyelonephritis  BHCG -ve  11/15 CT abdomen reveals partially loculated perisplenic fluid. Multiple uterine masses likely representing fibroids. Nonspecific soft tissue nodules or lymph nodes in RLQ and level of aortic bifurfication.  Patient was seen by Ob/gyn in ED, low clinical suspicion for PID at this time given no CMT.  11/15 TVUS -ve for torsion; > 1.1 cm echogenic left ovarian lesion may represent a hemorrhagic cyst or corpus albicans. Fibroid uterus.      Plan  - workup as above  - PO or IV pain meds for relief  - Gyn consulted on admission, no acute intervention Differential includes GYN etiology ruptured ovarian cyst vs PMD/fibroid vs. endometriosis vs infectious causes PID vs  pyelonephritis  BHCG -ve  11/15 CT abdomen reveals partially loculated perisplenic fluid. Multiple uterine masses likely representing fibroids. Nonspecific soft tissue nodules or lymph nodes in RLQ and level of aortic bifurfication.  Patient was seen by Ob/gyn in ED, low clinical suspicion for PID at this time given no CMT.  11/15 TVUS -ve for torsion; > 1.1 cm echogenic left ovarian lesion may represent a hemorrhagic cyst or corpus albicans. Fibroid uterus.      Plan   - resolved at present  - workup as above  - Gyn consulted on admission, no acute intervention

## 2024-11-17 NOTE — PROVIDER CONTACT NOTE (OTHER) - BACKGROUND
patient admitted for fever 2nd due to UTI, with pmh of proteinuria, HTN, and bilateral lower abdominal pain.
Admitted with abdominal pain and fever.
patient admitted for fever 2nd due to UTI, with pmh of proteinuria, HTN, and bilateral lower abdominal pain.
Pt 30 y/o female tx for suspected UTI/PID
Pt is 30 y/o female admitted for fever of unknown origin; suspected UTI/PID
Pt is 30 y/o female admitted for fever of unknown origin; suspected UTI/PID  30 minute reassessment as per IV tylenol administration protocol showed improvement in temperature, however pt still febril
patient admitted for fever 2nd due to UTI, with pmh of proteinuria, HTN, and bilateral lower abdominal pain.
patient admitted for fever 2nd due to UTI, with pmh of proteinuria, HTN, and bilateral lower abdominal pain.

## 2024-11-17 NOTE — DISCHARGE NOTE PROVIDER - NSDCCPTREATMENT_GEN_ALL_CORE_FT
PRINCIPAL PROCEDURE  Procedure: CT abdomen pelvis wo/w con  Findings and Treatment: IMPRESSION:  Partially loculated perisplenic fluid tracking along the left paracolic   gutter.  Multiple heterogeneous uterine masses likely representing fibroids.   Questionable masslike hypoattenuation of the cervical region, which may   represent normal appearance of the cervix, however an underlying mass   cannot be excluded. Recommend further evaluation with pelvic ultrasound   or nonemergent contrast-enhanced pelvic MRI.  Nonspecific soft tissue nodules or lymph nodes in the right lower   quadrant and at the level of the aortic bifurcation.        SECONDARY PROCEDURE  Procedure: US transvaginal  Findings and Treatment:   IMPRESSION:  No sonographic evidence of ovarian torsion.  Fibroid uterus.  A 1.1 cm echogenic left ovarian lesion may represent a hemorrhagic cyst   or corpus albicans.

## 2024-11-18 LAB
ALBUMIN SERPL ELPH-MCNC: 2.7 G/DL — LOW (ref 3.3–5)
ALP SERPL-CCNC: 49 U/L — SIGNIFICANT CHANGE UP (ref 40–120)
ALT FLD-CCNC: 12 U/L — SIGNIFICANT CHANGE UP (ref 4–33)
ANION GAP SERPL CALC-SCNC: 11 MMOL/L — SIGNIFICANT CHANGE UP (ref 7–14)
AST SERPL-CCNC: 25 U/L — SIGNIFICANT CHANGE UP (ref 4–32)
BASOPHILS # BLD AUTO: 0.02 K/UL — SIGNIFICANT CHANGE UP (ref 0–0.2)
BASOPHILS NFR BLD AUTO: 0.6 % — SIGNIFICANT CHANGE UP (ref 0–2)
BILIRUB SERPL-MCNC: 0.3 MG/DL — SIGNIFICANT CHANGE UP (ref 0.2–1.2)
BUN SERPL-MCNC: 11 MG/DL — SIGNIFICANT CHANGE UP (ref 7–23)
CALCIUM SERPL-MCNC: 8.3 MG/DL — LOW (ref 8.4–10.5)
CHLORIDE SERPL-SCNC: 105 MMOL/L — SIGNIFICANT CHANGE UP (ref 98–107)
CO2 SERPL-SCNC: 22 MMOL/L — SIGNIFICANT CHANGE UP (ref 22–31)
CREAT SERPL-MCNC: 1.2 MG/DL — SIGNIFICANT CHANGE UP (ref 0.5–1.3)
EGFR: 62 ML/MIN/1.73M2 — SIGNIFICANT CHANGE UP
EOSINOPHIL # BLD AUTO: 0.07 K/UL — SIGNIFICANT CHANGE UP (ref 0–0.5)
EOSINOPHIL NFR BLD AUTO: 2.1 % — SIGNIFICANT CHANGE UP (ref 0–6)
FERRITIN SERPL-MCNC: 688 NG/ML — HIGH (ref 15–150)
FOLATE SERPL-MCNC: 4.1 NG/ML — SIGNIFICANT CHANGE UP (ref 3.1–17.5)
GLUCOSE SERPL-MCNC: 102 MG/DL — HIGH (ref 70–99)
HAPTOGLOB SERPL-MCNC: 69 MG/DL — SIGNIFICANT CHANGE UP (ref 34–200)
HCT VFR BLD CALC: 25.8 % — LOW (ref 34.5–45)
HGB BLD-MCNC: 8.4 G/DL — LOW (ref 11.5–15.5)
IANC: 1.77 K/UL — LOW (ref 1.8–7.4)
IMM GRANULOCYTES NFR BLD AUTO: 0.6 % — SIGNIFICANT CHANGE UP (ref 0–0.9)
IRON SATN MFR SERPL: 34 % — SIGNIFICANT CHANGE UP (ref 14–50)
IRON SATN MFR SERPL: 47 UG/DL — SIGNIFICANT CHANGE UP (ref 30–160)
LDH SERPL L TO P-CCNC: 309 U/L — HIGH (ref 135–225)
LYMPHOCYTES # BLD AUTO: 1.07 K/UL — SIGNIFICANT CHANGE UP (ref 1–3.3)
LYMPHOCYTES # BLD AUTO: 32.8 % — SIGNIFICANT CHANGE UP (ref 13–44)
MAGNESIUM SERPL-MCNC: 1.7 MG/DL — SIGNIFICANT CHANGE UP (ref 1.6–2.6)
MCHC RBC-ENTMCNC: 31.6 PG — SIGNIFICANT CHANGE UP (ref 27–34)
MCHC RBC-ENTMCNC: 32.6 G/DL — SIGNIFICANT CHANGE UP (ref 32–36)
MCV RBC AUTO: 97 FL — SIGNIFICANT CHANGE UP (ref 80–100)
MONOCYTES # BLD AUTO: 0.31 K/UL — SIGNIFICANT CHANGE UP (ref 0–0.9)
MONOCYTES NFR BLD AUTO: 9.5 % — SIGNIFICANT CHANGE UP (ref 2–14)
N GONORRHOEA RRNA SPEC QL NAA+PROBE: SIGNIFICANT CHANGE UP
NEUTROPHILS # BLD AUTO: 1.77 K/UL — LOW (ref 1.8–7.4)
NEUTROPHILS NFR BLD AUTO: 54.4 % — SIGNIFICANT CHANGE UP (ref 43–77)
NRBC # BLD: 0 /100 WBCS — SIGNIFICANT CHANGE UP (ref 0–0)
NRBC # FLD: 0 K/UL — SIGNIFICANT CHANGE UP (ref 0–0)
PHOSPHATE SERPL-MCNC: 3.8 MG/DL — SIGNIFICANT CHANGE UP (ref 2.5–4.5)
PLATELET # BLD AUTO: 172 K/UL — SIGNIFICANT CHANGE UP (ref 150–400)
POTASSIUM SERPL-MCNC: 4 MMOL/L — SIGNIFICANT CHANGE UP (ref 3.5–5.3)
POTASSIUM SERPL-SCNC: 4 MMOL/L — SIGNIFICANT CHANGE UP (ref 3.5–5.3)
PROT SERPL-MCNC: 6.8 G/DL — SIGNIFICANT CHANGE UP (ref 6–8.3)
RBC # BLD: 2.66 M/UL — LOW (ref 3.8–5.2)
RBC # BLD: 2.66 M/UL — LOW (ref 3.8–5.2)
RBC # FLD: 13 % — SIGNIFICANT CHANGE UP (ref 10.3–14.5)
RETICS #: 42.8 K/UL — SIGNIFICANT CHANGE UP (ref 25–125)
RETICS/RBC NFR: 1.6 % — SIGNIFICANT CHANGE UP (ref 0.5–2.5)
SODIUM SERPL-SCNC: 138 MMOL/L — SIGNIFICANT CHANGE UP (ref 135–145)
SPECIMEN SOURCE: SIGNIFICANT CHANGE UP
SPECIMEN SOURCE: SIGNIFICANT CHANGE UP
T VAGINALIS RRNA SPEC QL NAA+PROBE: SIGNIFICANT CHANGE UP
TIBC SERPL-MCNC: 139 UG/DL — LOW (ref 220–430)
UIBC SERPL-MCNC: 92 UG/DL — LOW (ref 110–370)
VIT B12 SERPL-MCNC: 338 PG/ML — SIGNIFICANT CHANGE UP (ref 200–900)
WBC # BLD: 3.26 K/UL — LOW (ref 3.8–10.5)
WBC # FLD AUTO: 3.26 K/UL — LOW (ref 3.8–10.5)

## 2024-11-18 PROCEDURE — 99232 SBSQ HOSP IP/OBS MODERATE 35: CPT

## 2024-11-18 PROCEDURE — 76705 ECHO EXAM OF ABDOMEN: CPT | Mod: 26

## 2024-11-18 RX ORDER — ONDANSETRON HYDROCHLORIDE 4 MG/1
4 TABLET, FILM COATED ORAL ONCE
Refills: 0 | Status: COMPLETED | OUTPATIENT
Start: 2024-11-18 | End: 2024-11-18

## 2024-11-18 RX ADMIN — METRONIDAZOLE 100 MILLIGRAM(S): 500 TABLET ORAL at 18:26

## 2024-11-18 RX ADMIN — DOXYCYCLINE HYCLATE 100 MILLIGRAM(S): 150 TABLET, COATED ORAL at 18:29

## 2024-11-18 RX ADMIN — ENOXAPARIN SODIUM 40 MILLIGRAM(S): 30 INJECTION SUBCUTANEOUS at 21:36

## 2024-11-18 RX ADMIN — ONDANSETRON HYDROCHLORIDE 4 MILLIGRAM(S): 4 TABLET, FILM COATED ORAL at 05:28

## 2024-11-18 RX ADMIN — Medication 100 MILLIGRAM(S): at 05:29

## 2024-11-18 RX ADMIN — METOPROLOL TARTRATE 100 MILLIGRAM(S): 100 TABLET, FILM COATED ORAL at 05:29

## 2024-11-18 RX ADMIN — METRONIDAZOLE 100 MILLIGRAM(S): 500 TABLET ORAL at 05:01

## 2024-11-18 RX ADMIN — DOXYCYCLINE HYCLATE 100 MILLIGRAM(S): 150 TABLET, COATED ORAL at 05:29

## 2024-11-18 RX ADMIN — ONDANSETRON HYDROCHLORIDE 4 MILLIGRAM(S): 4 TABLET, FILM COATED ORAL at 18:22

## 2024-11-18 NOTE — PROGRESS NOTE ADULT - PROBLEM SELECTOR PLAN 2
Differential includes GYN etiology ruptured ovarian cyst vs PMD/fibroid vs. endometriosis vs infectious causes PID vs  pyelonephritis  BHCG -ve  11/15 CT abdomen reveals partially loculated perisplenic fluid. Multiple uterine masses likely representing fibroids. Nonspecific soft tissue nodules or lymph nodes in RLQ and level of aortic bifurfication.  Patient was seen by Ob/gyn in ED, low clinical suspicion for PID at this time given no CMT.  11/15 TVUS -ve for torsion; > 1.1 cm echogenic left ovarian lesion may represent a hemorrhagic cyst or corpus albicans. Fibroid uterus.      Plan   - resolved at present  - workup as above  - Gyn consulted on admission, no acute intervention

## 2024-11-18 NOTE — PROGRESS NOTE ADULT - SUBJECTIVE AND OBJECTIVE BOX
Follow Up:      Interval History/ROS:  low grade fever 100.9 last night   IR evaluating for aspiration     Allergies  No Known Allergies        ANTIMICROBIALS:  cefTRIAXone   IVPB 1000 every 24 hours  doxycycline IVPB 100 every 12 hours  metroNIDAZOLE  IVPB 500 every 12 hours  metroNIDAZOLE  IVPB          OTHER MEDS:  acetaminophen     Tablet .. 650 milliGRAM(s) Oral every 6 hours PRN  aluminum hydroxide/magnesium hydroxide/simethicone Suspension 30 milliLiter(s) Oral every 4 hours PRN  enoxaparin Injectable 40 milliGRAM(s) SubCutaneous every 24 hours  fluticasone propionate 50 MICROgram(s)/spray Nasal Spray 2 Spray(s) Both Nostrils two times a day  influenza   Vaccine 0.5 milliLiter(s) IntraMuscular once  melatonin 3 milliGRAM(s) Oral at bedtime PRN  metoprolol succinate  milliGRAM(s) Oral daily      Vital Signs Last 24 Hrs  T(F): 98.7 (11-18-24 @ 12:20), Max: 100.9 (11-17-24 @ 20:52)  HR: 97 (11-18-24 @ 12:20)  BP: 139/99 (11-18-24 @ 12:20)  RR: 18 (11-18-24 @ 12:20)  SpO2: 100% (11-18-24 @ 12:20) (95% - 100%)        PHYSICAL EXAM:  Constitutional: Not in acute distress  Eyes: No icterus.  Oral cavity: Clear, no lesions  Neck: Supple  RS: Chest clear   CVS: no respiratory distress RA  Abdomen: Soft. No guarding/rigidity/tenderness.  : no pearl  Skin: No lesions noted  Neuro: Alert, oriented to time/place/person  Cranial nerves 2-12 grossly normal. No focal abnormalities                          8.4    3.26  )-----------( 172      ( 18 Nov 2024 06:20 )             25.8 11-18    138  |  105  |  11  ----------------------------<  102  4.0   |  22  |  1.20  Ca    8.3      18 Nov 2024 06:20Phos  3.8     11-18Mg     1.70     11-18  TPro  6.8  /  Alb  2.7  /  TBili  0.3  /  DBili  x   /  AST  25  /  ALT  12  /  AlkPhos  49  11-18      urinUrine Microscopic-Add On (NC) (11.15.24 @ 15:30)   Review: Reviewed  Cast: 28 /LPF  White Blood Cell - Urine: 15 /HPF  Red Blood Cell - Urine: 126 /HPF  Bacteria: Occasional /HPF  Epithelial Cells: 14 /HPF        MICROBIOLOGY:  v  .Blood BLOOD  11-15-24   No growth at 48 hours  --  --      .Blood BLOOD  11-15-24   No growth at 48 hours  --  --      Clean Catch  11-15-24   <10,000 CFU/mL Normal Urogenital Charu  --  --      Clean Catch  11-10-24   <10,000 CFU/mL Normal Urogenital Charu  --  --      GC Amplification Result: NotDetec:    Rapid RVP Result: NotDetec (11-15 @ 21:51)        RADIOLOGY:    d< from: US Transvaginal (11.15.24 @ 21:29) >    ACC: [id number] EXAM:  US TRANSVAGINAL   ORDERED BY: ABDIAS DAVIS     PROCEDURE DATE:  11/15/2024          INTERPRETATION:  CLINICAL INFORMATION: Lower abdominal pain. Negative   beta hCG.    LMP: 10/20/2024.    COMPARISON: CT abdomen and pelvis 11/15/2024.    TECHNIQUE:  Endovaginal and transabdominal pelvic sonogram. Color and Spectral   Doppler was performed.    FINDINGS:  Uterus: 10.0 cm x 5.5 cm x 5.9 cm. Leiomyomatous uterus. For reference:   An anterior subserosal fibroid measuring 2.9 x2.9 x 2.8 cm, the lower   uterine segment fibroid measuring 3.6 x 2.9 x 2.8 cm, a pedunculated   subserosal fibroid measuring 2.1 x 2.2 x 2.2 cm.  Endometrium: 7 mm. Within normal limits.    Right ovary: 5.6 cm x 2.8 cm x 2.4 cm. Echogenic lesion measuring 1.0 x   1.1 x 1.0 cm. Normal arterial and venous waveforms.  Left ovary: 2.7 cm x 1.5 cm x 2.2 cm. Within normal limits. Normal   arterial and venous waveforms.    Fluid: Trace fluid.    IMPRESSION:  No sonographic evidence of ovarian torsion.    Fibroid uterus.    A 1.1 cm echogenic left ovarian lesion may represent a hemorrhagic cyst   or corpus albicans.    --- End of Report ---      < end of copied text >  < from: CT Abdomen and Pelvis w/ IV Cont (11.15.24 @ 18:07) >    ACC: [id number] EXAM:  CT ABDOMEN AND PELVIS IC   ORDERED BY: ABDIAS DAVIS     PROCEDURE DATE:  11/15/2024          INTERPRETATION:  CLINICAL INFORMATION: Abdominal pain, Fever and chills   for 2 weeks.    COMPARISON: Abdominal radiograph 10/27/2021. Abdominal ultrasound   3/27/2011. CT chest 11/10/2024.    CONTRAST/COMPLICATIONS:  IV Contrast: Omnipaque 350  90 cc administered   10 cc discarded  Oral Contrast: NONE      PROCEDURE:  CT of the Abdomen and Pelvis was performed.  Sagittal and coronal reformats were performed.    FINDINGS:  LOWER CHEST: Within normal limits.    LIVER: Within normal limits.  BILE DUCTS: Normal caliber.  GALLBLADDER: Within normal limits.  SPLEEN: Partially loculated perisplenic fluid tracking along the left   paracolic gutter.  PANCREAS: Within normal limits.  ADRENALS: Within normal limits.  KIDNEYS/URETERS: Within normal limits.    BLADDER: Within normal limits.  REPRODUCTIVE ORGANS: Multiple heterogeneous uterine masses likely   representing fibroids. Questionable masslike hypoattenuation of the   cervical region, which may represent normal appearance of the cervix   versus an underlying mass.    BOWEL: No bowel obstruction. Appendix is normal.  PERITONEUM/RETROPERITONEUM: Small volume perisplenic and pelvic ascites.  VESSELS: Within normal limits.  LYMPH NODES: Right lower quadrant, anterior to the psoas muscle (301   -60), 1.7 x 1.3 cm nodule or lymph node. Additional nodule/node at the   level of the aortic bifurcation (301-59), 1.7 x 1 cm.  ABDOMINAL WALL: Tiny fat-containing right inguinal hernia.  BONES: Within normal limits.    IMPRESSION:  Partially loculated perisplenic fluid tracking along the left paracolic   gutter.    Multiple heterogeneous uterine masses likely representing fibroids.   Questionable masslike hypoattenuation of the cervical region, which may   represent normal appearance of the cervix, however an underlying mass   cannot be excluded. Recommend further evaluation with pelvic ultrasound   or nonemergent contrast-enhanced pelvic MRI.    Nonspecific soft tissue nodules or lymph nodes in the right lower   quadrant and at the level of the aortic bifurcation.        --- End of Report ---      < end of copied text >

## 2024-11-18 NOTE — PROGRESS NOTE ADULT - ATTENDING COMMENTS
I have personally seen and examined patient on the above date. I discussed the case with resident and I agree with the findings and plan as detailed per note above, which I have amended where appropriate.    Ms. Garza is a 30 y/o F with PMH of HTN who presented to [facility] with ongoing worsening lower abd pain, fevers, chills for the past 2 weeks, previously seen in [facility] ED on 11/5/24, found with UTI, discharged on cefpodoxime, presented back to [facility] ED on 11/10 with similar sx and discharged from ED and now admitted due to persistent nature of the abdominal pain for further evaluation.     Pt seen and examined at bedside this morning. Abdominal pain resolved. Tolerating diet. EBV serologies reviewed, d/w ID and report it is remote infection and to c/w current abx therapy. No cp, sob, fevers, chills, abd pain, melena/hematochezia. +BM. Labs and vitals reviewed.    #SIRS + on admission   - Persistent fevers   - CT AP notable for: loculated perisplenic fluid along with L paracolic gutter. Consult IR for possible drainage   - monitor fever curve    #Lower abd pain   #Fibroids   - CT AP notable for: loculated perisplenic fluid along with L paracolic gutter, multiple heterogenous uterine masses likely fibroids, nonspecific soft tissue nodules/lymph nodes in RLQ and at the level of aortic bifurcation    - TVUS on 11/15 -> negative for torsion, fibroid uterus, 1.1cm echogenic L ovarian torsion may represent a hemorrhagic cyst or corpus albicans   - GYN consulted in ED, TVUS as above, rec tx for possible PID  - UA on 11/10 notable for: cloudy, orange urine, trace LE, +bacteria, WBC 17, +blood, CFU <10K. RVP negative.   - ID recs appreciated   - c/w CTX/Doxycycline/flagyl. EBV labs reviewed and per ID, suspect remote infection. syphilis screen negative   - F.u BCX = NGTD thus far   - may need prior urine culture results for further investigation   - HIV negative   - f/u GC chlyamdia negative  - drug screen       #HTN  - c/w home metoprolol for now, monitor vitals closely    #SILVESTRE   - resolved   - monitor     #Proteinuria  - noted to have proteinuria previously, urine protein      Rest of plan as above.

## 2024-11-18 NOTE — PROGRESS NOTE ADULT - PROBLEM SELECTOR PLAN 1
normal wbc clinically unknown source: GYN vs  vs perisplenic loculated ascites  UTI s/p cefpodoxime 200mg BID treatment at home for UTI, last dose today,   11/10 [facility] Urine culture showed normal urogenital oanh  11/10 CT/PE bl axillary lymphadenopathy   11/15 CT abdomen reveals partially loculated perisplenic fluid. Multiple uterine masses likely representing fibroids. Nonspecific soft tissue nodules or lymph nodes in RLQ and level of aortic bifurfication.  11/17 -ve HIV, EBV, syphilis, blood clx NGTD, uclx wnl    Plan:  - f/u Chlamydia/GC/Trich  - empirical PID coverage/: CTX, Doxy 100mg BID 14d, flagyl 500mg BID 14d if no other source per GYN.   - ID recs appreciated; rec c/w current regimen. suspect +EBV, remote infection (not acute). normal wbc clinically unknown source: GYN vs  vs perisplenic loculated ascites  UTI s/p cefpodoxime 200mg BID treatment at home for UTI, last dose today,   11/10 [facility] Urine culture showed normal urogenital oanh  11/10 CT/PE bl axillary lymphadenopathy   11/15 CT abdomen reveals partially loculated perisplenic fluid. Multiple uterine masses likely representing fibroids. Nonspecific soft tissue nodules or lymph nodes in RLQ and level of aortic bifurfication.  11/17 -ve HIV, EBV, syphilis, blood clx NGTD, uclx wnl    Plan:  - f/u Chlamydia/GC/Trich  - empirical PID coverage/: CTX, Doxy 100mg BID 14d, flagyl 500mg BID 14d if no other source per GYN.   - ID recs appreciated; rec c/w current regimen. suspect +EBV, remote infection (not acute).  - IR consulted for perisplenic loculated ascites: will consider CT drainage vs US guided drainage

## 2024-11-18 NOTE — PROGRESS NOTE ADULT - SUBJECTIVE AND OBJECTIVE BOX
PROGRESS NOTE:     Patient is a 29y old  Female who presents with a chief complaint of Abdominal Pain, Fever (17 Nov 2024 14:34)      SUBJECTIVE / OVERNIGHT EVENTS:    OVERNIGHT: No acute overnight events.      Patient was examined at bedside and feels well this morning. Denies fever, chills, chest pain, SOB, nausea, vomiting. ROS otherwise negative and pt is amenable to current treatment plan.      REVIEW OF SYSTEMS:    CONSTITUTIONAL:  No weakness, fevers, or chills  EYES/ENT:  No visual changes, vertigo, or throat pain   NECK:  No pain or stiffness  RESPIRATORY:  No SOB, cough, wheezing, or hemoptysis  CARDIOVASCULAR:  No chest pain or palpitations  GASTROINTESTINAL:  No abdominal pain, nausea, vomiting, or hematemesis; No diarrhea or constipation; No melena or hematochezia.  GENITOURINARY:  No dysuria, change in frequency, or hematuria  NEUROLOGICAL:  No numbness or weakness  SKIN:  No itching or rashes      MEDICATIONS  (STANDING):  cefTRIAXone   IVPB 1000 milliGRAM(s) IV Intermittent every 24 hours  doxycycline IVPB 100 milliGRAM(s) IV Intermittent every 12 hours  enoxaparin Injectable 40 milliGRAM(s) SubCutaneous every 24 hours  fluticasone propionate 50 MICROgram(s)/spray Nasal Spray 2 Spray(s) Both Nostrils two times a day  influenza   Vaccine 0.5 milliLiter(s) IntraMuscular once  metoprolol succinate  milliGRAM(s) Oral daily  metroNIDAZOLE  IVPB 500 milliGRAM(s) IV Intermittent every 12 hours  metroNIDAZOLE  IVPB        MEDICATIONS  (PRN):  acetaminophen     Tablet .. 650 milliGRAM(s) Oral every 6 hours PRN Temp greater or equal to 38C (100.4F), Mild Pain (1 - 3)  aluminum hydroxide/magnesium hydroxide/simethicone Suspension 30 milliLiter(s) Oral every 4 hours PRN Dyspepsia  guaiFENesin Oral Liquid (Sugar-Free) 100 milliGRAM(s) Oral once PRN Cough  melatonin 3 milliGRAM(s) Oral at bedtime PRN Insomnia      CAPILLARY BLOOD GLUCOSE        I&O's Summary      PHYSICAL EXAM:  Vital Signs Last 24 Hrs  T(C): 36.9 (18 Nov 2024 05:05), Max: 38.3 (17 Nov 2024 20:52)  T(F): 98.4 (18 Nov 2024 05:05), Max: 100.9 (17 Nov 2024 20:52)  HR: 95 (18 Nov 2024 05:05) (93 - 105)  BP: 140/100 (18 Nov 2024 05:05) (137/95 - 170/114)  BP(mean): --  RR: 17 (18 Nov 2024 05:05) (17 - 19)  SpO2: 100% (18 Nov 2024 05:05) (95% - 100%)    Parameters below as of 18 Nov 2024 05:05  Patient On (Oxygen Delivery Method): room air        CONSTITUTIONAL: NAD; well-developed  HEENT: PERRL, clear conjunctiva  RESPIRATORY: Normal respiratory effort; lungs are clear to auscultation bilaterally; No crackles/rhonchi/wheezing  CARDIOVASCULAR: Regular rate and rhythm, normal S1 and S2, no murmur/rub/gallop; No lower extremity edema; Peripheral pulses are 2+ bilaterally  ABDOMEN: Nontender to palpation, normoactive bowel sounds, no rebound/guarding; No hepatosplenomegaly  MUSCULOSKELETAL: No clubbing or cyanosis of digits; no joint swelling or tenderness to palpation  EXTREMITY: Lower extremities non-tender to palpation; non-erythematous B/L  NEURO: A&Ox3; no focal deficits   PSYCH: Normal mood; affect appropriate    LABS:                        8.6    3.97  )-----------( 181      ( 17 Nov 2024 06:53 )             26.2     11-17    139  |  105  |  10  ----------------------------<  101[H]  4.0   |  21[L]  |  1.14    Ca    8.2[L]      17 Nov 2024 06:53  Phos  3.3     11-17  Mg     1.60     11-17    TPro  6.9  /  Alb  2.8[L]  /  TBili  0.4  /  DBili  x   /  AST  26  /  ALT  8   /  AlkPhos  52  11-17          Urinalysis Basic - ( 17 Nov 2024 06:53 )    Color: x / Appearance: x / SG: x / pH: x  Gluc: 101 mg/dL / Ketone: x  / Bili: x / Urobili: x   Blood: x / Protein: x / Nitrite: x   Leuk Esterase: x / RBC: x / WBC x   Sq Epi: x / Non Sq Epi: x / Bacteria: x        Culture - Blood (collected 15 Nov 2024 21:45)  Source: .Blood BLOOD  Preliminary Report (18 Nov 2024 03:00):    No growth at 48 Hours    Culture - Blood (collected 15 Nov 2024 21:30)  Source: .Blood BLOOD  Preliminary Report (18 Nov 2024 03:00):    No growth at 48 Hours    Urinalysis with Rflx Culture (collected 15 Nov 2024 15:30)    Culture - Urine (collected 15 Nov 2024 15:30)  Source: Clean Catch  Final Report (16 Nov 2024 19:53):    <10,000 CFU/mL Normal Urogenital Charu        RADIOLOGY & ADDITIONAL TESTS:    N/A PROGRESS NOTE:     Patient is a 29y old  Female who presents with a chief complaint of Abdominal Pain, Fever (17 Nov 2024 14:34)      SUBJECTIVE / OVERNIGHT EVENTS:    OVERNIGHT: Febrile overnight, complained of nausea, and chills. Given Tylenol, with good response.       Patient was examined at bedside and feels well this morning. Denies fever, chills, chest pain, SOB, nausea, vomiting. ROS otherwise negative and pt is amenable to current treatment plan.      REVIEW OF SYSTEMS:    CONSTITUTIONAL:  No weakness, fevers, or chills  EYES/ENT:  No visual changes, vertigo, or throat pain   NECK:  No pain or stiffness  RESPIRATORY:  No SOB, cough, wheezing, or hemoptysis  CARDIOVASCULAR:  No chest pain or palpitations  GASTROINTESTINAL:  No abdominal pain, nausea, vomiting, or hematemesis; No diarrhea or constipation; No melena or hematochezia.  GENITOURINARY:  No dysuria, change in frequency, or hematuria  NEUROLOGICAL:  No numbness or weakness  SKIN:  No itching or rashes      MEDICATIONS  (STANDING):  cefTRIAXone   IVPB 1000 milliGRAM(s) IV Intermittent every 24 hours  doxycycline IVPB 100 milliGRAM(s) IV Intermittent every 12 hours  enoxaparin Injectable 40 milliGRAM(s) SubCutaneous every 24 hours  fluticasone propionate 50 MICROgram(s)/spray Nasal Spray 2 Spray(s) Both Nostrils two times a day  influenza   Vaccine 0.5 milliLiter(s) IntraMuscular once  metoprolol succinate  milliGRAM(s) Oral daily  metroNIDAZOLE  IVPB 500 milliGRAM(s) IV Intermittent every 12 hours  metroNIDAZOLE  IVPB        MEDICATIONS  (PRN):  acetaminophen     Tablet .. 650 milliGRAM(s) Oral every 6 hours PRN Temp greater or equal to 38C (100.4F), Mild Pain (1 - 3)  aluminum hydroxide/magnesium hydroxide/simethicone Suspension 30 milliLiter(s) Oral every 4 hours PRN Dyspepsia  guaiFENesin Oral Liquid (Sugar-Free) 100 milliGRAM(s) Oral once PRN Cough  melatonin 3 milliGRAM(s) Oral at bedtime PRN Insomnia      CAPILLARY BLOOD GLUCOSE        I&O's Summary      PHYSICAL EXAM:  Vital Signs Last 24 Hrs  T(C): 36.9 (18 Nov 2024 05:05), Max: 38.3 (17 Nov 2024 20:52)  T(F): 98.4 (18 Nov 2024 05:05), Max: 100.9 (17 Nov 2024 20:52)  HR: 95 (18 Nov 2024 05:05) (93 - 105)  BP: 140/100 (18 Nov 2024 05:05) (137/95 - 170/114)  BP(mean): --  RR: 17 (18 Nov 2024 05:05) (17 - 19)  SpO2: 100% (18 Nov 2024 05:05) (95% - 100%)    Parameters below as of 18 Nov 2024 05:05  Patient On (Oxygen Delivery Method): room air        CONSTITUTIONAL: NAD; well-developed  HEENT: PERRL, clear conjunctiva  RESPIRATORY: Normal respiratory effort; lungs are clear to auscultation bilaterally; No crackles/rhonchi/wheezing  CARDIOVASCULAR: Regular rate and rhythm, normal S1 and S2, no murmur/rub/gallop; No lower extremity edema; Peripheral pulses are 2+ bilaterally  ABDOMEN: Nontender to palpation, normoactive bowel sounds, no rebound/guarding; No hepatosplenomegaly  MUSCULOSKELETAL: No clubbing or cyanosis of digits; no joint swelling or tenderness to palpation  EXTREMITY: Lower extremities non-tender to palpation; non-erythematous B/L  NEURO: A&Ox3; no focal deficits   PSYCH: Normal mood; affect appropriate    LABS:                        8.6    3.97  )-----------( 181      ( 17 Nov 2024 06:53 )             26.2     11-17    139  |  105  |  10  ----------------------------<  101[H]  4.0   |  21[L]  |  1.14    Ca    8.2[L]      17 Nov 2024 06:53  Phos  3.3     11-17  Mg     1.60     11-17    TPro  6.9  /  Alb  2.8[L]  /  TBili  0.4  /  DBili  x   /  AST  26  /  ALT  8   /  AlkPhos  52  11-17          Urinalysis Basic - ( 17 Nov 2024 06:53 )    Color: x / Appearance: x / SG: x / pH: x  Gluc: 101 mg/dL / Ketone: x  / Bili: x / Urobili: x   Blood: x / Protein: x / Nitrite: x   Leuk Esterase: x / RBC: x / WBC x   Sq Epi: x / Non Sq Epi: x / Bacteria: x        Culture - Blood (collected 15 Nov 2024 21:45)  Source: .Blood BLOOD  Preliminary Report (18 Nov 2024 03:00):    No growth at 48 Hours    Culture - Blood (collected 15 Nov 2024 21:30)  Source: .Blood BLOOD  Preliminary Report (18 Nov 2024 03:00):    No growth at 48 Hours    Urinalysis with Rflx Culture (collected 15 Nov 2024 15:30)    Culture - Urine (collected 15 Nov 2024 15:30)  Source: Clean Catch  Final Report (16 Nov 2024 19:53):    <10,000 CFU/mL Normal Urogenital Charu        RADIOLOGY & ADDITIONAL TESTS:    N/A

## 2024-11-18 NOTE — CONSULT NOTE ADULT - SUBJECTIVE AND OBJECTIVE BOX
Interventional Radiology    Evaluate for Procedure:     HPI: 29y Female with PMHx HTN is admitted for persistent abdominal pain, fever and chills. Fever etiology suspected to be GYN vs.  origin. Urine culture 11/10 negative. CT abdomen and pelvis 11/15/24 showed partially loculated perisplenic fluid collection that tracks into the left paracolic gutter. WBC 3.26. IR consulted for possible perisplenic fluid collection drainage.     Allergies: No Known Allergies    Medications (Abx/Cardiac/Anticoagulation/Blood Products)  cefTRIAXone   IVPB: 100 mL/Hr IV Intermittent (11-18 @ 05:29)  doxycycline IVPB: 100 mL/Hr IV Intermittent (11-18 @ 05:29)  enoxaparin Injectable: 40 milliGRAM(s) SubCutaneous (11-17 @ 21:13)  metoprolol succinate ER: 100 milliGRAM(s) Oral (11-18 @ 05:29)  metroNIDAZOLE  IVPB: 100 mL/Hr IV Intermittent (11-18 @ 05:01)    Data:    T(C): 37.1  HR: 97  BP: 139/99  RR: 18  SpO2: 100%    -WBC 3.26 / HgB 8.4 / Hct 25.8 / Plt 172  -Na 138 / Cl 105 / BUN 11 / Glucose 102  -K 4.0 / CO2 22 / Cr 1.20  -ALT 12 / Alk Phos 49 / T.Bili 0.3      Radiology:     CT abdomen and pelvis 11/15/24 was reviewed.     Assessment/Plan:   29/ year old female with PMHx HTN presents with a perisplenic fluid collection that tracks into the left paracolic gutter noted on CT abdomen and pelvis 11/15/24. IR consulted for possible drainage of perisplenic fluid collection.     -- Recommend ultrasound of the spleen to localize the perisplenic fluid collection for procedural planning  -- CT guided vs ultrasound guided procedure to be determined based on ultrasound results  -- In the mean time, keep labs up to date (CBC/BMP) and please obtain coags  -- If procedure to be performed, will likely begin with an aspiration. If aspirated fluid is purulent, will leave a drain however if aspirated fluid seems clear, will just send for fluid studies.     Patient discussed with Dr. Izquierdo.     --  Maxwell Mosher DO, PGY-2  Vascular and Interventional Radiology   Available on [employer] Teams    - Non-emergent consults: Place IR consult order in [place]  - Emergent issues (pager): [facility] [phone]; [facility] [phone]; [id number]  - Scheduling questions: [facility] [phone]; [facility] [phone]  - Clinic/outpatient booking: [facility] [phone]; [facility] [phone]

## 2024-11-18 NOTE — PROGRESS NOTE ADULT - ASSESSMENT
29F with PHM HTN presenting with lower abdominal pain, fever and chills for the past 2 weeks. evaluated by GYN at the ED, working ddx GYN vs  vs perisplenic ascites, Started on CTX/Doxy/Flagyl (11/15-).      Relevant hx:  - s/p Cefpodoxime from suspected UTI from [facility],   - represented to [facility] 11/10 for similar symptoms CT PE showed perisplenic ascites and b/l axillary lymphadenopathy

## 2024-11-18 NOTE — PROGRESS NOTE ADULT - PROBLEM SELECTOR PLAN 4
Hgb 8.6 <-9    Plan:  - f/u nutritional lab  - f/u hemolysis lab  -reports menstrual period about to come, not actively menstruating.

## 2024-11-18 NOTE — PROGRESS NOTE ADULT - ASSESSMENT
29 y.o. female with PHM HTN presented 11/15 with lower abdominal pain, fever and chills for the past 2 weeks    Assessment:  #Fever  #Abdominal pain  -UTI s/p cefpodoxime 200mg BID treatment at home for UTI  Persistent low grade fever despite antibiotics   -UA+, BCX no growth   -HIV neg   -CT A/P Partially loculated perisplenic fluid tracking along the left paracolic gutter.  Multiple heterogeneous uterine masses likely representing fibroids. Questionable masslike hypoattenuation of the cervical region, which may represent normal appearance of the cervix, however an underlying mass cannot be excluded.  Nonspecific soft tissue nodules or lymph nodes in the right lower   quadrant and at the level of the aortic bifurcation.  -Transvaginal US: No sonographic evidence of ovarian torsion. Fibroid uterus. A 1.1 cm echogenic left ovarian lesion may represent a hemorrhagic cyst or corpus albicans.      -fever likely 2/2  source     - IR evaluating for aspiration     - EBV serology consistent with remote infection not acute     Suggest;  -Cont Ceftriaxone, flagyl and doxy for now

## 2024-11-18 NOTE — PROGRESS NOTE ADULT - PROBLEM SELECTOR PLAN 7
11/10 proteinuria > 1000  11/15 proteinuria > 500  a1c 5.4; Pr/Cr 2, normal Cr  ddx transient proteinuria, orthostatic proteinuria vs GN     Plan:   - outpatient nephrology follow up has appointment for 11/19/24

## 2024-11-19 LAB
ANION GAP SERPL CALC-SCNC: 10 MMOL/L — SIGNIFICANT CHANGE UP (ref 7–14)
APPEARANCE UR: ABNORMAL
BACTERIA # UR AUTO: NEGATIVE /HPF — SIGNIFICANT CHANGE UP
BILIRUB UR-MCNC: NEGATIVE — SIGNIFICANT CHANGE UP
BUN SERPL-MCNC: 9 MG/DL — SIGNIFICANT CHANGE UP (ref 7–23)
C3 SERPL-MCNC: 34 MG/DL — LOW (ref 90–180)
C4 SERPL-MCNC: <4 MG/DL — LOW (ref 10–40)
CALCIUM SERPL-MCNC: 8.7 MG/DL — SIGNIFICANT CHANGE UP (ref 8.4–10.5)
CAST: 3 /LPF — SIGNIFICANT CHANGE UP (ref 0–4)
CHLORIDE SERPL-SCNC: 102 MMOL/L — SIGNIFICANT CHANGE UP (ref 98–107)
CO2 SERPL-SCNC: 22 MMOL/L — SIGNIFICANT CHANGE UP (ref 22–31)
COLOR SPEC: SIGNIFICANT CHANGE UP
CREAT SERPL-MCNC: 1.23 MG/DL — SIGNIFICANT CHANGE UP (ref 0.5–1.3)
DIFF PNL FLD: ABNORMAL
EGFR: 60 ML/MIN/1.73M2 — SIGNIFICANT CHANGE UP
GLUCOSE SERPL-MCNC: 108 MG/DL — HIGH (ref 70–99)
GLUCOSE UR QL: NEGATIVE MG/DL — SIGNIFICANT CHANGE UP
HCT VFR BLD CALC: 27.4 % — LOW (ref 34.5–45)
HGB BLD-MCNC: 8.9 G/DL — LOW (ref 11.5–15.5)
KETONES UR-MCNC: NEGATIVE MG/DL — SIGNIFICANT CHANGE UP
LEUKOCYTE ESTERASE UR-ACNC: ABNORMAL
MAGNESIUM SERPL-MCNC: 1.6 MG/DL — SIGNIFICANT CHANGE UP (ref 1.6–2.6)
MCHC RBC-ENTMCNC: 31.4 PG — SIGNIFICANT CHANGE UP (ref 27–34)
MCHC RBC-ENTMCNC: 32.5 G/DL — SIGNIFICANT CHANGE UP (ref 32–36)
MCV RBC AUTO: 96.8 FL — SIGNIFICANT CHANGE UP (ref 80–100)
NITRITE UR-MCNC: NEGATIVE — SIGNIFICANT CHANGE UP
NRBC # BLD: 0 /100 WBCS — SIGNIFICANT CHANGE UP (ref 0–0)
NRBC # FLD: 0 K/UL — SIGNIFICANT CHANGE UP (ref 0–0)
PH UR: 6 — SIGNIFICANT CHANGE UP (ref 5–8)
PHOSPHATE SERPL-MCNC: 3.6 MG/DL — SIGNIFICANT CHANGE UP (ref 2.5–4.5)
PLATELET # BLD AUTO: 191 K/UL — SIGNIFICANT CHANGE UP (ref 150–400)
POTASSIUM SERPL-MCNC: 3.6 MMOL/L — SIGNIFICANT CHANGE UP (ref 3.5–5.3)
POTASSIUM SERPL-SCNC: 3.6 MMOL/L — SIGNIFICANT CHANGE UP (ref 3.5–5.3)
PROCALCITONIN SERPL-MCNC: 0.12 NG/ML — HIGH (ref 0.02–0.1)
PROT UR-MCNC: 300 MG/DL
RBC # BLD: 2.83 M/UL — LOW (ref 3.8–5.2)
RBC # FLD: 12.9 % — SIGNIFICANT CHANGE UP (ref 10.3–14.5)
RBC CASTS # UR COMP ASSIST: 191 /HPF — HIGH (ref 0–4)
SODIUM SERPL-SCNC: 134 MMOL/L — LOW (ref 135–145)
SP GR SPEC: 1.01 — SIGNIFICANT CHANGE UP (ref 1–1.03)
SQUAMOUS # UR AUTO: 6 /HPF — HIGH (ref 0–5)
UROBILINOGEN FLD QL: 1 MG/DL — SIGNIFICANT CHANGE UP (ref 0.2–1)
WBC # BLD: 3.96 K/UL — SIGNIFICANT CHANGE UP (ref 3.8–10.5)
WBC # FLD AUTO: 3.96 K/UL — SIGNIFICANT CHANGE UP (ref 3.8–10.5)
WBC UR QL: 10 /HPF — HIGH (ref 0–5)

## 2024-11-19 PROCEDURE — 99232 SBSQ HOSP IP/OBS MODERATE 35: CPT

## 2024-11-19 PROCEDURE — 93010 ELECTROCARDIOGRAM REPORT: CPT

## 2024-11-19 PROCEDURE — 72197 MRI PELVIS W/O & W/DYE: CPT | Mod: 26

## 2024-11-19 RX ORDER — CYANOCOBALAMIN/FOLIC AC/VIT B6 1-2.2-25MG
1 TABLET ORAL DAILY
Refills: 0 | Status: DISCONTINUED | OUTPATIENT
Start: 2024-11-19 | End: 2024-11-22

## 2024-11-19 RX ORDER — ONDANSETRON HYDROCHLORIDE 4 MG/1
4 TABLET, FILM COATED ORAL ONCE
Refills: 0 | Status: COMPLETED | OUTPATIENT
Start: 2024-11-19 | End: 2024-11-19

## 2024-11-19 RX ADMIN — DOXYCYCLINE HYCLATE 100 MILLIGRAM(S): 150 TABLET, COATED ORAL at 05:18

## 2024-11-19 RX ADMIN — METRONIDAZOLE 100 MILLIGRAM(S): 500 TABLET ORAL at 05:02

## 2024-11-19 RX ADMIN — Medication 1 TABLET(S): at 14:20

## 2024-11-19 RX ADMIN — ONDANSETRON HYDROCHLORIDE 4 MILLIGRAM(S): 4 TABLET, FILM COATED ORAL at 09:42

## 2024-11-19 RX ADMIN — DOXYCYCLINE HYCLATE 100 MILLIGRAM(S): 150 TABLET, COATED ORAL at 17:04

## 2024-11-19 RX ADMIN — METRONIDAZOLE 100 MILLIGRAM(S): 500 TABLET ORAL at 18:24

## 2024-11-19 RX ADMIN — METOPROLOL TARTRATE 100 MILLIGRAM(S): 100 TABLET, FILM COATED ORAL at 05:17

## 2024-11-19 RX ADMIN — Medication 100 MILLIGRAM(S): at 05:18

## 2024-11-19 NOTE — SBIRT NOTE ADULT - NSSBIRTALCPOSREINDET_GEN_A_CORE
SW consult was ordered for positive SBIRT screen.  Pt reported that she drinks alcohol at least 1 to 2 times per month at social gatherings.  SW intern offered pt educational resources for alcohol use. Pt refused educational resources.

## 2024-11-19 NOTE — PROGRESS NOTE ADULT - PROBLEM SELECTOR PLAN 4
Hgb 8.6 <-9    Plan:  - f/u nutritional lab  - f/u hemolysis lab  -reports menstrual period about to come, not actively menstruating. Hgb 8.9  11/18 serum B12 and folate WNL    Plan:  - f/u hemolysis lab  -reports menstrual period about to come, not actively menstruating. Hgb 8.9  - 11/18 ferritin 688 high, total Fe 47 normal, TIBC 139 low - consistent with chronic inflammation/anemia of chronic disease picture  - 11/18 serum B12 and folate in normal range  - 11/18 LDH high; normal haptoglobin, reticulocyte%, and absolute retics. No nucleated RBCs. Hemolysis unlikely  - reports menstrual period about to come, not actively menstruating.    Plan:  - f/u autoimmune/inflammatory workup as above

## 2024-11-19 NOTE — PROGRESS NOTE ADULT - PROBLEM SELECTOR PLAN 2
Differential includes GYN etiology ruptured ovarian cyst vs PMD/fibroid vs. endometriosis vs infectious causes PID vs  pyelonephritis  BHCG -ve  11/15 CT abdomen reveals partially loculated perisplenic fluid. Multiple uterine masses likely representing fibroids. Nonspecific soft tissue nodules or lymph nodes in RLQ and level of aortic bifurfication.  Patient was seen by Ob/gyn in ED, low clinical suspicion for PID at this time given no CMT.  11/15 TVUS -ve for torsion; > 1.1 cm echogenic left ovarian lesion may represent a hemorrhagic cyst or corpus albicans. Fibroid uterus.      Plan   - resolved at present  - workup as above  - Gyn consulted on admission, no acute intervention Differential includes GYN etiology ruptured ovarian cyst vs PMD/fibroid vs. endometriosis vs infectious causes PID vs  pyelonephritis  BHCG -ve  11/15 CT abdomen reveals partially loculated perisplenic fluid. Multiple uterine masses likely representing fibroids. Nonspecific soft tissue nodules or lymph nodes in RLQ and level of aortic bifurcation.  Patient was seen by Ob/gyn in ED, low clinical suspicion for PID at this time given no CMT.  11/15 TVUS -ve for torsion; > 1.1 cm echogenic left ovarian lesion may represent a hemorrhagic cyst or corpus albicans. Fibroid uterus.      Plan   - resolved at present  - workup as above  - Gyn consulted on admission, no acute intervention

## 2024-11-19 NOTE — CHART NOTE - NSCHARTNOTEFT_GEN_A_CORE
IR Follow-Up     29/ year old female with PMHx HTN presents with a perisplenic fluid collection that tracks into the left paracolic gutter noted on CT abdomen and pelvis 11/15/24. IR consulted for possible drainage of perisplenic fluid collection.     Abdominal US done 11/18/2024 was reviewed and perisplenic collection was able to be sonographically localized.     -- Will plan for perisplenic collection aspiration/drainage on 11/21/2024  -- Please make patient NPO midnight before procedure  -- Hold AM anticoagulation   -- Please draw coags and keep rest of labs up to date (BMP/CBC)  -- Will likely begin with an aspiration. If aspirated fluid is purulent, will leave a drain however if aspirated fluid seems clear, will just send for fluid studies    Patient discussed with Dr. Izquierdo.     --  Maxwell Mosher DO, PGY-2  Vascular and Interventional Radiology   Available on [employer] Teams    For EMERGENT inquiries/questions:  IR Pager ([facility]): [phone]  IR Pager ([facility]): [phone] ; [phone]    For non-emergent consults/questions:   Please place a sunrise order "Consult- Interventional Radiology" with an appropriate callback number    For questions about scheduling during appropriate work hours, call IR :  [facility]: [phone]  [facility]: [phone]    For outpatient IR booking:  [facility]: [phone]  [facility]: [phone] IR Follow-Up     29/ year old female with PMHx HTN presents with a perisplenic fluid collection that tracks into the left paracolic gutter noted on CT abdomen and pelvis 11/15/24. IR consulted for possible drainage of perisplenic fluid collection.     Abdominal US done 11/18/2024 was reviewed and perisplenic collection was able to be sonographically localized.     -- Will plan for perisplenic collection aspiration/drainage on 11/22/2024  -- Please make patient NPO midnight before procedure  -- Hold AM anticoagulation   -- Please draw coags and keep rest of labs up to date (BMP/CBC)  -- Will likely begin with an aspiration. If aspirated fluid is purulent, will leave a drain however if aspirated fluid seems clear, will just send for fluid studies    Patient discussed with Dr. Izquierdo.     --  Maxwell Mosher DO, PGY-2  Vascular and Interventional Radiology   Available on [employer] Teams    For EMERGENT inquiries/questions:  IR Pager ([facility]): [phone]  IR Pager ([facility]): [phone] ; [phone]    For non-emergent consults/questions:   Please place a sunrise order "Consult- Interventional Radiology" with an appropriate callback number    For questions about scheduling during appropriate work hours, call IR :  [facility]: [phone]  [facility]: [phone]    For outpatient IR booking:  [facility]: [phone]  [facility]: [phone]

## 2024-11-19 NOTE — PROGRESS NOTE ADULT - SUBJECTIVE AND OBJECTIVE BOX
Follow Up:      Interval History/ROS:  afebrile   IR evaluating for aspiration     Allergies  No Known Allergies        ANTIMICROBIALS:  cefTRIAXone   IVPB 1000 every 24 hours  doxycycline IVPB 100 every 12 hours  metroNIDAZOLE  IVPB 500 every 12 hours  metroNIDAZOLE  IVPB            OTHER MEDS:  acetaminophen     Tablet .. 650 milliGRAM(s) Oral every 6 hours PRN  aluminum hydroxide/magnesium hydroxide/simethicone Suspension 30 milliLiter(s) Oral every 4 hours PRN  enoxaparin Injectable 40 milliGRAM(s) SubCutaneous every 24 hours  fluticasone propionate 50 MICROgram(s)/spray Nasal Spray 2 Spray(s) Both Nostrils two times a day  influenza   Vaccine 0.5 milliLiter(s) IntraMuscular once  melatonin 3 milliGRAM(s) Oral at bedtime PRN  metoprolol succinate  milliGRAM(s) Oral daily      Vital Signs Last 24 Hrs  T(F): 98.9 (11-19-24 @ 14:30), Max: 98.9 (11-19-24 @ 14:30)  HR: 95 (11-19-24 @ 14:30)  BP: 148/95 (11-19-24 @ 14:30)  RR: 18 (11-19-24 @ 14:30)  SpO2: 100% (11-19-24 @ 14:30) (100% - 100%)        PHYSICAL EXAM:  Constitutional: Not in acute distress  Eyes: No icterus.  Oral cavity: Clear, no lesions  Neck: Supple  RS: Chest clear   CVS: no respiratory distress RA  Abdomen: Soft. No guarding/rigidity/tenderness.  : no pearl  Skin: No lesions noted  Neuro: Alert, oriented to time/place/person  Cranial nerves 2-12 grossly normal. No focal abnormalities                                     8.9    3.96  )-----------( 191      ( 19 Nov 2024 05:48 )             27.4 11-19    134  |  102  |  9   ----------------------------<  108  3.6   |  22  |  1.23  Ca    8.7      19 Nov 2024 05:48Phos  3.6     11-19Mg     1.60     11-19  TPro  6.8  /  Alb  2.7  /  TBili  0.3  /  DBili  x   /  AST  25  /  ALT  12  /  AlkPhos  49  11-18      urinUrine Microscopic-Add On (NC) (11.15.24 @ 15:30)   Review: Reviewed  Cast: 28 /LPF  White Blood Cell - Urine: 15 /HPF  Red Blood Cell - Urine: 126 /HPF  Bacteria: Occasional /HPF  Epithelial Cells: 14 /HPF        MICROBIOLOGY:  v  .Blood BLOOD  11-15-24   No growth at 72 hours  --  --      .Blood BLOOD  11-15-24   No growth at 72 hours  --  --      Clean Catch  11-15-24   <10,000 CFU/mL Normal Urogenital Charu  --  --      Clean Catch  11-10-24   <10,000 CFU/mL Normal Urogenital Charu  --  --      GC Amplification Result: NotDetec:    Rapid RVP Result: NotDetec (11-15 @ 21:51)        RADIOLOGY:    d< from: US Transvaginal (11.15.24 @ 21:29) >    ACC: [id number] EXAM:  US TRANSVAGINAL   ORDERED BY: ABDIAS DAVIS     PROCEDURE DATE:  11/15/2024          INTERPRETATION:  CLINICAL INFORMATION: Lower abdominal pain. Negative   beta hCG.    LMP: 10/20/2024.    COMPARISON: CT abdomen and pelvis 11/15/2024.    TECHNIQUE:  Endovaginal and transabdominal pelvic sonogram. Color and Spectral   Doppler was performed.    FINDINGS:  Uterus: 10.0 cm x 5.5 cm x 5.9 cm. Leiomyomatous uterus. For reference:   An anterior subserosal fibroid measuring 2.9 x2.9 x 2.8 cm, the lower   uterine segment fibroid measuring 3.6 x 2.9 x 2.8 cm, a pedunculated   subserosal fibroid measuring 2.1 x 2.2 x 2.2 cm.  Endometrium: 7 mm. Within normal limits.    Right ovary: 5.6 cm x 2.8 cm x 2.4 cm. Echogenic lesion measuring 1.0 x   1.1 x 1.0 cm. Normal arterial and venous waveforms.  Left ovary: 2.7 cm x 1.5 cm x 2.2 cm. Within normal limits. Normal   arterial and venous waveforms.    Fluid: Trace fluid.    IMPRESSION:  No sonographic evidence of ovarian torsion.    Fibroid uterus.    A 1.1 cm echogenic left ovarian lesion may represent a hemorrhagic cyst   or corpus albicans.    --- End of Report ---      < end of copied text >  < from: CT Abdomen and Pelvis w/ IV Cont (11.15.24 @ 18:07) >    ACC: [id number] EXAM:  CT ABDOMEN AND PELVIS IC   ORDERED BY: ABDIAS DAVIS     PROCEDURE DATE:  11/15/2024          INTERPRETATION:  CLINICAL INFORMATION: Abdominal pain, Fever and chills   for 2 weeks.    COMPARISON: Abdominal radiograph 10/27/2021. Abdominal ultrasound   3/27/2011. CT chest 11/10/2024.    CONTRAST/COMPLICATIONS:  IV Contrast: Omnipaque 350  90 cc administered   10 cc discarded  Oral Contrast: NONE      PROCEDURE:  CT of the Abdomen and Pelvis was performed.  Sagittal and coronal reformats were performed.    FINDINGS:  LOWER CHEST: Within normal limits.    LIVER: Within normal limits.  BILE DUCTS: Normal caliber.  GALLBLADDER: Within normal limits.  SPLEEN: Partially loculated perisplenic fluid tracking along the left   paracolic gutter.  PANCREAS: Within normal limits.  ADRENALS: Within normal limits.  KIDNEYS/URETERS: Within normal limits.    BLADDER: Within normal limits.  REPRODUCTIVE ORGANS: Multiple heterogeneous uterine masses likely   representing fibroids. Questionable masslike hypoattenuation of the   cervical region, which may represent normal appearance of the cervix   versus an underlying mass.    BOWEL: No bowel obstruction. Appendix is normal.  PERITONEUM/RETROPERITONEUM: Small volume perisplenic and pelvic ascites.  VESSELS: Within normal limits.  LYMPH NODES: Right lower quadrant, anterior to the psoas muscle (301   -60), 1.7 x 1.3 cm nodule or lymph node. Additional nodule/node at the   level of the aortic bifurcation (301-59), 1.7 x 1 cm.  ABDOMINAL WALL: Tiny fat-containing right inguinal hernia.  BONES: Within normal limits.    IMPRESSION:  Partially loculated perisplenic fluid tracking along the left paracolic   gutter.    Multiple heterogeneous uterine masses likely representing fibroids.   Questionable masslike hypoattenuation of the cervical region, which may   represent normal appearance of the cervix, however an underlying mass   cannot be excluded. Recommend further evaluation with pelvic ultrasound   or nonemergent contrast-enhanced pelvic MRI.    Nonspecific soft tissue nodules or lymph nodes in the right lower   quadrant and at the level of the aortic bifurcation.        --- End of Report ---      < end of copied text >

## 2024-11-19 NOTE — PROGRESS NOTE ADULT - ATTENDING COMMENTS
I have personally seen and examined patient on the above date. I discussed the case with resident and I agree with the findings and plan as detailed per note above, which I have amended where appropriate.    Ms. Garza is a 30 y/o F with PMH of HTN who presented to [facility] with ongoing worsening lower abd pain, fevers, chills for the past 2 weeks, previously seen in [facility] ED on 11/5/24, found with UTI, discharged on cefpodoxime, presented back to [facility] ED on 11/10 with similar sx and discharged from ED and now admitted due to persistent nature of the abdominal pain for further evaluation.     Pt seen and examined at bedside this morning. Abdominal pain resolved. Tolerating diet. EBV serologies reviewed, d/w ID and report it is remote infection and to c/w current abx therapy. No cp, sob, fevers, chills, abd pain, melena/hematochezia. +BM. Labs and vitals reviewed.    #SIRS + on admission   - Persistent fevers   - CT AP notable for: loculated perisplenic fluid along with L paracolic gutter. IR drainage scheduled 11/22  - monitor fever curve  - Pt developed B/L LE purpuric rash, concerning for possible vasculitis. f/u serology     #Lower abd pain   #Fibroids   - CT AP notable for: loculated perisplenic fluid along with L paracolic gutter, multiple heterogenous uterine masses likely fibroids, nonspecific soft tissue nodules/lymph nodes in RLQ and at the level of aortic bifurcation    - TVUS on 11/15 -> negative for torsion, fibroid uterus, 1.1cm echogenic L ovarian torsion may represent a hemorrhagic cyst or corpus albicans   - GYN consulted in ED, TVUS as above, rec tx for possible PID  - UA on 11/10 notable for: cloudy, orange urine, trace LE, +bacteria, WBC 17, +blood, CFU <10K. RVP negative.   - ID recs appreciated   - c/w CTX/Doxycycline/flagyl. EBV labs reviewed and per ID, suspect remote infection. syphilis screen negative   - F.u BCX = NGTD thus far   - HIV negative   - GC chlyamdia negative        #HTN  - c/w home metoprolol for now, monitor vitals closely    #SILVESTRE   - resolved   - monitor     #Proteinuria  - noted to have proteinuria previously, urine protein      Rest of plan as above.

## 2024-11-19 NOTE — PROGRESS NOTE ADULT - PROBLEM SELECTOR PLAN 1
normal wbc clinically unknown source: GYN vs  vs perisplenic loculated ascites  UTI s/p cefpodoxime 200mg BID treatment at home for UTI, last dose today,   11/10 [facility] Urine culture showed normal urogenital oanh  11/10 CT/PE bl axillary lymphadenopathy   11/15 CT abdomen reveals partially loculated perisplenic fluid. Multiple uterine masses likely representing fibroids. Nonspecific soft tissue nodules or lymph nodes in RLQ and level of aortic bifurfication.  11/17 -ve HIV, EBV, syphilis, blood clx NGTD, uclx wnl    Plan:  - f/u Chlamydia/GC/Trich  - empirical PID coverage/: CTX, Doxy 100mg BID 14d, flagyl 500mg BID 14d if no other source per GYN.   - ID recs appreciated; rec c/w current regimen. suspect +EBV, remote infection (not acute).  - IR consulted for perisplenic loculated ascites: will consider CT drainage vs US guided drainage normal wbc clinically unknown source: GYN vs  vs perisplenic loculated ascites  UTI s/p cefpodoxime 200mg BID treatment at home for UTI, last dose today,   11/10 [facility] Urine culture showed normal urogenital oanh  11/10 CT/PE bl axillary lymphadenopathy   11/15 CT abdomen reveals partially loculated perisplenic fluid. Multiple uterine masses likely representing fibroids. Nonspecific soft tissue nodules or lymph nodes in RLQ and level of aortic bifurcation.  11/17 -ve HIV, GC, trichomonas, syphilis, blood clx NGTD, uclx wnl    Plan:  - f/u Chlamydia  - empirical PID coverage/: CTX, Doxy 100mg BID 14d, flagyl 500mg BID 14d if no other source per GYN.   - ID recs appreciated; rec c/w current regimen. suspect +EBV, remote infection (not acute).  - IR consulted for perisplenic loculated ascites: will consider CT drainage vs US guided drainage normal wbc clinically unknown source: GYN vs  vs perisplenic loculated ascites  UTI s/p cefpodoxime 200mg BID treatment at home for UTI, last dose today,   11/10 [facility] Urine culture showed normal urogenital oanh  11/10 CT/PE bl axillary lymphadenopathy   11/15 CT abdomen reveals partially loculated perisplenic fluid. Multiple uterine masses likely representing fibroids. Nonspecific soft tissue nodules or lymph nodes in RLQ and level of aortic bifurcation.  11/17 -ve HIV, GC, trichomonas, syphilis, blood clx NGTD, uclx wnl    Plan:  - f/u C3, C4, ANCA, dsDNA, anti-RNP, anti-SSA workup iso possible autoimmune/inflammatory etiology of fever  - f/u 24hr urine collection, UA+microscopy for proteinuria, casts  - f/u Chlamydia  - f/u procal  - empirical PID coverage/: CTX, Doxy 100mg BID 14d, flagyl 500mg BID 14d if no other source per GYN.   - ID recs appreciated; rec c/w current regimen. suspect +EBV, remote infection (not acute).  - IR consulted for perisplenic loculated ascites: will consider CT drainage vs US guided drainage. US Abd 11/18 shows small volume perisplenic ascites, unchanged from 11/15.

## 2024-11-19 NOTE — PROGRESS NOTE ADULT - PROBLEM SELECTOR PLAN 7
11/10 proteinuria > 1000  11/15 proteinuria > 500  a1c 5.4; Pr/Cr 2, normal Cr  ddx transient proteinuria, orthostatic proteinuria vs GN     Plan:   - outpatient nephrology follow up has appointment for 11/19/24 11/10 proteinuria > 1000  11/15 proteinuria > 500  a1c 5.4; Pr/Cr 2, normal Cr  ddx transient proteinuria, orthostatic proteinuria vs GN     Plan:   - outpatient nephrology follow up; has appointment for 11/19/24  - 24 hr urine collection ordered 11/19 as above

## 2024-11-19 NOTE — PROGRESS NOTE ADULT - SUBJECTIVE AND OBJECTIVE BOX
PROGRESS NOTE:     Patient is a 29y old  Female who presents with a chief complaint of Abdominal Pain, Fever (18 Nov 2024 16:55)      SUBJECTIVE / OVERNIGHT EVENTS:    OVERNIGHT: No acute overnight events.      Patient was examined at bedside and feels well this morning. Denies fever, chills, chest pain, SOB, nausea, vomiting. ROS otherwise negative and pt is amenable to current treatment plan.      REVIEW OF SYSTEMS:    CONSTITUTIONAL:  No weakness, fevers, or chills  EYES/ENT:  No visual changes, vertigo, or throat pain   NECK:  No pain or stiffness  RESPIRATORY:  No SOB, cough, wheezing, or hemoptysis  CARDIOVASCULAR:  No chest pain or palpitations  GASTROINTESTINAL:  No abdominal pain, nausea, vomiting, or hematemesis; No diarrhea or constipation; No melena or hematochezia.  GENITOURINARY:  No dysuria, change in frequency, or hematuria  NEUROLOGICAL:  No numbness or weakness  SKIN:  No itching or rashes      MEDICATIONS  (STANDING):  cefTRIAXone   IVPB 1000 milliGRAM(s) IV Intermittent every 24 hours  doxycycline IVPB 100 milliGRAM(s) IV Intermittent every 12 hours  enoxaparin Injectable 40 milliGRAM(s) SubCutaneous every 24 hours  fluticasone propionate 50 MICROgram(s)/spray Nasal Spray 2 Spray(s) Both Nostrils two times a day  influenza   Vaccine 0.5 milliLiter(s) IntraMuscular once  metoprolol succinate  milliGRAM(s) Oral daily  metroNIDAZOLE  IVPB 500 milliGRAM(s) IV Intermittent every 12 hours  metroNIDAZOLE  IVPB        MEDICATIONS  (PRN):  acetaminophen     Tablet .. 650 milliGRAM(s) Oral every 6 hours PRN Temp greater or equal to 38C (100.4F), Mild Pain (1 - 3)  aluminum hydroxide/magnesium hydroxide/simethicone Suspension 30 milliLiter(s) Oral every 4 hours PRN Dyspepsia  guaiFENesin Oral Liquid (Sugar-Free) 100 milliGRAM(s) Oral once PRN Cough  melatonin 3 milliGRAM(s) Oral at bedtime PRN Insomnia      CAPILLARY BLOOD GLUCOSE        I&O's Summary      PHYSICAL EXAM:  Vital Signs Last 24 Hrs  T(C): 37.1 (19 Nov 2024 05:00), Max: 37.1 (18 Nov 2024 12:20)  T(F): 98.8 (19 Nov 2024 05:00), Max: 98.8 (19 Nov 2024 05:00)  HR: 98 (19 Nov 2024 05:00) (97 - 99)  BP: 142/95 (19 Nov 2024 05:00) (139/99 - 142/95)  BP(mean): --  RR: 17 (19 Nov 2024 05:00) (17 - 18)  SpO2: 100% (19 Nov 2024 05:00) (100% - 100%)    Parameters below as of 19 Nov 2024 05:00  Patient On (Oxygen Delivery Method): room air        CONSTITUTIONAL: NAD; well-developed  HEENT: PERRL, clear conjunctiva  RESPIRATORY: Normal respiratory effort; lungs are clear to auscultation bilaterally; No crackles/rhonchi/wheezing  CARDIOVASCULAR: Regular rate and rhythm, normal S1 and S2, no murmur/rub/gallop; No lower extremity edema; Peripheral pulses are 2+ bilaterally  ABDOMEN: Nontender to palpation, normoactive bowel sounds, no rebound/guarding; No hepatosplenomegaly  MUSCULOSKELETAL: No clubbing or cyanosis of digits; no joint swelling or tenderness to palpation  EXTREMITY: Lower extremities non-tender to palpation; non-erythematous B/L  NEURO: A&Ox3; no focal deficits   PSYCH: Normal mood; affect appropriate    LABS:                        8.9    3.96  )-----------( 191      ( 19 Nov 2024 05:48 )             27.4     11-18    138  |  105  |  11  ----------------------------<  102[H]  4.0   |  22  |  1.20    Ca    8.3[L]      18 Nov 2024 06:20  Phos  3.8     11-18  Mg     1.70     11-18    TPro  6.8  /  Alb  2.7[L]  /  TBili  0.3  /  DBili  x   /  AST  25  /  ALT  12  /  AlkPhos  49  11-18          Urinalysis Basic - ( 18 Nov 2024 06:20 )    Color: x / Appearance: x / SG: x / pH: x  Gluc: 102 mg/dL / Ketone: x  / Bili: x / Urobili: x   Blood: x / Protein: x / Nitrite: x   Leuk Esterase: x / RBC: x / WBC x   Sq Epi: x / Non Sq Epi: x / Bacteria: x          RADIOLOGY & ADDITIONAL TESTS:    N/A PROGRESS NOTE:     Patient is a 29y old  Female who presents with a chief complaint of Abdominal Pain, Fever (18 Nov 2024 16:55)      SUBJECTIVE / OVERNIGHT EVENTS:    OVERNIGHT: No acute overnight events.    Patient seen and examined at bedside this morning. She endorses dry cough and swelling of the bilateral feet and ankles, as well as intermittent nausea without vomiting over the past couple days. She also reports a new nonpainful nonpruritic macules on bilateral feet and calves. Denies abdominal or pelvic pain, fever, chills, chest pain, SOB, sore throat, dysuria, urinary frequency, changes in bowel movements, or paresthesia.     ROS negative except as noted in HPI.        MEDICATIONS  (STANDING):  cefTRIAXone   IVPB 1000 milliGRAM(s) IV Intermittent every 24 hours  doxycycline IVPB 100 milliGRAM(s) IV Intermittent every 12 hours  enoxaparin Injectable 40 milliGRAM(s) SubCutaneous every 24 hours  fluticasone propionate 50 MICROgram(s)/spray Nasal Spray 2 Spray(s) Both Nostrils two times a day  influenza   Vaccine 0.5 milliLiter(s) IntraMuscular once  metoprolol succinate  milliGRAM(s) Oral daily  metroNIDAZOLE  IVPB 500 milliGRAM(s) IV Intermittent every 12 hours  metroNIDAZOLE  IVPB        MEDICATIONS  (PRN):  acetaminophen     Tablet .. 650 milliGRAM(s) Oral every 6 hours PRN Temp greater or equal to 38C (100.4F), Mild Pain (1 - 3)  aluminum hydroxide/magnesium hydroxide/simethicone Suspension 30 milliLiter(s) Oral every 4 hours PRN Dyspepsia  guaiFENesin Oral Liquid (Sugar-Free) 100 milliGRAM(s) Oral once PRN Cough  melatonin 3 milliGRAM(s) Oral at bedtime PRN Insomnia      CAPILLARY BLOOD GLUCOSE        I&O's Summary      PHYSICAL EXAM:  Vital Signs Last 24 Hrs  T(C): 37.1 (19 Nov 2024 05:00), Max: 37.1 (18 Nov 2024 12:20)  T(F): 98.8 (19 Nov 2024 05:00), Max: 98.8 (19 Nov 2024 05:00)  HR: 98 (19 Nov 2024 05:00) (97 - 99)  BP: 142/95 (19 Nov 2024 05:00) (139/99 - 142/95)  BP(mean): --  RR: 17 (19 Nov 2024 05:00) (17 - 18)  SpO2: 100% (19 Nov 2024 05:00) (100% - 100%)    Parameters below as of 19 Nov 2024 05:00  Patient On (Oxygen Delivery Method): room air        CONSTITUTIONAL: NAD; well-developed  HEENT: PERRL, clear conjunctiva  RESPIRATORY: Normal respiratory effort; lungs are clear to auscultation bilaterally; No crackles/rhonchi/wheezing  CARDIOVASCULAR: Regular rate and rhythm, normal S1 and S2, no murmur/rub/gallop; No lower extremity edema; Peripheral pulses are 2+ bilaterally  ABDOMEN: Nontender to palpation, normoactive bowel sounds, no rebound/guarding; No hepatosplenomegaly  MUSCULOSKELETAL: No clubbing or cyanosis of digits; no joint swelling or tenderness to palpation  EXTREMITY: Lower extremities with +1 edema bilaterally; non-tender to palpation; non-erythematous B/L  NEURO: A&Ox3; no focal deficits   PSYCH: Normal mood; affect appropriate    LABS:                        8.9    3.96  )-----------( 191      ( 19 Nov 2024 05:48 )             27.4     11-18    138  |  105  |  11  ----------------------------<  102[H]  4.0   |  22  |  1.20    Ca    8.3[L]      18 Nov 2024 06:20  Phos  3.8     11-18  Mg     1.70     11-18    TPro  6.8  /  Alb  2.7[L]  /  TBili  0.3  /  DBili  x   /  AST  25  /  ALT  12  /  AlkPhos  49  11-18          Urinalysis Basic - ( 18 Nov 2024 06:20 )    Color: x / Appearance: x / SG: x / pH: x  Gluc: 102 mg/dL / Ketone: x  / Bili: x / Urobili: x   Blood: x / Protein: x / Nitrite: x   Leuk Esterase: x / RBC: x / WBC x   Sq Epi: x / Non Sq Epi: x / Bacteria: x          RADIOLOGY & ADDITIONAL TESTS:    N/A PROGRESS NOTE:     Patient is a 29y old  Female who presents with a chief complaint of Abdominal Pain, Fever (18 Nov 2024 16:55)      SUBJECTIVE / OVERNIGHT EVENTS:    OVERNIGHT: No acute overnight events.    Patient seen and examined at bedside this morning. She endorses dry cough and swelling of the bilateral feet and ankles, as well as intermittent nausea over the past couple days. She also reports new nonpainful nonpruritic erythematous macules on bilateral feet and calves. Denies abdominal or pelvic pain, fever, chills, vomiting, chest pain, SOB, sore throat, sore throat, rhinorrhea, dysuria, urinary frequency, changes in bowel movements, or paresthesia.     ROS negative except as noted in HPI.        MEDICATIONS  (STANDING):  cefTRIAXone   IVPB 1000 milliGRAM(s) IV Intermittent every 24 hours  doxycycline IVPB 100 milliGRAM(s) IV Intermittent every 12 hours  enoxaparin Injectable 40 milliGRAM(s) SubCutaneous every 24 hours  fluticasone propionate 50 MICROgram(s)/spray Nasal Spray 2 Spray(s) Both Nostrils two times a day  influenza   Vaccine 0.5 milliLiter(s) IntraMuscular once  metoprolol succinate  milliGRAM(s) Oral daily  metroNIDAZOLE  IVPB 500 milliGRAM(s) IV Intermittent every 12 hours  metroNIDAZOLE  IVPB        MEDICATIONS  (PRN):  acetaminophen     Tablet .. 650 milliGRAM(s) Oral every 6 hours PRN Temp greater or equal to 38C (100.4F), Mild Pain (1 - 3)  aluminum hydroxide/magnesium hydroxide/simethicone Suspension 30 milliLiter(s) Oral every 4 hours PRN Dyspepsia  guaiFENesin Oral Liquid (Sugar-Free) 100 milliGRAM(s) Oral once PRN Cough  melatonin 3 milliGRAM(s) Oral at bedtime PRN Insomnia      CAPILLARY BLOOD GLUCOSE        I&O's Summary      PHYSICAL EXAM:  Vital Signs Last 24 Hrs  T(C): 37.1 (19 Nov 2024 05:00), Max: 37.1 (18 Nov 2024 12:20)  T(F): 98.8 (19 Nov 2024 05:00), Max: 98.8 (19 Nov 2024 05:00)  HR: 98 (19 Nov 2024 05:00) (97 - 99)  BP: 142/95 (19 Nov 2024 05:00) (139/99 - 142/95)  BP(mean): --  RR: 17 (19 Nov 2024 05:00) (17 - 18)  SpO2: 100% (19 Nov 2024 05:00) (100% - 100%)    Parameters below as of 19 Nov 2024 05:00  Patient On (Oxygen Delivery Method): room air        CONSTITUTIONAL: NAD; well-developed  HEENT: PERRL, clear conjunctiva  RESPIRATORY: Normal respiratory effort; lungs are clear to auscultation bilaterally; No crackles/rhonchi/wheezing  CARDIOVASCULAR: Regular rate and rhythm, normal S1 and S2, no murmur/rub/gallop  ABDOMEN: Nontender to palpation, no rebound/guarding  MUSCULOSKELETAL: No clubbing or cyanosis of digits; no joint swelling or tenderness to palpation  EXTREMITY: Lower extremities with +1 edema bilaterally; non-tender to palpation. Several nonpainful erythematous macules noted on bilateral feet and calves  NEURO: A&Ox3; no focal deficits   PSYCH: Normal mood; affect appropriate    LABS:                        8.9    3.96  )-----------( 191      ( 19 Nov 2024 05:48 )             27.4     11-18    138  |  105  |  11  ----------------------------<  102[H]  4.0   |  22  |  1.20    Ca    8.3[L]      18 Nov 2024 06:20  Phos  3.8     11-18  Mg     1.70     11-18    TPro  6.8  /  Alb  2.7[L]  /  TBili  0.3  /  DBili  x   /  AST  25  /  ALT  12  /  AlkPhos  49  11-18          Urinalysis Basic - ( 18 Nov 2024 06:20 )    Color: x / Appearance: x / SG: x / pH: x  Gluc: 102 mg/dL / Ketone: x  / Bili: x / Urobili: x   Blood: x / Protein: x / Nitrite: x   Leuk Esterase: x / RBC: x / WBC x   Sq Epi: x / Non Sq Epi: x / Bacteria: x          RADIOLOGY & ADDITIONAL TESTS:    N/A

## 2024-11-19 NOTE — PROGRESS NOTE ADULT - ASSESSMENT
29F with PHM HTN presenting with lower abdominal pain, fever and chills for the past 2 weeks. evaluated by GYN at the ED, working ddx GYN vs  vs perisplenic ascites, Started on CTX/Doxy/Flagyl (11/15-).      Relevant hx:  - s/p Cefpodoxime from suspected UTI from [facility],   - represented to [facility] 11/10 for similar symptoms CT PE showed perisplenic ascites and b/l axillary lymphadenopathy    29F with PMH HTN presenting with lower abdominal pain, fever and chills for the past 2 weeks. evaluated by GYN at the ED, working ddx GYN vs  vs perisplenic ascites, Started on CTX/Doxy/Flagyl (11/15-).      Relevant hx:  - s/p Cefpodoxime from suspected UTI from [facility],   - represented to [facility] 11/10 for similar symptoms CT PE showed perisplenic ascites and b/l axillary lymphadenopathy    29F with PMH of HTN presenting with lower abdominal pain, fever and chills for the past 2 weeks. Evaluated by GYN at the ED, working ddx GYN vs  vs perisplenic ascites, Started on CTX/Doxy/Flagyl (11/15-).      Relevant hx:  - s/p Cefpodoxime from suspected UTI from [facility],   - re-presented to [facility] 11/10 for similar symptoms; CT PE showed perisplenic ascites and b/l axillary lymphadenopathy

## 2024-11-20 LAB
ALBUMIN SERPL ELPH-MCNC: 2.8 G/DL — LOW (ref 3.3–5)
ALP SERPL-CCNC: 52 U/L — SIGNIFICANT CHANGE UP (ref 40–120)
ALT FLD-CCNC: 18 U/L — SIGNIFICANT CHANGE UP (ref 4–33)
ANA PAT FLD IF-IMP: ABNORMAL
ANA TITR SER: ABNORMAL
ANION GAP SERPL CALC-SCNC: 9 MMOL/L — SIGNIFICANT CHANGE UP (ref 7–14)
ANTI-RIBONUCLEAR PROTEIN: 6.7 AI — HIGH
AST SERPL-CCNC: 55 U/L — HIGH (ref 4–32)
BILIRUB SERPL-MCNC: 0.4 MG/DL — SIGNIFICANT CHANGE UP (ref 0.2–1.2)
BUN SERPL-MCNC: 10 MG/DL — SIGNIFICANT CHANGE UP (ref 7–23)
C TRACH RRNA SPEC QL NAA+PROBE: SIGNIFICANT CHANGE UP
CALCIUM SERPL-MCNC: 8.6 MG/DL — SIGNIFICANT CHANGE UP (ref 8.4–10.5)
CHLORIDE SERPL-SCNC: 105 MMOL/L — SIGNIFICANT CHANGE UP (ref 98–107)
CO2 SERPL-SCNC: 22 MMOL/L — SIGNIFICANT CHANGE UP (ref 22–31)
COLLECT DURATION TIME UR: 24 HR — SIGNIFICANT CHANGE UP
CREAT SERPL-MCNC: 1.31 MG/DL — HIGH (ref 0.5–1.3)
DSDNA AB SER-ACNC: >300 IU/ML — HIGH
EGFR: 56 ML/MIN/1.73M2 — LOW
GLUCOSE SERPL-MCNC: 104 MG/DL — HIGH (ref 70–99)
HCT VFR BLD CALC: 26 % — LOW (ref 34.5–45)
HGB BLD-MCNC: 8.5 G/DL — LOW (ref 11.5–15.5)
MAGNESIUM SERPL-MCNC: 1.6 MG/DL — SIGNIFICANT CHANGE UP (ref 1.6–2.6)
MCHC RBC-ENTMCNC: 31.4 PG — SIGNIFICANT CHANGE UP (ref 27–34)
MCHC RBC-ENTMCNC: 32.7 G/DL — SIGNIFICANT CHANGE UP (ref 32–36)
MCV RBC AUTO: 95.9 FL — SIGNIFICANT CHANGE UP (ref 80–100)
N GONORRHOEA RRNA SPEC QL NAA+PROBE: SIGNIFICANT CHANGE UP
NRBC # BLD: 0 /100 WBCS — SIGNIFICANT CHANGE UP (ref 0–0)
NRBC # FLD: 0 K/UL — SIGNIFICANT CHANGE UP (ref 0–0)
PHOSPHATE SERPL-MCNC: 3.9 MG/DL — SIGNIFICANT CHANGE UP (ref 2.5–4.5)
PLATELET # BLD AUTO: 182 K/UL — SIGNIFICANT CHANGE UP (ref 150–400)
POTASSIUM SERPL-MCNC: 4.3 MMOL/L — SIGNIFICANT CHANGE UP (ref 3.5–5.3)
POTASSIUM SERPL-SCNC: 4.3 MMOL/L — SIGNIFICANT CHANGE UP (ref 3.5–5.3)
PROT 24H UR-MRATE: 3576 MG/24 H — HIGH
PROT SERPL-MCNC: 7 G/DL — SIGNIFICANT CHANGE UP (ref 6–8.3)
RBC # BLD: 2.71 M/UL — LOW (ref 3.8–5.2)
RBC # FLD: 13 % — SIGNIFICANT CHANGE UP (ref 10.3–14.5)
SODIUM SERPL-SCNC: 136 MMOL/L — SIGNIFICANT CHANGE UP (ref 135–145)
TOTAL VOLUME - 24 HOUR: 2300 ML — SIGNIFICANT CHANGE UP
URINE CREATININE CALCULATION: 1.4 G/24 H — SIGNIFICANT CHANGE UP (ref 0.6–1.6)
WBC # BLD: 3.8 K/UL — SIGNIFICANT CHANGE UP (ref 3.8–10.5)
WBC # FLD AUTO: 3.8 K/UL — SIGNIFICANT CHANGE UP (ref 3.8–10.5)

## 2024-11-20 PROCEDURE — 99232 SBSQ HOSP IP/OBS MODERATE 35: CPT

## 2024-11-20 PROCEDURE — 99223 1ST HOSP IP/OBS HIGH 75: CPT | Mod: GC

## 2024-11-20 RX ORDER — ONDANSETRON HYDROCHLORIDE 4 MG/1
4 TABLET, FILM COATED ORAL ONCE
Refills: 0 | Status: COMPLETED | OUTPATIENT
Start: 2024-11-20 | End: 2024-11-20

## 2024-11-20 RX ORDER — CETIRIZINE HYDROCHLORIDE 10 MG/1
10 TABLET ORAL DAILY
Refills: 0 | Status: DISCONTINUED | OUTPATIENT
Start: 2024-11-20 | End: 2024-11-22

## 2024-11-20 RX ADMIN — METRONIDAZOLE 100 MILLIGRAM(S): 500 TABLET ORAL at 18:20

## 2024-11-20 RX ADMIN — METOPROLOL TARTRATE 100 MILLIGRAM(S): 100 TABLET, FILM COATED ORAL at 05:20

## 2024-11-20 RX ADMIN — METRONIDAZOLE 100 MILLIGRAM(S): 500 TABLET ORAL at 06:21

## 2024-11-20 RX ADMIN — Medication 1 TABLET(S): at 12:08

## 2024-11-20 RX ADMIN — Medication 100 MILLIGRAM(S): at 06:20

## 2024-11-20 RX ADMIN — DOXYCYCLINE HYCLATE 100 MILLIGRAM(S): 150 TABLET, COATED ORAL at 18:24

## 2024-11-20 RX ADMIN — CETIRIZINE HYDROCHLORIDE 10 MILLIGRAM(S): 10 TABLET ORAL at 12:08

## 2024-11-20 RX ADMIN — DOXYCYCLINE HYCLATE 100 MILLIGRAM(S): 150 TABLET, COATED ORAL at 05:20

## 2024-11-20 NOTE — PROGRESS NOTE ADULT - PROBLEM SELECTOR PLAN 7
11/10 proteinuria > 1000  11/15 proteinuria > 500  a1c 5.4; Pr/Cr 2, normal Cr  ddx transient proteinuria, orthostatic proteinuria vs GN     Plan:   - outpatient nephrology follow up; has appointment for 11/19/24  - 24 hr urine collection ordered 11/19 as above

## 2024-11-20 NOTE — PROGRESS NOTE ADULT - PROBLEM SELECTOR PLAN 7
11/10 proteinuria > 1000  11/15 proteinuria > 500  a1c 5.4; Pr/Cr 2, normal Cr  ddx transient proteinuria, orthostatic proteinuria vs GN     Plan:   - outpatient nephrology follow up; has appointment for 11/19/24  - 24 hr urine collection ordered 11/19 as above 11/10 proteinuria > 1000  11/15 proteinuria > 500  a1c 5.4; Pr/Cr 2, normal Cr  ddx transient proteinuria, orthostatic proteinuria vs GN     Plan:   - outpatient nephrology follow up; has appointment for 11/19/24  - 24 hr urine collection as above No

## 2024-11-20 NOTE — PROGRESS NOTE ADULT - PROBLEM SELECTOR PLAN 1
normal wbc clinically unknown source: GYN vs  vs perisplenic loculated ascites  UTI s/p cefpodoxime 200mg BID treatment at home for UTI, last dose today,   11/10 [facility] Urine culture showed normal urogenital oanh  11/10 CT/PE bl axillary lymphadenopathy   11/15 CT abdomen reveals partially loculated perisplenic fluid. Multiple uterine masses likely representing fibroids. Nonspecific soft tissue nodules or lymph nodes in RLQ and level of aortic bifurcation.  11/17 -ve HIV, GC, trichomonas, syphilis, blood clx NGTD, uclx wnl    Plan:  - f/u C3, C4, ANCA, dsDNA, anti-RNP, anti-SSA workup iso possible autoimmune/inflammatory etiology of fever  - f/u 24hr urine collection, UA+microscopy for proteinuria, casts  - f/u Chlamydia  - f/u procal  - empirical PID coverage/: CTX, Doxy 100mg BID 14d, flagyl 500mg BID 14d if no other source per GYN.   - ID recs appreciated; rec c/w current regimen. suspect +EBV, remote infection (not acute).  - IR consulted for perisplenic loculated ascites: will consider CT drainage vs US guided drainage. US Abd 11/18 shows small volume perisplenic ascites, unchanged from 11/15. normal wbc clinically unknown source: GYN vs  vs perisplenic loculated ascites  UTI s/p cefpodoxime 200mg BID treatment at home for UTI, last dose today,   11/10 [facility] Urine culture showed normal urogenital oanh  11/10 CT/PE bl axillary lymphadenopathy   11/15 CT abdomen reveals partially loculated perisplenic fluid. Multiple uterine masses likely representing fibroids. Nonspecific soft tissue nodules or lymph nodes in RLQ and level of aortic bifurcation.  11/17 -ve HIV, GC, chlamydia, trichomonas, syphilis, blood clx NGTD, uclx wnl  11/20 low C3 and C4, positive dsDNA and anti-RNP. UA with proteinuria and hematuria, trace LE, neg nitrites and bacteria. procal 0.12.     Plan:  - rheum consulted to evaluate for SLE  - f/u 24hr urine collection  - empirical PID coverage/: CTX, Doxy 100mg BID 14d, flagyl 500mg BID 14d if no other source per GYN.   - ID recs appreciated; rec c/w current regimen. suspect +EBV, remote infection (not acute).  - IR consulted for perisplenic loculated ascites: will consider CT drainage vs US guided drainage. US Abd 11/18 shows small volume perisplenic ascites, unchanged from 11/15.

## 2024-11-20 NOTE — PROGRESS NOTE ADULT - PROBLEM SELECTOR PLAN 1
normal wbc clinically unknown source: GYN vs  vs perisplenic loculated ascites  UTI s/p cefpodoxime 200mg BID treatment at home for UTI, last dose today,   11/10 [facility] Urine culture showed normal urogenital oanh  11/10 CT/PE bl axillary lymphadenopathy   11/15 CT abdomen reveals partially loculated perisplenic fluid. Multiple uterine masses likely representing fibroids. Nonspecific soft tissue nodules or lymph nodes in RLQ and level of aortic bifurcation.  11/17 -ve HIV, GC, trichomonas, syphilis, blood clx NGTD, uclx wnl    Plan:  - f/u C3, C4, ANCA, dsDNA, anti-RNP, anti-SSA workup iso possible autoimmune/inflammatory etiology of fever  - f/u 24hr urine collection, UA+microscopy for proteinuria, casts  - f/u Chlamydia  - f/u procal  - empirical PID coverage/: CTX, Doxy 100mg BID 14d, flagyl 500mg BID 14d if no other source per GYN.   - ID recs appreciated; rec c/w current regimen. suspect +EBV, remote infection (not acute).  - IR consulted for perisplenic loculated ascites: will consider CT drainage vs US guided drainage. US Abd 11/18 shows small volume perisplenic ascites, unchanged from 11/15.

## 2024-11-20 NOTE — PROGRESS NOTE ADULT - ASSESSMENT
29F with PMH of HTN presenting with lower abdominal pain, fever and chills for the past 2 weeks. Evaluated by GYN at the ED, working ddx GYN vs  vs perisplenic ascites, Started on CTX/Doxy/Flagyl (11/15-).      Relevant hx:  - s/p Cefpodoxime from suspected UTI from [facility],   - re-presented to [facility] 11/10 for similar symptoms; CT PE showed perisplenic ascites and b/l axillary lymphadenopathy

## 2024-11-20 NOTE — CHART NOTE - NSCHARTNOTEFT_GEN_A_CORE
Nephrology was consulted for ?Lupus nephritis. Pt has hematuria and proteinuria. But when our team went to see the patient, pt's mother was on phone. Pt and her mother wanted to follow up with her out patient nephrology.   We explained the patient and her mother that patient is likely in lupus flare with renal involvement and she might need renal biopsy and further management as soon as possible.   They verbalized the understanding and still wanted to follow up with the nephrologist as out pt for further management.     Please reconsult, if patient agrees to pursue further management here as in patient.

## 2024-11-20 NOTE — PROGRESS NOTE ADULT - PROBLEM SELECTOR PLAN 5
repeat UA this admission, likely contaminated.     Plan:  - abx as above  -  urine cx <10K CFU repeat UA 11/19 with proteinuria and hematuria, +WBCs, trace LE, neg nitrites, bilirubin, and bacteria    Plan:  - abx as above  -  urine cx <10K CFU

## 2024-11-20 NOTE — CONSULT NOTE ADULT - ATTENDING COMMENTS
Patient seen and examined at bedside. Agree with assessment and plan as above. Patient with clinical findings consistent with lupus nephritis. While the patient and her mother have been insistent on following up outpatient with rheumatology and nephrology, we urged the importance and timeliness of renal biopsy to facilitate diagnosis confirmation and formulating a treatment plan. We also impressed upon her that it is best for her to avoid unexpected delays in trying to set all her appointment up on an outpatient basis. We will request IR to expedite her renal biopsy if possible, prior to discharge. Will avoid steroids for now till she is being treated for perisplenic abscess.

## 2024-11-20 NOTE — CONSULT NOTE ADULT - ASSESSMENT
29F PMH HTN presenting with LE abdominal pain and fevers, found to have loculated perisplenic fluid L paracolic gutter. Rheumatology consulted to evaluate for SLE.    #SLE  -Pt states she was diagnosed with sjogrens by Dr Ferris, last seen about 3 yrs ago, was having mild b/l knee pain, no other symptoms  -Currently no alopecia, oral ulcers, sicca symptoms, raynauds, arthritis, and no previous hx of DVT/PE/miscarriage  -Found to have dsDNA >300, low complements, RNP positive (67), with anemia and UA with proteinuria and hematuria (UPCR 2.0). Anemia is not hemolytic, normal hapto and bili    Note incomplete 29F PMH HTN presenting with LE abdominal pain and fevers, found to have loculated perisplenic fluid L paracolic gutter. Rheumatology consulted to evaluate for SLE.    #SLE  -Pt states she was diagnosed with sjogrens by Dr Ferris, last seen about 3 yrs ago, was having mild b/l knee pain at the time, no other symptoms  -Currently no alopecia, oral ulcers, sicca symptoms, raynauds, arthritis, and no previous hx of DVT/PE/miscarriage  -Found to have dsDNA >300, low complements, RNP positive (67), with anemia and UA with proteinuria and hematuria (UPCR 2.0). Anemia is not hemolytic, normal hapto and bili  -Given serologies and UA with proteinuria and hematuria, high suspicion for SLE and LN. Meets 16 points on EULAR ACR Lupus criteria. Loculated perisplenic fluid is likely unrelated to SLE, it would be unusual for loculated fluid from autoimmune causes.     Recommendations:  -Please obtain sjogrens ab, lupus anticoagulant, beta2 glycoprotein, anticardiolipin, [place], hepatitis panel, hep B core total ab, quant gold, G6PD  -Discussed need for renal biopsy with pt and pt's mother, they would prefer to see outpt rheumatologist and nephrologist, and to schedule IR renal bx outpt. Discussed that obtaining biopsies are more streamlined in the hospital, and that we would want to treat as soon as possible pending bx results. Pt and mother verbalized understanding. They would like to see how quickly IR bx can be done inpt vs outpt and decide  -Please consult IR for renal bx    Case discussed with Dr. Reyes Plata MD  Rheumatology Fellow

## 2024-11-20 NOTE — PROGRESS NOTE ADULT - ATTENDING COMMENTS
I have personally seen and examined patient on the above date. I discussed the case with resident and I agree with the findings and plan as detailed per note above, which I have amended where appropriate.    Ms. Garza is a 30 y/o F with PMH of HTN who presented to [facility] with ongoing worsening lower abd pain, fevers, chills for the past 2 weeks, previously seen in [facility] ED on 11/5/24, found with UTI, discharged on cefpodoxime, presented back to [facility] ED on 11/10 with similar sx and discharged from ED and now admitted due to persistent nature of the abdominal pain for further evaluation.     Pt seen and examined at bedside this morning. Abdominal pain resolved. Tolerating diet. EBV serologies reviewed, d/w ID and report it is remote infection and to c/w current abx therapy. No cp, sob, fevers, chills, abd pain, melena/hematochezia. +BM. Labs and vitals reviewed.    #SIRS + on admission   - Fever, tachycardic on admission. No clear source of infection   - Afebrile x 3days  - CT AP notable for: loculated perisplenic fluid along with L paracolic gutter. Potential IR drainage scheduled 11/22  - monitor fever curve  - Pt developed RLE rash, f/u rheum serology. However, rash already improving    #Lower abd pain   #Fibroids   - Improved   - CT AP notable for: loculated perisplenic fluid along with L paracolic gutter, multiple heterogenous uterine masses likely fibroids, nonspecific soft tissue nodules/lymph nodes in RLQ and at the level of aortic bifurcation    - TVUS on 11/15 -> negative for torsion, fibroid uterus, 1.1cm echogenic L ovarian torsion may represent a hemorrhagic cyst or corpus albicans   - GYN consulted in ED, TVUS as above, rec tx for possible PID  - UA on 11/10 notable for: cloudy, orange urine, trace LE, +bacteria, WBC 17, +blood, CFU <10K. RVP negative.   - ID recs appreciated   - c/w CTX/Doxycycline/flagyl. EBV labs reviewed and per ID, suspect remote infection. syphilis screen negative   - F.u BCX = NGTD thus far   - HIV negative   - GC chlamydia negative        #HTN  - c/w home metoprolol for now, monitor vitals closely    #SILVESTRE   - resolved   - monitor     #Proteinuria  - noted to have proteinuria previously, urine protein      Rest of plan as above.

## 2024-11-20 NOTE — PROGRESS NOTE ADULT - ASSESSMENT
29F with PMH of HTN presenting with lower abdominal pain, fever and chills for the past 2 weeks. Evaluated by GYN at the ED, working ddx GYN vs  vs perisplenic ascites, Started on CTX/Doxy/Flagyl (11/15-).      Relevant hx:  - s/p Cefpodoxime from suspected UTI from [facility],   - re-presented to [facility] 11/10 for similar symptoms; CT PE showed perisplenic ascites and b/l axillary lymphadenopathy    29F with PMH of HTN, Sjogren's presenting with lower abdominal pain, fever and chills for the past 2 weeks. Evaluated by GYN at the ED, working ddx GYN vs  vs perisplenic ascites, Started on CTX/Doxy/Flagyl (11/15-).      Relevant hx:  - s/p Cefpodoxime from suspected UTI from [facility],   - re-presented to [facility] 11/10 for similar symptoms; CT PE showed perisplenic ascites and b/l axillary lymphadenopathy

## 2024-11-20 NOTE — PROGRESS NOTE ADULT - PROBLEM SELECTOR PLAN 2
Differential includes GYN etiology ruptured ovarian cyst vs PMD/fibroid vs. endometriosis vs infectious causes PID vs  pyelonephritis  BHCG -ve  11/15 CT abdomen reveals partially loculated perisplenic fluid. Multiple uterine masses likely representing fibroids. Nonspecific soft tissue nodules or lymph nodes in RLQ and level of aortic bifurcation.  Patient was seen by Ob/gyn in ED, low clinical suspicion for PID at this time given no CMT.  11/15 TVUS -ve for torsion; > 1.1 cm echogenic left ovarian lesion may represent a hemorrhagic cyst or corpus albicans. Fibroid uterus.      Plan   - resolved at present  - workup as above  - Gyn consulted on admission, no acute intervention Differential includes GYN etiology ruptured ovarian cyst vs PMD/fibroid vs. endometriosis vs infectious causes PID vs  pyelonephritis  BHCG -ve  11/15 CT abdomen reveals partially loculated perisplenic fluid. Multiple uterine masses likely representing fibroids. Nonspecific soft tissue nodules or lymph nodes in RLQ and level of aortic bifurcation.  Patient was seen by Ob/gyn in ED, low clinical suspicion for PID at this time given no CMT.  11/15 TVUS -ve for torsion; > 1.1 cm echogenic left ovarian lesion may represent a hemorrhagic cyst or corpus albicans. Fibroid uterus.  11/19 MR pelvis: small enhancing lesion anterior to left ovary representing adnexal/ovarian mass vs involuting corpus luteum.     Plan   - f/u pelvic MRI in 2 months to reassess  - workup as above  - Gyn consulted on admission, no acute intervention

## 2024-11-20 NOTE — PROGRESS NOTE ADULT - PROBLEM SELECTOR PLAN 4
Hgb 8.9  - 11/18 ferritin 688 high, total Fe 47 normal, TIBC 139 low - consistent with chronic inflammation/anemia of chronic disease picture  - 11/18 serum B12 and folate in normal range  - 11/18 LDH high; normal haptoglobin, reticulocyte%, and absolute retics. No nucleated RBCs. Hemolysis unlikely  - reports menstrual period about to come, not actively menstruating.    Plan:  - f/u autoimmune/inflammatory workup as above

## 2024-11-20 NOTE — PROGRESS NOTE ADULT - PROBLEM SELECTOR PLAN 2
Differential includes GYN etiology ruptured ovarian cyst vs PMD/fibroid vs. endometriosis vs infectious causes PID vs  pyelonephritis  BHCG -ve  11/15 CT abdomen reveals partially loculated perisplenic fluid. Multiple uterine masses likely representing fibroids. Nonspecific soft tissue nodules or lymph nodes in RLQ and level of aortic bifurcation.  Patient was seen by Ob/gyn in ED, low clinical suspicion for PID at this time given no CMT.  11/15 TVUS -ve for torsion; > 1.1 cm echogenic left ovarian lesion may represent a hemorrhagic cyst or corpus albicans. Fibroid uterus.      Plan   - resolved at present  - workup as above  - Gyn consulted on admission, no acute intervention

## 2024-11-20 NOTE — PROGRESS NOTE ADULT - ASSESSMENT
29 y.o. female with PHM HTN presented 11/15 with lower abdominal pain, fever and chills for the past 2 weeks    Assessment:  #Fever  #Abdominal pain  -UTI s/p cefpodoxime 200mg BID treatment at home for UTI  Persistent low grade fever despite antibiotics   -UA+, BCX no growth   -HIV neg   -CT A/P Partially loculated perisplenic fluid tracking along the left paracolic gutter.  Multiple heterogeneous uterine masses likely representing fibroids. Questionable masslike hypoattenuation of the cervical region, which may represent normal appearance of the cervix, however an underlying mass cannot be excluded.  Nonspecific soft tissue nodules or lymph nodes in the right lower   quadrant and at the level of the aortic bifurcation.  -Transvaginal US: No sonographic evidence of ovarian torsion. Fibroid uterus. A 1.1 cm echogenic left ovarian lesion may represent a hemorrhagic cyst or corpus albicans.      -fever likely 2/2  source     - IR evaluating for aspiration     planning MRI to help assess    - EBV serology consistent with remote infection not acute     Suggest;  -Cont Ceftriaxone, flagyl and doxy for now              29 y.o. female with PHM HTN presented 11/15 with lower abdominal pain, fever and chills for the past 2 weeks    Assessment:  #Fever  #Abdominal pain  -UTI s/p cefpodoxime 200mg BID treatment at home for UTI  Persistent low grade fever despite antibiotics   -UA+, BCX no growth   -HIV neg   -CT A/P Partially loculated perisplenic fluid tracking along the left paracolic gutter.  Multiple heterogeneous uterine masses likely representing fibroids. Questionable masslike hypoattenuation of the cervical region, which may represent normal appearance of the cervix, however an underlying mass cannot be excluded.  Nonspecific soft tissue nodules or lymph nodes in the right lower   quadrant and at the level of the aortic bifurcation.  -Transvaginal US: No sonographic evidence of ovarian torsion. Fibroid uterus. A 1.1 cm echogenic left ovarian lesion may represent a hemorrhagic cyst or corpus albicans.      -fever likely 2/2  source     - IR evaluating for aspiration         - EBV serology consistent with remote infection not acute     Suggest;  -Cont Ceftriaxone, flagyl and doxy for now

## 2024-11-20 NOTE — PROGRESS NOTE ADULT - SUBJECTIVE AND OBJECTIVE BOX
Patient is a 29y old  Female who presents with a chief complaint of Abdominal Pain, Fever (19 Nov 2024 15:59)      INTERVAL HPI/OVERNIGHT EVENTS:  - No acute events overnight  - Patient seen and examined at bedside this morning.         REVIEW OF SYSTEMS:  CONSTITUTIONAL:  No weakness, fevers, or chills  EYES/ENT:  No visual changes, vertigo, or throat pain   NECK:  No pain or stiffness  RESPIRATORY:  No SOB, cough, wheezing, or hemoptysis  CARDIOVASCULAR:  No chest pain or palpitations  GASTROINTESTINAL:  No abdominal pain, nausea, vomiting, or hematemesis; No diarrhea or constipation; No melena or hematochezia.  GENITOURINARY:  No dysuria, change in frequency, or hematuria  NEUROLOGICAL:  No numbness or weakness  SKIN:  No itching or rashes      FAMILY HISTORY:  Family history of systemic lupus erythematosus (Sibling)        Vital Signs Last 24 Hrs  T(C): 36.8 (20 Nov 2024 05:06), Max: 37.2 (19 Nov 2024 14:30)  T(F): 98.2 (20 Nov 2024 05:06), Max: 98.9 (19 Nov 2024 14:30)  HR: 95 (20 Nov 2024 05:06) (95 - 95)  BP: 141/98 (20 Nov 2024 05:06) (141/98 - 148/95)  BP(mean): --  RR: 17 (20 Nov 2024 05:06) (17 - 18)  SpO2: 100% (20 Nov 2024 05:06) (100% - 100%)    Parameters below as of 20 Nov 2024 05:06  Patient On (Oxygen Delivery Method): room air        No Known Allergies      PHYSICAL EXAM:  CONSTITUTIONAL: NAD; well-developed  HEENT: PERRL, clear conjunctiva  RESPIRATORY: Normal respiratory effort; lungs are clear to auscultation bilaterally; No crackles/rhonchi/wheezing  CARDIOVASCULAR: Regular rate and rhythm, normal S1 and S2, no murmur/rub/gallop  ABDOMEN: Nontender to palpation, no rebound/guarding  MUSCULOSKELETAL: No clubbing or cyanosis of digits; no joint swelling or tenderness to palpation  EXTREMITY: Lower extremities with +1 edema bilaterally; non-tender to palpation. Several nonpainful erythematous macules noted on bilateral feet and calves  NEURO: A&Ox3; no focal deficits   PSYCH: Normal mood; affect appropriate    Consultant(s) Notes Reviewed:  [x ] YES  [ ] NO  Care Discussed with Consultants/Other Providers [ x] YES  [ ] NO    LABS:        RADIOLOGY & ADDITIONAL TESTS:    Imaging Personally Reviewed:  [ ] YES  [ ] NO    acetaminophen     Tablet .. 650 milliGRAM(s) Oral every 6 hours PRN  aluminum hydroxide/magnesium hydroxide/simethicone Suspension 30 milliLiter(s) Oral every 4 hours PRN  cefTRIAXone   IVPB 1000 milliGRAM(s) IV Intermittent every 24 hours  doxycycline IVPB 100 milliGRAM(s) IV Intermittent every 12 hours  enoxaparin Injectable 40 milliGRAM(s) SubCutaneous every 24 hours  fluticasone propionate 50 MICROgram(s)/spray Nasal Spray 2 Spray(s) Both Nostrils two times a day  guaiFENesin Oral Liquid (Sugar-Free) 100 milliGRAM(s) Oral once PRN  influenza   Vaccine 0.5 milliLiter(s) IntraMuscular once  melatonin 3 milliGRAM(s) Oral at bedtime PRN  metoprolol succinate  milliGRAM(s) Oral daily  metroNIDAZOLE  IVPB 500 milliGRAM(s) IV Intermittent every 12 hours  metroNIDAZOLE  IVPB      multivitamin 1 Tablet(s) Oral daily      HEALTH ISSUES - PROBLEM Dx:  Bilateral lower abdominal pain    UTI (urinary tract infection)    HTN (hypertension)    Prophylactic measure    Generalized lymphadenopathy    Fever    Proteinuria    Normocytic anemia

## 2024-11-20 NOTE — PROGRESS NOTE ADULT - SUBJECTIVE AND OBJECTIVE BOX
PROGRESS NOTE:     Patient is a 29y old  Female who presents with a chief complaint of Abdominal Pain, Fever (2024 15:59)      SUBJECTIVE / OVERNIGHT EVENTS:    OVERNIGHT: No acute overnight events.      Patient was examined at bedside and feels well this morning. Denies fever, chills, chest pain, SOB, nausea, vomiting. ROS otherwise negative and pt is amenable to current treatment plan.      REVIEW OF SYSTEMS:    CONSTITUTIONAL:  No weakness, fevers, or chills  EYES/ENT:  No visual changes, vertigo, or throat pain   NECK:  No pain or stiffness  RESPIRATORY:  No SOB, cough, wheezing, or hemoptysis  CARDIOVASCULAR:  No chest pain or palpitations  GASTROINTESTINAL:  No abdominal pain, nausea, vomiting, or hematemesis; No diarrhea or constipation; No melena or hematochezia.  GENITOURINARY:  No dysuria, change in frequency, or hematuria  NEUROLOGICAL:  No numbness or weakness  SKIN:  No itching or rashes      MEDICATIONS  (STANDING):  cefTRIAXone   IVPB 1000 milliGRAM(s) IV Intermittent every 24 hours  doxycycline IVPB 100 milliGRAM(s) IV Intermittent every 12 hours  enoxaparin Injectable 40 milliGRAM(s) SubCutaneous every 24 hours  fluticasone propionate 50 MICROgram(s)/spray Nasal Spray 2 Spray(s) Both Nostrils two times a day  influenza   Vaccine 0.5 milliLiter(s) IntraMuscular once  metoprolol succinate  milliGRAM(s) Oral daily  metroNIDAZOLE  IVPB 500 milliGRAM(s) IV Intermittent every 12 hours  metroNIDAZOLE  IVPB      multivitamin 1 Tablet(s) Oral daily    MEDICATIONS  (PRN):  acetaminophen     Tablet .. 650 milliGRAM(s) Oral every 6 hours PRN Temp greater or equal to 38C (100.4F), Mild Pain (1 - 3)  aluminum hydroxide/magnesium hydroxide/simethicone Suspension 30 milliLiter(s) Oral every 4 hours PRN Dyspepsia  guaiFENesin Oral Liquid (Sugar-Free) 100 milliGRAM(s) Oral once PRN Cough  melatonin 3 milliGRAM(s) Oral at bedtime PRN Insomnia      CAPILLARY BLOOD GLUCOSE        I&O's Summary      PHYSICAL EXAM:  Vital Signs Last 24 Hrs  T(C): 36.8 (2024 05:06), Max: 37.2 (2024 14:30)  T(F): 98.2 (2024 05:06), Max: 98.9 (2024 14:30)  HR: 95 (2024 05:06) (95 - 95)  BP: 141/98 (2024 05:06) (141/98 - 148/95)  BP(mean): --  RR: 17 (2024 05:06) (17 - 18)  SpO2: 100% (2024 05:06) (100% - 100%)    Parameters below as of 2024 05:06  Patient On (Oxygen Delivery Method): room air        CONSTITUTIONAL: NAD; well-developed  HEENT: PERRL, clear conjunctiva  RESPIRATORY: Normal respiratory effort; lungs are clear to auscultation bilaterally; No crackles/rhonchi/wheezing  CARDIOVASCULAR: Regular rate and rhythm, normal S1 and S2, no murmur/rub/gallop; No lower extremity edema; Peripheral pulses are 2+ bilaterally  ABDOMEN: Nontender to palpation, normoactive bowel sounds, no rebound/guarding; No hepatosplenomegaly  MUSCULOSKELETAL: No clubbing or cyanosis of digits; no joint swelling or tenderness to palpation  EXTREMITY: Lower extremities non-tender to palpation; non-erythematous B/L  NEURO: A&Ox3; no focal deficits   PSYCH: Normal mood; affect appropriate    LABS:                        8.5    3.80  )-----------( 182      ( 2024 06:20 )             26.0     11-19    134[L]  |  102  |  9   ----------------------------<  108[H]  3.6   |  22  |  1.23    Ca    8.7      2024 05:48  Phos  3.6     11-19  Mg     1.60     11-19            Urinalysis Basic - ( 2024 16:06 )    Color: Dark Yellow / Appearance: Cloudy / S.013 / pH: x  Gluc: x / Ketone: Negative mg/dL  / Bili: Negative / Urobili: 1.0 mg/dL   Blood: x / Protein: 300 mg/dL / Nitrite: Negative   Leuk Esterase: Trace / RBC: 191 /HPF / WBC 10 /HPF   Sq Epi: x / Non Sq Epi: 6 /HPF / Bacteria: Negative /HPF          RADIOLOGY & ADDITIONAL TESTS:    N/A PROGRESS NOTE:     Patient is a 29y old  Female who presents with a chief complaint of Abdominal Pain, Fever (2024 15:59)      SUBJECTIVE / OVERNIGHT EVENTS:    OVERNIGHT: No acute overnight events.    Patient seen and examined at bedside this morning. She reports dry cough that has been constant for the past few weeks, unchanged lower extremity edema from yesterday, slight nausea last night, and dark brown urine since . Denies fever, chills, chest pain, SOB, vomiting, sore throat, rhinorrhea, dysuria, changes in bowel movements, weakness, or swollen joints.     ROS negative except as noted in HPI.         MEDICATIONS  (STANDING):  cefTRIAXone   IVPB 1000 milliGRAM(s) IV Intermittent every 24 hours  doxycycline IVPB 100 milliGRAM(s) IV Intermittent every 12 hours  enoxaparin Injectable 40 milliGRAM(s) SubCutaneous every 24 hours  fluticasone propionate 50 MICROgram(s)/spray Nasal Spray 2 Spray(s) Both Nostrils two times a day  influenza   Vaccine 0.5 milliLiter(s) IntraMuscular once  metoprolol succinate  milliGRAM(s) Oral daily  metroNIDAZOLE  IVPB 500 milliGRAM(s) IV Intermittent every 12 hours  metroNIDAZOLE  IVPB      multivitamin 1 Tablet(s) Oral daily    MEDICATIONS  (PRN):  acetaminophen     Tablet .. 650 milliGRAM(s) Oral every 6 hours PRN Temp greater or equal to 38C (100.4F), Mild Pain (1 - 3)  aluminum hydroxide/magnesium hydroxide/simethicone Suspension 30 milliLiter(s) Oral every 4 hours PRN Dyspepsia  guaiFENesin Oral Liquid (Sugar-Free) 100 milliGRAM(s) Oral once PRN Cough  melatonin 3 milliGRAM(s) Oral at bedtime PRN Insomnia      CAPILLARY BLOOD GLUCOSE        I&O's Summary      PHYSICAL EXAM:  Vital Signs Last 24 Hrs  T(C): 36.8 (2024 05:06), Max: 37.2 (2024 14:30)  T(F): 98.2 (2024 05:06), Max: 98.9 (2024 14:30)  HR: 95 (2024 05:06) (95 - 95)  BP: 141/98 (2024 05:06) (141/98 - 148/95)  BP(mean): --  RR: 17 (2024 05:06) (17 - 18)  SpO2: 100% (2024 05:06) (100% - 100%)    Parameters below as of 2024 05:06  Patient On (Oxygen Delivery Method): room air        CONSTITUTIONAL: NAD; well-developed  HEENT: PERRL, clear conjunctiva  RESPIRATORY: Normal respiratory effort; lungs are clear to auscultation bilaterally; No crackles/rhonchi/wheezing  CARDIOVASCULAR: Regular rate and rhythm, normal S1 and S2, no murmur/rub/gallop; +1 lower extremity edema; Peripheral pulses are 2+ bilaterally  ABDOMEN: Nontender to palpation, normoactive bowel sounds, no rebound/guarding  MUSCULOSKELETAL: No clubbing or cyanosis of digits; no joint swelling or tenderness to palpation  EXTREMITY: Macular erythematous lesions on the R foot and calf. Lower extremities non-tender to palpation.  NEURO: A&Ox3; no focal deficits   PSYCH: Normal mood; affect appropriate    LABS:                        8.5    3.80  )-----------( 182      ( 2024 06:20 )             26.0     11-19    134[L]  |  102  |  9   ----------------------------<  108[H]  3.6   |  22  |  1.23    Ca    8.7      2024 05:48  Phos  3.6     11-19  Mg     1.60     11-19            Urinalysis Basic - ( 2024 16:06 )    Color: Dark Yellow / Appearance: Cloudy / S.013 / pH: x  Gluc: x / Ketone: Negative mg/dL  / Bili: Negative / Urobili: 1.0 mg/dL   Blood: x / Protein: 300 mg/dL / Nitrite: Negative   Leuk Esterase: Trace / RBC: 191 /HPF / WBC 10 /HPF   Sq Epi: x / Non Sq Epi: 6 /HPF / Bacteria: Negative /HPF          RADIOLOGY & ADDITIONAL TESTS:    N/A

## 2024-11-20 NOTE — CONSULT NOTE ADULT - SUBJECTIVE AND OBJECTIVE BOX
ARGENIS SEEMA  [id number]    HISTORY OF PRESENT ILLNESS:  "29 y.o. female with PHM HTN presenting with lower abdominal pain, fever and chills for the past 2 weeks. Patient states she was seen in [place]'s ED on 11/5; was told she has a UTI, started on cefpodixime but pt is unsure if urine cx was sent. Patient was then seen at [facility] on 11/10/24 for similar sx- found to have elevated D Dimer, negative CTPE but ct a/p showed small amount of perisplenic fluid. Patient was recommended to f/u with GI outpatient. There was also concern for a possibly nephrology issue at that time due to protein >1000 in urine, patient was told to f/u outpatient with nephrology. Pt states she has a nephrology appointment on 11/19/24 but did not schedule the GI appointment as yet.     Today patient describes her abdominal pain as dull, cramps, waxing/waning, 3-4 /10 in severity, localized in lower abdomen b/l. No radiation. Patient states eating makes the pain worse. Pain is slightly alleviated with heating pad and tylenol. She states her pain has not worsened but she is concerned because her pain is still persistent for 2 weeks and has not resolved after finishing course of antibiotics. Patient endorses nausea and decr. po intake but denies vomiting, diarrhea, hematochezia, melena. Denies dysuria, flank pain, vaginal bleeding, vaginal discharge. Patient also endorses fevers (Tmax 101 F, 2 days ago), chills, generalized weakness. Patient states she is sexually active, states she does not regularly use protection. Denies hx. STI. Additionally patient states her BP has been elevated, at home it was 151/103. She restarted her metoprolol 100mg daily yesterday after seeing PCP, prior she had not been on any BP medication for months. Endorses SOB. Denies CP, syncopal episodes, lightheadedness."    Rheum ROS    Denies fevers/chills/weight loss/night sweats/alopecia/sinus disease/asthma history/oral ulcers/dysphagia/vision changes/Raynauds/VTE/miscarraiges/sicca symptoms/urinary changes/edema/SOB/joint pain/swelling/jaw claudication/rash/photosensitivity/morning stiffness    Endorses fevers/chills/weight loss/night sweats/alopecia/sinus disease/asthma history/oral ulcers/dysphagia/vision changes/Raynauds/VTE/miscarraiges/sicca symptoms/urinary changes/edema/SOB/joint pain/swelling/jaw claudication/rash/photosensitivity/morning stiffness      MEDICATIONS  (STANDING):  cefTRIAXone   IVPB 1000 milliGRAM(s) IV Intermittent every 24 hours  cetirizine 10 milliGRAM(s) Oral daily  doxycycline IVPB 100 milliGRAM(s) IV Intermittent every 12 hours  enoxaparin Injectable 40 milliGRAM(s) SubCutaneous every 24 hours  fluticasone propionate 50 MICROgram(s)/spray Nasal Spray 2 Spray(s) Both Nostrils two times a day  influenza   Vaccine 0.5 milliLiter(s) IntraMuscular once  metoprolol succinate  milliGRAM(s) Oral daily  metroNIDAZOLE  IVPB 500 milliGRAM(s) IV Intermittent every 12 hours  metroNIDAZOLE  IVPB      multivitamin 1 Tablet(s) Oral daily    MEDICATIONS  (PRN):  acetaminophen     Tablet .. 650 milliGRAM(s) Oral every 6 hours PRN Temp greater or equal to 38C (100.4F), Mild Pain (1 - 3)  aluminum hydroxide/magnesium hydroxide/simethicone Suspension 30 milliLiter(s) Oral every 4 hours PRN Dyspepsia  guaiFENesin Oral Liquid (Sugar-Free) 100 milliGRAM(s) Oral once PRN Cough  melatonin 3 milliGRAM(s) Oral at bedtime PRN Insomnia      Allergies    No Known Allergies    Intolerances        PERTINENT MEDICATION HISTORY:    SOCIAL HISTORY:  OCCUPATION:  TRAVEL HISTORY:    FAMILY HISTORY:  Family history of systemic lupus erythematosus (Sibling)        Vital Signs Last 24 Hrs  T(C): 36.7 (20 Nov 2024 14:10), Max: 36.9 (19 Nov 2024 21:26)  T(F): 98.1 (20 Nov 2024 14:10), Max: 98.4 (19 Nov 2024 21:26)  HR: 100 (20 Nov 2024 14:10) (95 - 100)  BP: 127/91 (20 Nov 2024 14:10) (127/91 - 145/97)  BP(mean): --  RR: 18 (20 Nov 2024 14:10) (17 - 18)  SpO2: 99% (20 Nov 2024 14:10) (99% - 100%)    Parameters below as of 20 Nov 2024 14:10  Patient On (Oxygen Delivery Method): room air        ROS as noted above     Physical Exam:  General: No apparent distress  HEENT: EOMI, MMM  CVS: +S1/S2, RRR, no murmurs/rubs/gallops  Resp: CTA b/l. No crackles/wheezing  GI: Soft, NT/ND +BS  MSK: no swelling/warmth/erythema of the joints of the UE/LE  Neuro: AAOx3  Skin: no visible rashes    LABS:                        8.5    3.80  )-----------( 182      ( 20 Nov 2024 06:20 )             26.0     11-20    136  |  105  |  10  ----------------------------<  104[H]  4.3   |  22  |  1.31[H]    Ca    8.6      20 Nov 2024 06:20  Phos  3.9     11-20  Mg     1.60     11-20    TPro  7.0  /  Alb  2.8[L]  /  TBili  0.4  /  DBili  x   /  AST  55[H]  /  ALT  18  /  AlkPhos  52  11-20      Urinalysis Basic - ( 20 Nov 2024 06:20 )    Color: x / Appearance: x / SG: x / pH: x  Gluc: 104 mg/dL / Ketone: x  / Bili: x / Urobili: x   Blood: x / Protein: x / Nitrite: x   Leuk Esterase: x / RBC: x / WBC x   Sq Epi: x / Non Sq Epi: x / Bacteria: x            Rheumatology Work Up    C3 Complement, Serum: 34 mg/dL *L* [90 - 180] (11-19-24 @ 13:01)  C4 Complement, Serum: <4 mg/dL *L* [10 - 40] (11-19-24 @ 13:01)  Double Stranded DNA Antibody: >300 IU/mL *H* [Result Interpretation  <= 4 IU/mL:     Negative  5 - 9 IU/mL:     Indeterminate  >= 10 IU/mL:   Positive  Method: Multiplexed flow immunoassay] (11-19-24 @ 12:56)  Protein/Creatinine Ratio Calculation: 2.0 Ratio *H* [0.0 - 0.2] (11-16-24 @ 14:00)            RADIOLOGY & ADDITIONAL STUDIES:    < from: CT Angio Chest PE Protocol w/ IV Cont (11.10.24 @ 04:52) >    IMPRESSION:  No pulmonary embolus. No acute finding in the chest.    Suspect a small amount of perisplenic ascites, less likely artifact.   Consider abdominal ultrasound to confirm/better evaluate.    Mild bilateral axillary lymphadenopathy. Suggest follow-up physical   examination to ensure this resolves.    < end of copied text >    < from: CT Abdomen and Pelvis w/ IV Cont (11.15.24 @ 18:07) >  IMPRESSION:  Partially loculated perisplenic fluid tracking along the left paracolic   gutter.    Multiple heterogeneous uterine masses likely representing fibroids.   Questionable masslike hypoattenuation of the cervical region, which may   represent normal appearance of the cervix, however an underlying mass   cannot be excluded. Recommend further evaluation with pelvic ultrasound   or nonemergent contrast-enhanced pelvic MRI.    Nonspecific soft tissue nodules or lymph nodes in the right lower   quadrant and at the level of the aortic bifurcation.    < end of copied text >    < from: MR Pelvis w/wo IV Cont (11.19.24 @ 21:15) >    IMPRESSION:  Multiple fibroids. Normal appearance of the cervix.  A small enhancing lesion anterior to the left ovary, uncertain if it is   part of the left ovary lower within the adnexa. This may represent an   adnexal/ovarian mass or a benign finding like an involuting corpus   luteum. A pelvic MRI within 2 months is advised to reassess this finding.    < end of copied text >   ARGENIS SEEMA  [id number]    HISTORY OF PRESENT ILLNESS:  "29 y.o. female with PHM HTN presenting with lower abdominal pain, fever and chills for the past 2 weeks. Patient states she was seen in [place]'s ED on 11/5; was told she has a UTI, started on cefpodixime but pt is unsure if urine cx was sent. Patient was then seen at [facility] on 11/10/24 for similar sx- found to have elevated D Dimer, negative CTPE but ct a/p showed small amount of perisplenic fluid. Patient was recommended to f/u with GI outpatient. There was also concern for a possibly nephrology issue at that time due to protein >1000 in urine, patient was told to f/u outpatient with nephrology. Pt states she has a nephrology appointment on 11/19/24 but did not schedule the GI appointment as yet.     Today patient describes her abdominal pain as dull, cramps, waxing/waning, 3-4 /10 in severity, localized in lower abdomen b/l. No radiation. Patient states eating makes the pain worse. Pain is slightly alleviated with heating pad and tylenol. She states her pain has not worsened but she is concerned because her pain is still persistent for 2 weeks and has not resolved after finishing course of antibiotics. Patient endorses nausea and decr. po intake but denies vomiting, diarrhea, hematochezia, melena. Denies dysuria, flank pain, vaginal bleeding, vaginal discharge. Patient also endorses fevers (Tmax 101 F, 2 days ago), chills, generalized weakness. Patient states she is sexually active, states she does not regularly use protection. Denies hx. STI. Additionally patient states her BP has been elevated, at home it was 151/103. She restarted her metoprolol 100mg daily yesterday after seeing PCP, prior she had not been on any BP medication for months. Endorses SOB. Denies CP, syncopal episodes, lightheadedness."    Pt states that she was diagnosed with sjogrens 3 yrs ago by rheumatologist Dr Ferris. At the time she had b/l knee pain. Given that only sjogrens labs were positive and knee pain were mild, no treatment was started. Pt was in good health until this acute episode, had hypertension but stopped taking meds and was managing with exercise. Started to have intermittent crampy lower abdominal pain, was treated for a UTI at [facility] ED with cefpodoxime 7 days. Still continued to have abdominal pain and fevers, which made her return to [facility]. Pt noted fevers to 100-101 at home. Has mild intermittent knee pain, none currently. No previous joint redness or swelling. Denies alopecia, oral uclers, dry eyes, dry mouth, raynauds, DVT/PE, miscarriage, previous pregnancies. Did not have anemia before, was told recently. Has regular periods with heavy flow first couple days. No photosensitivity. Noted linear skin lesions on b/l LE during hospitalization.    Rheum ROS  As above      MEDICATIONS  (STANDING):  cefTRIAXone   IVPB 1000 milliGRAM(s) IV Intermittent every 24 hours  cetirizine 10 milliGRAM(s) Oral daily  doxycycline IVPB 100 milliGRAM(s) IV Intermittent every 12 hours  enoxaparin Injectable 40 milliGRAM(s) SubCutaneous every 24 hours  fluticasone propionate 50 MICROgram(s)/spray Nasal Spray 2 Spray(s) Both Nostrils two times a day  influenza   Vaccine 0.5 milliLiter(s) IntraMuscular once  metoprolol succinate  milliGRAM(s) Oral daily  metroNIDAZOLE  IVPB 500 milliGRAM(s) IV Intermittent every 12 hours  metroNIDAZOLE  IVPB      multivitamin 1 Tablet(s) Oral daily    MEDICATIONS  (PRN):  acetaminophen     Tablet .. 650 milliGRAM(s) Oral every 6 hours PRN Temp greater or equal to 38C (100.4F), Mild Pain (1 - 3)  aluminum hydroxide/magnesium hydroxide/simethicone Suspension 30 milliLiter(s) Oral every 4 hours PRN Dyspepsia  guaiFENesin Oral Liquid (Sugar-Free) 100 milliGRAM(s) Oral once PRN Cough  melatonin 3 milliGRAM(s) Oral at bedtime PRN Insomnia      Allergies    No Known Allergies    Intolerances        PERTINENT MEDICATION HISTORY: none    SOCIAL HISTORY: no smoking, drinks alcohol occasionally  OCCUPATION: works as technician  TRAVEL HISTORY: none    FAMILY HISTORY: sister with SLE  Family history of systemic lupus erythematosus (Sibling)        Vital Signs Last 24 Hrs  T(C): 36.7 (20 Nov 2024 14:10), Max: 36.9 (19 Nov 2024 21:26)  T(F): 98.1 (20 Nov 2024 14:10), Max: 98.4 (19 Nov 2024 21:26)  HR: 100 (20 Nov 2024 14:10) (95 - 100)  BP: 127/91 (20 Nov 2024 14:10) (127/91 - 145/97)  BP(mean): --  RR: 18 (20 Nov 2024 14:10) (17 - 18)  SpO2: 99% (20 Nov 2024 14:10) (99% - 100%)    Parameters below as of 20 Nov 2024 14:10  Patient On (Oxygen Delivery Method): room air        ROS as noted above     Physical Exam:  General: No apparent distress  HEENT: EOMI, MMM  CVS: +S1/S2, RRR, no murmurs/rubs/gallops  Resp: CTA b/l. No crackles/wheezing  GI: Soft, NT/ND +BS  MSK: no swelling/warmth/erythema of the joints of the UE/LE  Neuro: AAOx3  Skin: linear hyperpigmented lesions on lower extremities, excoriations/scabs on L forearm    LABS:                        8.5    3.80  )-----------( 182      ( 20 Nov 2024 06:20 )             26.0     11-20    136  |  105  |  10  ----------------------------<  104[H]  4.3   |  22  |  1.31[H]    Ca    8.6      20 Nov 2024 06:20  Phos  3.9     11-20  Mg     1.60     11-20    TPro  7.0  /  Alb  2.8[L]  /  TBili  0.4  /  DBili  x   /  AST  55[H]  /  ALT  18  /  AlkPhos  52  11-20      Urinalysis Basic - ( 20 Nov 2024 06:20 )    Color: x / Appearance: x / SG: x / pH: x  Gluc: 104 mg/dL / Ketone: x  / Bili: x / Urobili: x   Blood: x / Protein: x / Nitrite: x   Leuk Esterase: x / RBC: x / WBC x   Sq Epi: x / Non Sq Epi: x / Bacteria: x            Rheumatology Work Up    C3 Complement, Serum: 34 mg/dL *L* [90 - 180] (11-19-24 @ 13:01)  C4 Complement, Serum: <4 mg/dL *L* [10 - 40] (11-19-24 @ 13:01)  Double Stranded DNA Antibody: >300 IU/mL *H* [Result Interpretation  <= 4 IU/mL:     Negative  5 - 9 IU/mL:     Indeterminate  >= 10 IU/mL:   Positive  Method: Multiplexed flow immunoassay] (11-19-24 @ 12:56)  Protein/Creatinine Ratio Calculation: 2.0 Ratio *H* [0.0 - 0.2] (11-16-24 @ 14:00)            RADIOLOGY & ADDITIONAL STUDIES:    < from: CT Angio Chest PE Protocol w/ IV Cont (11.10.24 @ 04:52) >    IMPRESSION:  No pulmonary embolus. No acute finding in the chest.    Suspect a small amount of perisplenic ascites, less likely artifact.   Consider abdominal ultrasound to confirm/better evaluate.    Mild bilateral axillary lymphadenopathy. Suggest follow-up physical   examination to ensure this resolves.    < end of copied text >    < from: CT Abdomen and Pelvis w/ IV Cont (11.15.24 @ 18:07) >  IMPRESSION:  Partially loculated perisplenic fluid tracking along the left paracolic   gutter.    Multiple heterogeneous uterine masses likely representing fibroids.   Questionable masslike hypoattenuation of the cervical region, which may   represent normal appearance of the cervix, however an underlying mass   cannot be excluded. Recommend further evaluation with pelvic ultrasound   or nonemergent contrast-enhanced pelvic MRI.    Nonspecific soft tissue nodules or lymph nodes in the right lower   quadrant and at the level of the aortic bifurcation.    < end of copied text >    < from: MR Pelvis w/wo IV Cont (11.19.24 @ 21:15) >    IMPRESSION:  Multiple fibroids. Normal appearance of the cervix.  A small enhancing lesion anterior to the left ovary, uncertain if it is   part of the left ovary lower within the adnexa. This may represent an   adnexal/ovarian mass or a benign finding like an involuting corpus   luteum. A pelvic MRI within 2 months is advised to reassess this finding.    < end of copied text >

## 2024-11-20 NOTE — PROGRESS NOTE ADULT - SUBJECTIVE AND OBJECTIVE BOX
Follow Up:      Interval History/ROS:  afebrile   IR evaluated for aspiration   MRI planned     Allergies  No Known Allergies        ANTIMICROBIALS:  cefTRIAXone   IVPB 1000 every 24 hours  doxycycline IVPB 100 every 12 hours  metroNIDAZOLE  IVPB 500 every 12 hours  metroNIDAZOLE  IVPB              OTHER MEDS:  acetaminophen     Tablet .. 650 milliGRAM(s) Oral every 6 hours PRN  aluminum hydroxide/magnesium hydroxide/simethicone Suspension 30 milliLiter(s) Oral every 4 hours PRN  enoxaparin Injectable 40 milliGRAM(s) SubCutaneous every 24 hours  fluticasone propionate 50 MICROgram(s)/spray Nasal Spray 2 Spray(s) Both Nostrils two times a day  influenza   Vaccine 0.5 milliLiter(s) IntraMuscular once  melatonin 3 milliGRAM(s) Oral at bedtime PRN  metoprolol succinate  milliGRAM(s) Oral daily      Vital Signs Last 24 Hrs  T(F): 98.2 (11-20-24 @ 05:06), Max: 98.9 (11-19-24 @ 14:30)  HR: 95 (11-20-24 @ 05:06)  BP: 141/98 (11-20-24 @ 05:06)  RR: 17 (11-20-24 @ 05:06)  SpO2: 100% (11-20-24 @ 05:06) (100% - 100%)        PHYSICAL EXAM:  Constitutional: No distress  Eyes: No icterus.  Oral cavity: Clear, no lesions  Neck: Supple  RS: Chest clear   CVS: no respiratory distress RA  Abdomen: Soft. No guarding/rigidity/tenderness.  : no pearl  Skin: No lesions noted  Neuro: Alert, oriented to time/place/person  Cranial nerves 2-12 grossly normal. No focal abnormalities                                     8.5    3.80  )-----------( 182      ( 20 Nov 2024 06:20 )             26.0 11-20    136  |  105  |  10  ----------------------------<  104  4.3   |  22  |  1.31  Ca    8.6      20 Nov 2024 06:20Phos  3.9     11-20Mg     1.60     11-20  TPro  7.0  /  Alb  2.8  /  TBili  0.4  /  DBili  x   /  AST  55  /  ALT  18  /  AlkPhos  52  11-20    urinUrine Microscopic-Add On (NC) (11.15.24 @ 15:30)   Review: Reviewed  Cast: 28 /LPF  White Blood Cell - Urine: 15 /HPF  Red Blood Cell - Urine: 126 /HPF  Bacteria: Occasional /HPF  Epithelial Cells: 14 /HPF        MICROBIOLOGY:  v  .Blood BLOOD  11-15-24   No growth at 4 days  --  --      .Blood BLOOD  11-15-24   No growth at 4 days  --  --      Clean Catch  11-15-24   <10,000 CFU/mL Normal Urogenital Charu  --  --      Clean Catch  11-10-24   <10,000 CFU/mL Normal Urogenital Charu  --  --      GC Amplification Result: NotDetec:    Rapid RVP Result: NotDetec (11-15 @ 21:51)        RADIOLOGY:    d< from: US Transvaginal (11.15.24 @ 21:29) >    ACC: [id number] EXAM:  US TRANSVAGINAL   ORDERED BY: ABDIAS DAVIS     PROCEDURE DATE:  11/15/2024          INTERPRETATION:  CLINICAL INFORMATION: Lower abdominal pain. Negative   beta hCG.    LMP: 10/20/2024.    COMPARISON: CT abdomen and pelvis 11/15/2024.    TECHNIQUE:  Endovaginal and transabdominal pelvic sonogram. Color and Spectral   Doppler was performed.    FINDINGS:  Uterus: 10.0 cm x 5.5 cm x 5.9 cm. Leiomyomatous uterus. For reference:   An anterior subserosal fibroid measuring 2.9 x2.9 x 2.8 cm, the lower   uterine segment fibroid measuring 3.6 x 2.9 x 2.8 cm, a pedunculated   subserosal fibroid measuring 2.1 x 2.2 x 2.2 cm.  Endometrium: 7 mm. Within normal limits.    Right ovary: 5.6 cm x 2.8 cm x 2.4 cm. Echogenic lesion measuring 1.0 x   1.1 x 1.0 cm. Normal arterial and venous waveforms.  Left ovary: 2.7 cm x 1.5 cm x 2.2 cm. Within normal limits. Normal   arterial and venous waveforms.    Fluid: Trace fluid.    IMPRESSION:  No sonographic evidence of ovarian torsion.    Fibroid uterus.    A 1.1 cm echogenic left ovarian lesion may represent a hemorrhagic cyst   or corpus albicans.    --- End of Report ---      < end of copied text >  < from: CT Abdomen and Pelvis w/ IV Cont (11.15.24 @ 18:07) >    ACC: [id number] EXAM:  CT ABDOMEN AND PELVIS IC   ORDERED BY: ABDIAS DAVIS     PROCEDURE DATE:  11/15/2024          INTERPRETATION:  CLINICAL INFORMATION: Abdominal pain, Fever and chills   for 2 weeks.    COMPARISON: Abdominal radiograph 10/27/2021. Abdominal ultrasound   3/27/2011. CT chest 11/10/2024.    CONTRAST/COMPLICATIONS:  IV Contrast: Omnipaque 350  90 cc administered   10 cc discarded  Oral Contrast: NONE      PROCEDURE:  CT of the Abdomen and Pelvis was performed.  Sagittal and coronal reformats were performed.    FINDINGS:  LOWER CHEST: Within normal limits.    LIVER: Within normal limits.  BILE DUCTS: Normal caliber.  GALLBLADDER: Within normal limits.  SPLEEN: Partially loculated perisplenic fluid tracking along the left   paracolic gutter.  PANCREAS: Within normal limits.  ADRENALS: Within normal limits.  KIDNEYS/URETERS: Within normal limits.    BLADDER: Within normal limits.  REPRODUCTIVE ORGANS: Multiple heterogeneous uterine masses likely   representing fibroids. Questionable masslike hypoattenuation of the   cervical region, which may represent normal appearance of the cervix   versus an underlying mass.    BOWEL: No bowel obstruction. Appendix is normal.  PERITONEUM/RETROPERITONEUM: Small volume perisplenic and pelvic ascites.  VESSELS: Within normal limits.  LYMPH NODES: Right lower quadrant, anterior to the psoas muscle (301   -60), 1.7 x 1.3 cm nodule or lymph node. Additional nodule/node at the   level of the aortic bifurcation (301-59), 1.7 x 1 cm.  ABDOMINAL WALL: Tiny fat-containing right inguinal hernia.  BONES: Within normal limits.    IMPRESSION:  Partially loculated perisplenic fluid tracking along the left paracolic   gutter.    Multiple heterogeneous uterine masses likely representing fibroids.   Questionable masslike hypoattenuation of the cervical region, which may   represent normal appearance of the cervix, however an underlying mass   cannot be excluded. Recommend further evaluation with pelvic ultrasound   or nonemergent contrast-enhanced pelvic MRI.    Nonspecific soft tissue nodules or lymph nodes in the right lower   quadrant and at the level of the aortic bifurcation.        --- End of Report ---      < end of copied text >     Follow Up:      Interval History/ROS:  afebrile   IR evaluated for aspiration   MRI done     Allergies  No Known Allergies        ANTIMICROBIALS:  cefTRIAXone   IVPB 1000 every 24 hours  doxycycline IVPB 100 every 12 hours  metroNIDAZOLE  IVPB 500 every 12 hours  metroNIDAZOLE  IVPB              OTHER MEDS:  acetaminophen     Tablet .. 650 milliGRAM(s) Oral every 6 hours PRN  aluminum hydroxide/magnesium hydroxide/simethicone Suspension 30 milliLiter(s) Oral every 4 hours PRN  enoxaparin Injectable 40 milliGRAM(s) SubCutaneous every 24 hours  fluticasone propionate 50 MICROgram(s)/spray Nasal Spray 2 Spray(s) Both Nostrils two times a day  influenza   Vaccine 0.5 milliLiter(s) IntraMuscular once  melatonin 3 milliGRAM(s) Oral at bedtime PRN  metoprolol succinate  milliGRAM(s) Oral daily      Vital Signs Last 24 Hrs  T(F): 98.2 (11-20-24 @ 05:06), Max: 98.9 (11-19-24 @ 14:30)  HR: 95 (11-20-24 @ 05:06)  BP: 141/98 (11-20-24 @ 05:06)  RR: 17 (11-20-24 @ 05:06)  SpO2: 100% (11-20-24 @ 05:06) (100% - 100%)        PHYSICAL EXAM:  Constitutional: No distress  Eyes: No icterus.  Oral cavity: Clear, no lesions  Neck: Supple  RS: Chest clear   CVS: no respiratory distress RA  Abdomen: Soft. No guarding/rigidity/tenderness.  : no pearl  Skin: No lesions noted  Neuro: Alert, oriented to time/place/person  Cranial nerves 2-12 grossly normal. No focal abnormalities                                     8.5    3.80  )-----------( 182      ( 20 Nov 2024 06:20 )             26.0 11-20    136  |  105  |  10  ----------------------------<  104  4.3   |  22  |  1.31  Ca    8.6      20 Nov 2024 06:20Phos  3.9     11-20Mg     1.60     11-20  TPro  7.0  /  Alb  2.8  /  TBili  0.4  /  DBili  x   /  AST  55  /  ALT  18  /  AlkPhos  52  11-20    urinUrine Microscopic-Add On (NC) (11.15.24 @ 15:30)   Review: Reviewed  Cast: 28 /LPF  White Blood Cell - Urine: 15 /HPF  Red Blood Cell - Urine: 126 /HPF  Bacteria: Occasional /HPF  Epithelial Cells: 14 /HPF        MICROBIOLOGY:  v  .Blood BLOOD  11-15-24   No growth at 4 days  --  --      .Blood BLOOD  11-15-24   No growth at 4 days  --  --      Clean Catch  11-15-24   <10,000 CFU/mL Normal Urogenital Charu  --  --      Clean Catch  11-10-24   <10,000 CFU/mL Normal Urogenital Charu  --  --      GC Amplification Result: NotDetec:    Rapid RVP Result: NotDetec (11-15 @ 21:51)        RADIOLOGY:    d< from: US Transvaginal (11.15.24 @ 21:29) >    ACC: [id number] EXAM:  US TRANSVAGINAL   ORDERED BY: ABDIAS DAVIS     PROCEDURE DATE:  11/15/2024          INTERPRETATION:  CLINICAL INFORMATION: Lower abdominal pain. Negative   beta hCG.    LMP: 10/20/2024.    COMPARISON: CT abdomen and pelvis 11/15/2024.    TECHNIQUE:  Endovaginal and transabdominal pelvic sonogram. Color and Spectral   Doppler was performed.    FINDINGS:  Uterus: 10.0 cm x 5.5 cm x 5.9 cm. Leiomyomatous uterus. For reference:   An anterior subserosal fibroid measuring 2.9 x2.9 x 2.8 cm, the lower   uterine segment fibroid measuring 3.6 x 2.9 x 2.8 cm, a pedunculated   subserosal fibroid measuring 2.1 x 2.2 x 2.2 cm.  Endometrium: 7 mm. Within normal limits.    Right ovary: 5.6 cm x 2.8 cm x 2.4 cm. Echogenic lesion measuring 1.0 x   1.1 x 1.0 cm. Normal arterial and venous waveforms.  Left ovary: 2.7 cm x 1.5 cm x 2.2 cm. Within normal limits. Normal   arterial and venous waveforms.    Fluid: Trace fluid.    IMPRESSION:  No sonographic evidence of ovarian torsion.    Fibroid uterus.    A 1.1 cm echogenic left ovarian lesion may represent a hemorrhagic cyst   or corpus albicans.    --- End of Report ---      < end of copied text >  < from: CT Abdomen and Pelvis w/ IV Cont (11.15.24 @ 18:07) >    ACC: [id number] EXAM:  CT ABDOMEN AND PELVIS IC   ORDERED BY: ABDIAS DAVIS     PROCEDURE DATE:  11/15/2024          INTERPRETATION:  CLINICAL INFORMATION: Abdominal pain, Fever and chills   for 2 weeks.    COMPARISON: Abdominal radiograph 10/27/2021. Abdominal ultrasound   3/27/2011. CT chest 11/10/2024.    CONTRAST/COMPLICATIONS:  IV Contrast: Omnipaque 350  90 cc administered   10 cc discarded  Oral Contrast: NONE      PROCEDURE:  CT of the Abdomen and Pelvis was performed.  Sagittal and coronal reformats were performed.    FINDINGS:  LOWER CHEST: Within normal limits.    LIVER: Within normal limits.  BILE DUCTS: Normal caliber.  GALLBLADDER: Within normal limits.  SPLEEN: Partially loculated perisplenic fluid tracking along the left   paracolic gutter.  PANCREAS: Within normal limits.  ADRENALS: Within normal limits.  KIDNEYS/URETERS: Within normal limits.    BLADDER: Within normal limits.  REPRODUCTIVE ORGANS: Multiple heterogeneous uterine masses likely   representing fibroids. Questionable masslike hypoattenuation of the   cervical region, which may represent normal appearance of the cervix   versus an underlying mass.    BOWEL: No bowel obstruction. Appendix is normal.  PERITONEUM/RETROPERITONEUM: Small volume perisplenic and pelvic ascites.  VESSELS: Within normal limits.  LYMPH NODES: Right lower quadrant, anterior to the psoas muscle (301   -60), 1.7 x 1.3 cm nodule or lymph node. Additional nodule/node at the   level of the aortic bifurcation (301-59), 1.7 x 1 cm.  ABDOMINAL WALL: Tiny fat-containing right inguinal hernia.  BONES: Within normal limits.    IMPRESSION:  Partially loculated perisplenic fluid tracking along the left paracolic   gutter.    Multiple heterogeneous uterine masses likely representing fibroids.   Questionable masslike hypoattenuation of the cervical region, which may   represent normal appearance of the cervix, however an underlying mass   cannot be excluded. Recommend further evaluation with pelvic ultrasound   or nonemergent contrast-enhanced pelvic MRI.    Nonspecific soft tissue nodules or lymph nodes in the right lower   quadrant and at the level of the aortic bifurcation.        --- End of Report ---      < end of copied text >

## 2024-11-21 LAB
ANION GAP SERPL CALC-SCNC: 10 MMOL/L — SIGNIFICANT CHANGE UP (ref 7–14)
ANTI-RIBONUCLEAR PROTEIN: 6.6 AI — HIGH
B2 GLYCOPROT1 IGA SER QL: 2 U/ML — SIGNIFICANT CHANGE UP
B2 GLYCOPROT1 IGG SER-ACNC: <1.4 U/ML — SIGNIFICANT CHANGE UP
B2 GLYCOPROT1 IGM SER-ACNC: <1.5 U/ML — SIGNIFICANT CHANGE UP
BUN SERPL-MCNC: 12 MG/DL — SIGNIFICANT CHANGE UP (ref 7–23)
CALCIUM SERPL-MCNC: 8.4 MG/DL — SIGNIFICANT CHANGE UP (ref 8.4–10.5)
CARDIOLIPIN IGM SER-MCNC: <1.5 MPL U/ML — SIGNIFICANT CHANGE UP
CARDIOLIPIN IGM SER-MCNC: <1.6 GPL U/ML — SIGNIFICANT CHANGE UP
CHLORIDE SERPL-SCNC: 105 MMOL/L — SIGNIFICANT CHANGE UP (ref 98–107)
CHOLEST SERPL-MCNC: 119 MG/DL — SIGNIFICANT CHANGE UP
CO2 SERPL-SCNC: 21 MMOL/L — LOW (ref 22–31)
CREAT SERPL-MCNC: 1.25 MG/DL — SIGNIFICANT CHANGE UP (ref 0.5–1.3)
CULTURE RESULTS: SIGNIFICANT CHANGE UP
CULTURE RESULTS: SIGNIFICANT CHANGE UP
DEPRECATED CARDIOLIPIN IGA SER: <2 APL U/ML — SIGNIFICANT CHANGE UP
DSDNA AB FLD-ACNC: 3.2 AI — HIGH
EGFR: 59 ML/MIN/1.73M2 — LOW
ENA SM AB FLD QL: >8 AI — HIGH
ENA SS-A AB FLD IA-ACNC: >8 AI — HIGH
GLUCOSE SERPL-MCNC: 104 MG/DL — HIGH (ref 70–99)
HAV IGM SER-ACNC: SIGNIFICANT CHANGE UP
HBV CORE AB SER-ACNC: SIGNIFICANT CHANGE UP
HBV CORE IGM SER-ACNC: SIGNIFICANT CHANGE UP
HBV SURFACE AG SER-ACNC: SIGNIFICANT CHANGE UP
HCT VFR BLD CALC: 24.5 % — LOW (ref 34.5–45)
HCV AB S/CO SERPL IA: 0.17 S/CO — SIGNIFICANT CHANGE UP (ref 0–0.99)
HCV AB SERPL-IMP: SIGNIFICANT CHANGE UP
HDLC SERPL-MCNC: 33 MG/DL — LOW
HGB BLD-MCNC: 8 G/DL — LOW (ref 11.5–15.5)
LIPID PNL WITH DIRECT LDL SERPL: 66 MG/DL — SIGNIFICANT CHANGE UP
LUPUS ANTICOAGULANT PROFILE RESULT: SIGNIFICANT CHANGE UP
MAGNESIUM SERPL-MCNC: 1.6 MG/DL — SIGNIFICANT CHANGE UP (ref 1.6–2.6)
MCHC RBC-ENTMCNC: 31.7 PG — SIGNIFICANT CHANGE UP (ref 27–34)
MCHC RBC-ENTMCNC: 32.7 G/DL — SIGNIFICANT CHANGE UP (ref 32–36)
MCV RBC AUTO: 97.2 FL — SIGNIFICANT CHANGE UP (ref 80–100)
NON HDL CHOLESTEROL: 86 MG/DL — SIGNIFICANT CHANGE UP
NRBC # BLD: 0 /100 WBCS — SIGNIFICANT CHANGE UP (ref 0–0)
NRBC # FLD: 0 K/UL — SIGNIFICANT CHANGE UP (ref 0–0)
PHOSPHATE SERPL-MCNC: 4.2 MG/DL — SIGNIFICANT CHANGE UP (ref 2.5–4.5)
PLATELET # BLD AUTO: 171 K/UL — SIGNIFICANT CHANGE UP (ref 150–400)
POTASSIUM SERPL-MCNC: 3.9 MMOL/L — SIGNIFICANT CHANGE UP (ref 3.5–5.3)
POTASSIUM SERPL-SCNC: 3.9 MMOL/L — SIGNIFICANT CHANGE UP (ref 3.5–5.3)
RBC # BLD: 2.52 M/UL — LOW (ref 3.8–5.2)
RBC # FLD: 13.1 % — SIGNIFICANT CHANGE UP (ref 10.3–14.5)
SODIUM SERPL-SCNC: 136 MMOL/L — SIGNIFICANT CHANGE UP (ref 135–145)
SPECIMEN SOURCE: SIGNIFICANT CHANGE UP
SPECIMEN SOURCE: SIGNIFICANT CHANGE UP
SSA-52 (RO52) (ENA) ANTIBODY, IGG: 98 AU/ML — HIGH (ref 0–40)
SSA-60 (RO60) (ENA) ANTIBODY, IGG: 143 AU/ML — HIGH (ref 0–40)
TRIGL SERPL-MCNC: 100 MG/DL — SIGNIFICANT CHANGE UP
WBC # BLD: 3.48 K/UL — LOW (ref 3.8–10.5)
WBC # FLD AUTO: 3.48 K/UL — LOW (ref 3.8–10.5)

## 2024-11-21 PROCEDURE — 99233 SBSQ HOSP IP/OBS HIGH 50: CPT | Mod: GC

## 2024-11-21 PROCEDURE — 99232 SBSQ HOSP IP/OBS MODERATE 35: CPT

## 2024-11-21 RX ORDER — DOXYCYCLINE HYCLATE 150 MG/1
1 TABLET, COATED ORAL
Qty: 15 | Refills: 0
Start: 2024-11-21 | End: 2024-11-27

## 2024-11-21 RX ORDER — GUAIFENESIN 400 MG
100 TABLET ORAL EVERY 6 HOURS
Refills: 0 | Status: DISCONTINUED | OUTPATIENT
Start: 2024-11-21 | End: 2024-11-22

## 2024-11-21 RX ORDER — METRONIDAZOLE 500 MG/1
1 TABLET ORAL
Qty: 15 | Refills: 0
Start: 2024-11-21 | End: 2024-11-27

## 2024-11-21 RX ADMIN — CETIRIZINE HYDROCHLORIDE 10 MILLIGRAM(S): 10 TABLET ORAL at 11:44

## 2024-11-21 RX ADMIN — Medication 1 TABLET(S): at 11:44

## 2024-11-21 RX ADMIN — Medication 100 MILLIGRAM(S): at 17:59

## 2024-11-21 RX ADMIN — METRONIDAZOLE 100 MILLIGRAM(S): 500 TABLET ORAL at 18:00

## 2024-11-21 RX ADMIN — METOPROLOL TARTRATE 100 MILLIGRAM(S): 100 TABLET, FILM COATED ORAL at 05:48

## 2024-11-21 RX ADMIN — METRONIDAZOLE 100 MILLIGRAM(S): 500 TABLET ORAL at 05:49

## 2024-11-21 RX ADMIN — DOXYCYCLINE HYCLATE 100 MILLIGRAM(S): 150 TABLET, COATED ORAL at 05:49

## 2024-11-21 RX ADMIN — Medication 100 MILLIGRAM(S): at 05:19

## 2024-11-21 RX ADMIN — DOXYCYCLINE HYCLATE 100 MILLIGRAM(S): 150 TABLET, COATED ORAL at 18:00

## 2024-11-21 RX ADMIN — Medication 100 MILLIGRAM(S): at 05:49

## 2024-11-21 NOTE — PROGRESS NOTE ADULT - ATTENDING COMMENTS
I have personally seen and examined patient on the above date. I discussed the case with resident and I agree with the findings and plan as detailed per note above, which I have amended where appropriate.    Ms. Garza is a 30 y/o F with PMH of HTN who presented to [facility] with ongoing worsening lower abd pain, fevers, chills for the past 2 weeks, previously seen in [facility] ED on 11/5/24, found with UTI, discharged on cefpodoxime, presented back to [facility] ED on 11/10 with similar sx and discharged from ED and now admitted due to persistent nature of the abdominal pain for further evaluation.     Pt seen and examined at bedside this morning. Abdominal pain resolved. Tolerating diet. EBV serologies reviewed, d/w ID and report it is remote infection and to c/w current abx therapy. No cp, sob, fevers, chills, abd pain, melena/hematochezia. +BM. Labs and vitals reviewed.    #SIRS + on admission   - Fever, tachycardic on admission. No clear source of infection. Likely secondary to lupus flare  - Afebrile x 4days  - Pt developed RLE rash. Serology c/w lupus flare  - Rheum consulted but pt prefers to follow up outpt with her own nephrologist    #Nephrotic syndrome  24h urine protein >3.5g, albumin 2.8 + B/L lower extremity edema  Likely secondary to lupus nephritis  Nephrology consulted and rec kidney biopsy but pt deferring to outpatient if cannot be done by tomorrow 11/22  -f/u nephro outpt    #Lower abd pain   #Fibroids   - Pain resolved  - CT AP notable for: loculated perisplenic fluid along with L paracolic gutter, multiple heterogenous uterine masses likely fibroids, nonspecific soft tissue nodules/lymph nodes in RLQ and at the level of aortic bifurcation    - TVUS on 11/15 -> negative for torsion, fibroid uterus, 1.1cm echogenic L ovarian torsion may represent a hemorrhagic cyst or corpus albicans   - GYN consulted in ED, TVUS as above, rec tx for possible PID  - UA on 11/10 notable for: cloudy, orange urine, trace LE, +bacteria, WBC 17, +blood, CFU <10K. RVP negative.   - ID recs appreciated   - c/w CTX/Doxycycline/flagyl. EBV labs reviewed and per ID, suspect remote infection. syphilis screen negative   - F.u BCX = NGTD thus far   - HIV negative   - GC chlamydia negative  - MRI pelvis showed " A small enhancing lesion anterior to the left ovary, uncertain if it is part of the left ovary lower within the adnexa. This may represent an adnexal/ovarian mass or a benign finding like an involuting corpus luteum"  - Will need repeat MRI in 2months       #HTN  - c/w home metoprolol for now, monitor vitals closely    #SILVESTRE   - Likely due to lupus nephritis. Pt will need biopsy to confirm  - f/u nephrology outpatient     #Proteinuria  - noted to have proteinuria previously, urine protein      Rest of plan as above.

## 2024-11-21 NOTE — PROGRESS NOTE ADULT - SUBJECTIVE AND OBJECTIVE BOX
PROGRESS NOTE:     Patient is a 29y old  Female who presents with a chief complaint of Abdominal Pain, Fever (20 Nov 2024 14:38)      SUBJECTIVE / OVERNIGHT EVENTS:    OVERNIGHT: No acute overnight events.      Patient was examined at bedside and feels well this morning. Denies fever, chills, chest pain, SOB, nausea, vomiting. ROS otherwise negative and pt is amenable to current treatment plan.      REVIEW OF SYSTEMS:    CONSTITUTIONAL:  No weakness, fevers, or chills  EYES/ENT:  No visual changes, vertigo, or throat pain   NECK:  No pain or stiffness  RESPIRATORY:  No SOB, cough, wheezing, or hemoptysis  CARDIOVASCULAR:  No chest pain or palpitations  GASTROINTESTINAL:  No abdominal pain, nausea, vomiting, or hematemesis; No diarrhea or constipation; No melena or hematochezia.  GENITOURINARY:  No dysuria, change in frequency, or hematuria  NEUROLOGICAL:  No numbness or weakness  SKIN:  No itching or rashes      MEDICATIONS  (STANDING):  cefTRIAXone   IVPB 1000 milliGRAM(s) IV Intermittent every 24 hours  cetirizine 10 milliGRAM(s) Oral daily  doxycycline IVPB 100 milliGRAM(s) IV Intermittent every 12 hours  enoxaparin Injectable 40 milliGRAM(s) SubCutaneous every 24 hours  fluticasone propionate 50 MICROgram(s)/spray Nasal Spray 2 Spray(s) Both Nostrils two times a day  influenza   Vaccine 0.5 milliLiter(s) IntraMuscular once  metoprolol succinate  milliGRAM(s) Oral daily  metroNIDAZOLE  IVPB 500 milliGRAM(s) IV Intermittent every 12 hours  metroNIDAZOLE  IVPB      multivitamin 1 Tablet(s) Oral daily    MEDICATIONS  (PRN):  acetaminophen     Tablet .. 650 milliGRAM(s) Oral every 6 hours PRN Temp greater or equal to 38C (100.4F), Mild Pain (1 - 3)  aluminum hydroxide/magnesium hydroxide/simethicone Suspension 30 milliLiter(s) Oral every 4 hours PRN Dyspepsia  melatonin 3 milliGRAM(s) Oral at bedtime PRN Insomnia      CAPILLARY BLOOD GLUCOSE        I&O's Summary    20 Nov 2024 07:01  -  21 Nov 2024 07:00  --------------------------------------------------------  IN: 250 mL / OUT: 0 mL / NET: 250 mL        PHYSICAL EXAM:  Vital Signs Last 24 Hrs  T(C): 37.3 (21 Nov 2024 05:15), Max: 37.3 (21 Nov 2024 05:15)  T(F): 99.1 (21 Nov 2024 05:15), Max: 99.1 (21 Nov 2024 05:15)  HR: 104 (21 Nov 2024 05:15) (100 - 104)  BP: 144/95 (21 Nov 2024 05:15) (127/91 - 144/95)  BP(mean): --  RR: 17 (21 Nov 2024 05:15) (17 - 18)  SpO2: 99% (21 Nov 2024 05:15) (99% - 100%)    Parameters below as of 21 Nov 2024 05:15  Patient On (Oxygen Delivery Method): room air        CONSTITUTIONAL: NAD; well-developed  HEENT: PERRL, clear conjunctiva  RESPIRATORY: Normal respiratory effort; lungs are clear to auscultation bilaterally; No crackles/rhonchi/wheezing  CARDIOVASCULAR: Regular rate and rhythm, normal S1 and S2, no murmur/rub/gallop; No lower extremity edema; Peripheral pulses are 2+ bilaterally  ABDOMEN: Nontender to palpation, normoactive bowel sounds, no rebound/guarding; No hepatosplenomegaly  MUSCULOSKELETAL: No clubbing or cyanosis of digits; no joint swelling or tenderness to palpation  EXTREMITY: Lower extremities non-tender to palpation; non-erythematous B/L  NEURO: A&Ox3; no focal deficits   PSYCH: Normal mood; affect appropriate    LABS:                        8.0    3.48  )-----------( 171      ( 21 Nov 2024 07:06 )             24.5     11-20    136  |  105  |  10  ----------------------------<  104[H]  4.3   |  22  |  1.31[H]    Ca    8.6      20 Nov 2024 06:20  Phos  3.9     11-20  Mg     1.60     11-20    TPro  7.0  /  Alb  2.8[L]  /  TBili  0.4  /  DBili  x   /  AST  55[H]  /  ALT  18  /  AlkPhos  52  11-20    PT/INR - ( 21 Nov 2024 07:06 )   PT: 13.0 sec;   INR: 1.09 ratio         PTT - ( 21 Nov 2024 07:06 )  PTT:31.2 sec      Urinalysis Basic - ( 20 Nov 2024 06:20 )    Color: x / Appearance: x / SG: x / pH: x  Gluc: 104 mg/dL / Ketone: x  / Bili: x / Urobili: x   Blood: x / Protein: x / Nitrite: x   Leuk Esterase: x / RBC: x / WBC x   Sq Epi: x / Non Sq Epi: x / Bacteria: x          RADIOLOGY & ADDITIONAL TESTS:    N/A PROGRESS NOTE:     Patient is a 29y old  Female who presents with a chief complaint of Abdominal Pain, Fever (20 Nov 2024 14:38)      SUBJECTIVE / OVERNIGHT EVENTS:    OVERNIGHT: No acute overnight events.  Patient was examined at bedside and feels well this morning. She endorses the same dry cough and dark brown urine as for the past couple weeks. She reports that the lower extremity edema is unchanged from yesterday. Denies fever, chills, chest pain, SOB, nausea, vomiting, sore throat, dysuria, weakness, body aches, or swollen joints. ROS otherwise negative and pt is amenable to current treatment plan.      REVIEW OF SYSTEMS:    CONSTITUTIONAL:  No weakness, fevers, or chills  EYES/ENT:  No visual changes, vertigo, or throat pain   NECK:  No pain or stiffness  RESPIRATORY:  Dry cough. No SOB, wheezing, or hemoptysis  CARDIOVASCULAR:  No chest pain or palpitations  GASTROINTESTINAL:  No abdominal pain, nausea, vomiting, or hematemesis; No diarrhea or constipation; No melena or hematochezia.  GENITOURINARY:  Dark brown urine. No dysuria, change in frequency, or hematuria  NEUROLOGICAL:  No numbness or weakness  SKIN:  No itching or rashes      MEDICATIONS  (STANDING):  cefTRIAXone   IVPB 1000 milliGRAM(s) IV Intermittent every 24 hours  cetirizine 10 milliGRAM(s) Oral daily  doxycycline IVPB 100 milliGRAM(s) IV Intermittent every 12 hours  enoxaparin Injectable 40 milliGRAM(s) SubCutaneous every 24 hours  fluticasone propionate 50 MICROgram(s)/spray Nasal Spray 2 Spray(s) Both Nostrils two times a day  influenza   Vaccine 0.5 milliLiter(s) IntraMuscular once  metoprolol succinate  milliGRAM(s) Oral daily  metroNIDAZOLE  IVPB 500 milliGRAM(s) IV Intermittent every 12 hours  metroNIDAZOLE  IVPB      multivitamin 1 Tablet(s) Oral daily    MEDICATIONS  (PRN):  acetaminophen     Tablet .. 650 milliGRAM(s) Oral every 6 hours PRN Temp greater or equal to 38C (100.4F), Mild Pain (1 - 3)  aluminum hydroxide/magnesium hydroxide/simethicone Suspension 30 milliLiter(s) Oral every 4 hours PRN Dyspepsia  melatonin 3 milliGRAM(s) Oral at bedtime PRN Insomnia      CAPILLARY BLOOD GLUCOSE        I&O's Summary    20 Nov 2024 07:01  -  21 Nov 2024 07:00  --------------------------------------------------------  IN: 250 mL / OUT: 0 mL / NET: 250 mL        PHYSICAL EXAM:  Vital Signs Last 24 Hrs  T(C): 37.3 (21 Nov 2024 05:15), Max: 37.3 (21 Nov 2024 05:15)  T(F): 99.1 (21 Nov 2024 05:15), Max: 99.1 (21 Nov 2024 05:15)  HR: 104 (21 Nov 2024 05:15) (100 - 104)  BP: 144/95 (21 Nov 2024 05:15) (127/91 - 144/95)  BP(mean): --  RR: 17 (21 Nov 2024 05:15) (17 - 18)  SpO2: 99% (21 Nov 2024 05:15) (99% - 100%)    Parameters below as of 21 Nov 2024 05:15  Patient On (Oxygen Delivery Method): room air        CONSTITUTIONAL: NAD; well-developed  HEENT: PERRL, clear conjunctiva  RESPIRATORY: Normal respiratory effort; lungs are clear to auscultation bilaterally; No crackles/rhonchi/wheezing  CARDIOVASCULAR: Regular rate and rhythm, normal S1 and S2, no murmur/rub/gallop; +1 lower extremity edema; Peripheral pulses are 2+ bilaterally  ABDOMEN: Nontender to palpation, normoactive bowel sounds, no rebound/guarding  MUSCULOSKELETAL: No clubbing or cyanosis of digits; no joint swelling or tenderness to palpation  EXTREMITY: Lower extremities non-tender to palpation; non-erythematous B/L. Unchanged erythematous macules on R lower extremity  NEURO: A&Ox3; no focal deficits   PSYCH: Normal mood; affect appropriate    LABS:                        8.0    3.48  )-----------( 171      ( 21 Nov 2024 07:06 )             24.5     11-20    136  |  105  |  10  ----------------------------<  104[H]  4.3   |  22  |  1.31[H]    Ca    8.6      20 Nov 2024 06:20  Phos  3.9     11-20  Mg     1.60     11-20    TPro  7.0  /  Alb  2.8[L]  /  TBili  0.4  /  DBili  x   /  AST  55[H]  /  ALT  18  /  AlkPhos  52  11-20    PT/INR - ( 21 Nov 2024 07:06 )   PT: 13.0 sec;   INR: 1.09 ratio         PTT - ( 21 Nov 2024 07:06 )  PTT:31.2 sec      Urinalysis Basic - ( 20 Nov 2024 06:20 )    Color: x / Appearance: x / SG: x / pH: x  Gluc: 104 mg/dL / Ketone: x  / Bili: x / Urobili: x   Blood: x / Protein: x / Nitrite: x   Leuk Esterase: x / RBC: x / WBC x   Sq Epi: x / Non Sq Epi: x / Bacteria: x          RADIOLOGY & ADDITIONAL TESTS:    N/A

## 2024-11-21 NOTE — PRE PROCEDURE NOTE - PRE PROCEDURE EVALUATION
Patient Age & Gender: 29F    Procedure (including site/side if known): TBD by IR proceduralist    Diagnosis/Indication: Concern Lupus Nephritis, new diagnosis    Interventional Radiology Attending Physician: Jake    Ordering Attending Physician: Aidan Morrow    Pertinent medical history: Likely new diagnosis Lupus    Pertinent Labs:                        8.0    3.48  )-----------( 171      ( 21 Nov 2024 07:06 )             24.5      11-21    136  |  105  |  12  ----------------------------<  104[H]  3.9   |  21[L]  |  1.25    Ca    8.4      21 Nov 2024 07:06  Phos  4.2     11-21  Mg     1.60     11-21    TPro  7.0  /  Alb  2.8[L]  /  TBili  0.4  /  DBili  x   /  AST  55[H]  /  ALT  18  /  AlkPhos  52  11-20   PT/INR - ( 21 Nov 2024 07:06 )   PT: 13.0 sec;   INR: 1.09 ratio         PTT - ( 21 Nov 2024 07:06 )  PTT:31.2 sec      Patient and Family aware? Yes      Attending/Resident/NP/PA: Giovanna Venegas MD    Contact:            TEAMS                Date:     11/21/24                               time:  0334

## 2024-11-21 NOTE — PROGRESS NOTE ADULT - ATTENDING COMMENTS
Patient seen and examined at bedside. Agree with assessment and plan as above. Concern for lupus nephritis - will try to expedite renal biopsy. May plan a course of pulse steroids prior to discharge.

## 2024-11-21 NOTE — PROGRESS NOTE ADULT - ASSESSMENT
29F with PMH of HTN, Sjogren's presenting with lower abdominal pain, fever and chills for the past 2 weeks. Evaluated by GYN at the ED, working ddx GYN vs  vs perisplenic ascites, Started on CTX/Doxy/Flagyl (11/15-).      Relevant hx:  - s/p Cefpodoxime from suspected UTI from [facility],   - re-presented to [facility] 11/10 for similar symptoms; CT PE showed perisplenic ascites and b/l axillary lymphadenopathy

## 2024-11-21 NOTE — PROGRESS NOTE ADULT - SUBJECTIVE AND OBJECTIVE BOX
[facility]  [id number]    INTERVAL HPI/OVERNIGHT EVENTS: Pt feels well    MEDICATIONS  (STANDING):  cetirizine 10 milliGRAM(s) Oral daily  doxycycline IVPB 100 milliGRAM(s) IV Intermittent every 12 hours  enoxaparin Injectable 40 milliGRAM(s) SubCutaneous every 24 hours  influenza   Vaccine 0.5 milliLiter(s) IntraMuscular once  metoprolol succinate  milliGRAM(s) Oral daily  metroNIDAZOLE  IVPB 500 milliGRAM(s) IV Intermittent every 12 hours  metroNIDAZOLE  IVPB      multivitamin 1 Tablet(s) Oral daily    MEDICATIONS  (PRN):  acetaminophen     Tablet .. 650 milliGRAM(s) Oral every 6 hours PRN Temp greater or equal to 38C (100.4F), Mild Pain (1 - 3)  melatonin 3 milliGRAM(s) Oral at bedtime PRN Insomnia      Allergies    No Known Allergies    Intolerances        Review of Systems:   General: No fevers/chills, no fatigue  HEENT: No blurry vision, dysphagia, or odynophagia  CVS: No CP/palpitations  Resp: No SOB/wheezing  GI: No N/V/C/D/abdominal pain  MSK: No joint pains  Skin: No new rashes  Neuro: No headaches      Vital Signs Last 24 Hrs  T(C): 37.3 (21 Nov 2024 12:23), Max: 37.3 (21 Nov 2024 05:15)  T(F): 99.1 (21 Nov 2024 12:23), Max: 99.1 (21 Nov 2024 05:15)  HR: 98 (21 Nov 2024 12:23) (98 - 104)  BP: 134/89 (21 Nov 2024 12:23) (134/89 - 144/95)  BP(mean): --  RR: 18 (21 Nov 2024 12:23) (17 - 18)  SpO2: 100% (21 Nov 2024 12:23) (99% - 100%)    Parameters below as of 21 Nov 2024 12:23  Patient On (Oxygen Delivery Method): room air        Physical Exam:  General: NAD  HEENT: EOMI, MMM  Cardio: +S1/S2, RRR  Resp: CTA b/l  GI: +BS, soft, NT/ND  MSK: No synovitis  Neuro: AAOx3  Psych: wnl    LABS:                        8.0    3.48  )-----------( 171      ( 21 Nov 2024 07:06 )             24.5     11-21    136  |  105  |  12  ----------------------------<  104[H]  3.9   |  21[L]  |  1.25    Ca    8.4      21 Nov 2024 07:06  Phos  4.2     11-21  Mg     1.60     11-21    TPro  7.0  /  Alb  2.8[L]  /  TBili  0.4  /  DBili  x   /  AST  55[H]  /  ALT  18  /  AlkPhos  52  11-20    PT/INR - ( 21 Nov 2024 07:06 )   PT: 13.0 sec;   INR: 1.09 ratio         PTT - ( 21 Nov 2024 07:06 )  PTT:31.2 sec  Urinalysis Basic - ( 21 Nov 2024 07:06 )    Color: x / Appearance: x / SG: x / pH: x  Gluc: 104 mg/dL / Ketone: x  / Bili: x / Urobili: x   Blood: x / Protein: x / Nitrite: x   Leuk Esterase: x / RBC: x / WBC x   Sq Epi: x / Non Sq Epi: x / Bacteria: x          RADIOLOGY & ADDITIONAL TESTS:  from: CT Angio Chest PE Protocol w/ IV Cont (11.10.24 @ 04:52) >    IMPRESSION:  No pulmonary embolus. No acute finding in the chest.    Suspect a small amount of perisplenic ascites, less likely artifact.   Consider abdominal ultrasound to confirm/better evaluate.    Mild bilateral axillary lymphadenopathy. Suggest follow-up physical   examination to ensure this resolves.    < end of copied text >    < from: CT Abdomen and Pelvis w/ IV Cont (11.15.24 @ 18:07) >  IMPRESSION:  Partially loculated perisplenic fluid tracking along the left paracolic   gutter.    Multiple heterogeneous uterine masses likely representing fibroids.   Questionable masslike hypoattenuation of the cervical region, which may   represent normal appearance of the cervix, however an underlying mass   cannot be excluded. Recommend further evaluation with pelvic ultrasound   or nonemergent contrast-enhanced pelvic MRI.    Nonspecific soft tissue nodules or lymph nodes in the right lower   quadrant and at the level of the aortic bifurcation.    < end of copied text >    < from: MR Pelvis w/wo IV Cont (11.19.24 @ 21:15) >    IMPRESSION:  Multiple fibroids. Normal appearance of the cervix.  A small enhancing lesion anterior to the left ovary, uncertain if it is   part of the left ovary lower within the adnexa. This may represent an   adnexal/ovarian mass or a benign finding like an involuting corpus   luteum. A pelvic MRI within 2 months is advised to reassess this finding.    < end of copied text >

## 2024-11-21 NOTE — PROGRESS NOTE ADULT - PROBLEM SELECTOR PLAN 7
11/10 proteinuria > 1000  11/15 proteinuria > 500  a1c 5.4; Pr/Cr 2, normal Cr  ddx transient proteinuria, orthostatic proteinuria vs GN     Plan:   - outpatient nephrology follow up; has appointment for 11/19/24  - 24 hr urine collection as above - c/w home metoprolol 100mg daily

## 2024-11-21 NOTE — PROGRESS NOTE ADULT - PROBLEM SELECTOR PLAN 3
11/15 CT A/P LYMPH NODES: Right lower quadrant, anterior to the psoas muscle (301 -60), 1.7 x 1.3 cm nodule or lymph node. Additional nodule/node at the level of the aortic bifurcation (301-59), 1.7 x 1 cm  11/10: Mild bilateral axillary lymphadenopathy.   cervical lymphadenopathy on examine  painless lymphadenopathy    Plan:  - f/u workup as above 11/10 proteinuria > 1000  11/15 proteinuria > 500  a1c 5.4; Pr/Cr 2, normal Cr  24 hour urine significant >3.5 g protein   ddx transient proteinuria, orthostatic proteinuria vs GN   likely iso of lupus nephritis (will need kidney biopsy to confirm)    Plan:   - kidney biopsy with IR  - outpatient nephrology follow up; has appointment for 11/19/24

## 2024-11-21 NOTE — PROGRESS NOTE ADULT - PROBLEM SELECTOR PLAN 2
Differential includes GYN etiology ruptured ovarian cyst vs PMD/fibroid vs. endometriosis vs infectious causes PID vs  pyelonephritis  BHCG -ve  11/15 CT abdomen reveals partially loculated perisplenic fluid. Multiple uterine masses likely representing fibroids. Nonspecific soft tissue nodules or lymph nodes in RLQ and level of aortic bifurcation.  Patient was seen by Ob/gyn in ED, low clinical suspicion for PID at this time given no CMT.  11/15 TVUS -ve for torsion; > 1.1 cm echogenic left ovarian lesion may represent a hemorrhagic cyst or corpus albicans. Fibroid uterus.  11/19 MR pelvis: small enhancing lesion anterior to left ovary representing adnexal/ovarian mass vs involuting corpus luteum.     Plan   - f/u pelvic MRI in 2 months to reassess  - workup as above  - Gyn consulted on admission, no acute intervention

## 2024-11-21 NOTE — PROGRESS NOTE ADULT - ASSESSMENT
29F PMH HTN presenting with LE abdominal pain and fevers, found to have loculated perisplenic fluid L paracolic gutter. Rheumatology consulted to evaluate for SLE.    #SLE  -Pt states she was diagnosed with sjogrens by Dr Ferris, last seen about 3 yrs ago, was having mild b/l knee pain at the time, no other symptoms  -Now found to have dsDNA >300, low complements, RNP positive (67), with anemia and UA with proteinuria and hematuria (UPCR 2.0). Anemia is not hemolytic, normal hapto and bili  -Given serologies and UA with proteinuria and hematuria, high suspicion for SLE and LN. Meets 16 points on EULAR ACR Lupus criteria. Loculated perisplenic fluid is likely unrelated to SLE, it would be unusual for loculated fluid from autoimmune causes.     Recommendations:  -Discussed need for renal biopsy with pt and pt's mother. They would prefer to see outpt rheumatologist and nephrologist. Discussed that obtaining biopsies are more streamlined in the hospital, and that we would want to treat as soon as possible to prevent further renal injury pending bx results. Pt and mother verbalized understanding. They would like to see how quickly IR bx can be done inpt vs outpt. Pending inpatient IR scheduling. Pt has outpt rheum appt 11/22  -Will follow up serologies  -Will hold on steroids for now given antibiotic treatment for loculated perisplenic fluid    Case discussed with Dr. Reyes Plata MD  Rheumatology Fellow   29F PMH HTN presenting with LE abdominal pain and fevers, found to have loculated perisplenic fluid L paracolic gutter. Rheumatology consulted to evaluate for SLE.    #SLE  -Pt states she was diagnosed with sjogrens by Dr Nagy, last seen about 3 yrs ago, was having mild b/l knee pain at the time, no other symptoms  -Now found to have dsDNA >300, low complements, RNP positive (67), with anemia and UA with proteinuria and hematuria (UPCR 2.0). Anemia is not hemolytic, normal hapto and bili  -Given serologies and UA with proteinuria and hematuria, high suspicion for SLE and LN. Meets 16 points on EULAR ACR Lupus criteria. Loculated perisplenic fluid is likely unrelated to SLE, it would be unusual for loculated fluid from autoimmune causes.     Recommendations:  -Discussed need for renal biopsy with pt and pt's mother. They would prefer to see outpt rheumatologist and nephrologist. Discussed that obtaining biopsies are more streamlined in the hospital, and that we would want to treat as soon as possible to prevent further renal injury pending bx results. Pt and mother verbalized understanding. They would like to see how quickly IR bx can be done inpt vs outpt. Pending inpatient IR scheduling. Pt has outpt rheum appt 11/22  -Will follow up serologies  -Will hold on steroids for now given antibiotic treatment for loculated perisplenic fluid    Case discussed with Dr. Reyes Plata MD  Rheumatology Fellow

## 2024-11-21 NOTE — PROGRESS NOTE ADULT - PROBLEM SELECTOR PLAN 5
repeat UA 11/19 with proteinuria and hematuria, +WBCs, trace LE, neg nitrites, bilirubin, and bacteria    Plan:  - abx as above  -  urine cx <10K CFU Hgb 8.9  - 11/18 ferritin 688 high, total Fe 47 normal, TIBC 139 low - consistent with chronic inflammation/anemia of chronic disease picture  - 11/18 serum B12 and folate in normal range  - 11/18 LDH high; normal haptoglobin, reticulocyte%, and absolute retics. No nucleated RBCs. Hemolysis unlikely  - reports menstrual period about to come, not actively menstruating.    Plan:  - f/u autoimmune/inflammatory workup as above

## 2024-11-21 NOTE — PROGRESS NOTE ADULT - PROBLEM SELECTOR PLAN 6
- c/w home metoprolol 100mg daily repeat UA 11/19 with proteinuria and hematuria, +WBCs, trace LE, neg nitrites, bilirubin, and bacteria    Plan:  - abx as above  -  urine cx <10K CFU

## 2024-11-21 NOTE — PROGRESS NOTE ADULT - PROBLEM SELECTOR PLAN 1
normal wbc clinically unknown source: GYN vs  vs perisplenic loculated ascites  UTI s/p cefpodoxime 200mg BID treatment at home for UTI, last dose today,   11/10 [facility] Urine culture showed normal urogenital oanh  11/10 CT/PE bl axillary lymphadenopathy   11/15 CT abdomen reveals partially loculated perisplenic fluid. Multiple uterine masses likely representing fibroids. Nonspecific soft tissue nodules or lymph nodes in RLQ and level of aortic bifurcation.  11/17 -ve HIV, GC, chlamydia, trichomonas, syphilis, blood clx NGTD, uclx wnl  11/20 low C3 and C4, positive dsDNA and anti-RNP. UA with proteinuria and hematuria, trace LE, neg nitrites and bacteria. procal 0.12.     Plan:  - rheum consulted to evaluate for SLE  - f/u 24hr urine collection  - empirical PID coverage/: CTX, Doxy 100mg BID 14d, flagyl 500mg BID 14d if no other source per GYN.   - ID recs appreciated; rec c/w current regimen. suspect +EBV, remote infection (not acute).  - IR consulted for perisplenic loculated ascites: will consider CT drainage vs US guided drainage. US Abd 11/18 shows small volume perisplenic ascites, unchanged from 11/15. normal wbc clinically unknown source: GYN vs  vs perisplenic loculated ascites  UTI s/p cefpodoxime 200mg BID treatment at home for UTI, last dose today,   11/10 [facility] Urine culture showed normal urogenital oanh  11/10 CT/PE bl axillary lymphadenopathy   11/15 CT abdomen reveals partially loculated perisplenic fluid. Multiple uterine masses likely representing fibroids. Nonspecific soft tissue nodules or lymph nodes in RLQ and level of aortic bifurcation.  11/17 -ve HIV, GC, chlamydia, trichomonas, syphilis, blood clx NGTD, uclx wnl  11/20 low C3 and C4, positive dsDNA and anti-RNP. UA with proteinuria and hematuria, trace LE, neg nitrites and bacteria. procal 0.12.     Plan:  - rheum consulted to evaluate for SLE  - f/u lupus anticoagulant profile  - f/u 24hr urine collection  - empirical PID coverage/: CTX, Doxy 100mg BID 14d, flagyl 500mg BID 14d if no other source per GYN.   - ID recs appreciated; rec c/w current regimen. suspect +EBV, remote infection (not acute).  - IR consulted for perisplenic loculated ascites: will consider CT drainage vs US guided drainage. US Abd 11/18 shows small volume perisplenic ascites, unchanged from 11/15. normal wbc clinically unknown source: GYN vs  vs perisplenic loculated ascites  UTI s/p cefpodoxime 200mg BID treatment at home for UTI, last dose today,   11/10 [facility] Urine culture showed normal urogenital oanh  11/10 CT/PE bl axillary lymphadenopathy   11/15 CT abdomen reveals partially loculated perisplenic fluid. Multiple uterine masses likely representing fibroids. Nonspecific soft tissue nodules or lymph nodes in RLQ and level of aortic bifurcation.  11/17 -ve HIV, GC, chlamydia, trichomonas, syphilis, blood clx NGTD, uclx wnl  11/20 low C3 and C4, positive dsDNA and anti-RNP. UA with proteinuria and hematuria, trace LE, neg nitrites and bacteria. procal 0.12.   - likely iso of newly diagnosed SLE  Plan:  - rheum consulted recommend kidney biopsy  - f/u lupus anticoagulant profile  - f/u 24hr urine collection  - empirical PID coverage/: CTX, Doxy 100mg BID 14d, flagyl 500mg BID 14d if no other source per GYN.   - ID recs appreciated; rec c/w current regimen. suspect +EBV, remote infection (not acute).  - IR consulted for perisplenic loculated ascites: will consider CT drainage vs US guided drainage. US Abd 11/18 shows small volume perisplenic ascites, unchanged from 11/15.

## 2024-11-21 NOTE — PROGRESS NOTE ADULT - PROBLEM SELECTOR PLAN 4
Hgb 8.9  - 11/18 ferritin 688 high, total Fe 47 normal, TIBC 139 low - consistent with chronic inflammation/anemia of chronic disease picture  - 11/18 serum B12 and folate in normal range  - 11/18 LDH high; normal haptoglobin, reticulocyte%, and absolute retics. No nucleated RBCs. Hemolysis unlikely  - reports menstrual period about to come, not actively menstruating.    Plan:  - f/u autoimmune/inflammatory workup as above 11/15 CT A/P LYMPH NODES: Right lower quadrant, anterior to the psoas muscle (301 -60), 1.7 x 1.3 cm nodule or lymph node. Additional nodule/node at the level of the aortic bifurcation (301-59), 1.7 x 1 cm  11/10: Mild bilateral axillary lymphadenopathy.   cervical lymphadenopathy on examine  painless lymphadenopathy    Plan:  - f/u workup as above

## 2024-11-21 NOTE — CHART NOTE - NSCHARTNOTEFT_GEN_A_CORE
IR Follow-Up     29 year old female with PMHx HTN, Sjogren's syndrome presents fevers and abdominal pain. Patient found to have a perisplenic fluid collection that tracks into the left paracolic gutter noted on CT abdomen and pelvis 11/15/24. IR originally consulted for possible drainage of perisplenic collection. After further discussion with primary team, perisplenic collection unlikely source of patient's fever. After further evaluation by rheumatology, there is a clinical suspicion for SLE flare. Renal biopsy was recommended to guide treatment. IR consulted for possible renal biopsy.     -- Will tentatively plan for renal biopsy on 11/22/2024   -- Please make patient NPO at midnight  -- Hold anticoagulation during the AM  -- Keep labs up to date (CBC/BMP/coags)  -- Please place IR pre-procedure note and procedure order request under Dr. Joseph.     --  Maxwell Mosher DO, PGY-2  Vascular and Interventional Radiology   Available on [employer] Teams    For EMERGENT inquiries/questions:  IR Pager ([facility]): [phone]  IR Pager ([facility]): [phone] ; [phone]    For non-emergent consults/questions:   Please place a sunrise order "Consult- Interventional Radiology" with an appropriate callback number    For questions about scheduling during appropriate work hours, call IR :  [facility]: [phone]  [facility]: [phone]    For outpatient IR booking:  [facility]: [phone]  [facility]: [phone]

## 2024-11-22 ENCOUNTER — TRANSCRIPTION ENCOUNTER (OUTPATIENT)
Age: 29
End: 2024-11-22

## 2024-11-22 VITALS
SYSTOLIC BLOOD PRESSURE: 143 MMHG | RESPIRATION RATE: 18 BRPM | OXYGEN SATURATION: 100 % | TEMPERATURE: 98 F | HEART RATE: 101 BPM | DIASTOLIC BLOOD PRESSURE: 95 MMHG

## 2024-11-22 LAB
ANION GAP SERPL CALC-SCNC: 11 MMOL/L — SIGNIFICANT CHANGE UP (ref 7–14)
ANISOCYTOSIS BLD QL: SLIGHT — SIGNIFICANT CHANGE UP
APTT BLD: 25 SEC — SIGNIFICANT CHANGE UP (ref 24.5–35.6)
AUTO DIFF PNL BLD: ABNORMAL
BASOPHILS # BLD AUTO: 0.04 K/UL — SIGNIFICANT CHANGE UP (ref 0–0.2)
BASOPHILS NFR BLD AUTO: 0.9 % — SIGNIFICANT CHANGE UP (ref 0–2)
BUN SERPL-MCNC: 13 MG/DL — SIGNIFICANT CHANGE UP (ref 7–23)
C-ANCA SER-ACNC: NEGATIVE — SIGNIFICANT CHANGE UP
CALCIUM SERPL-MCNC: 8.6 MG/DL — SIGNIFICANT CHANGE UP (ref 8.4–10.5)
CHLORIDE SERPL-SCNC: 105 MMOL/L — SIGNIFICANT CHANGE UP (ref 98–107)
CO2 SERPL-SCNC: 20 MMOL/L — LOW (ref 22–31)
CREAT SERPL-MCNC: 1.26 MG/DL — SIGNIFICANT CHANGE UP (ref 0.5–1.3)
EGFR: 59 ML/MIN/1.73M2 — LOW
EOSINOPHIL # BLD AUTO: 0 K/UL — SIGNIFICANT CHANGE UP (ref 0–0.5)
EOSINOPHIL NFR BLD AUTO: 0 % — SIGNIFICANT CHANGE UP (ref 0–6)
GLUCOSE SERPL-MCNC: 103 MG/DL — HIGH (ref 70–99)
HCT VFR BLD CALC: 28.2 % — LOW (ref 34.5–45)
HGB BLD-MCNC: 9.1 G/DL — LOW (ref 11.5–15.5)
IANC: 2.48 K/UL — SIGNIFICANT CHANGE UP (ref 1.8–7.4)
INR BLD: 1.06 RATIO — SIGNIFICANT CHANGE UP (ref 0.85–1.16)
LYMPHOCYTES # BLD AUTO: 1.09 K/UL — SIGNIFICANT CHANGE UP (ref 1–3.3)
LYMPHOCYTES # BLD AUTO: 23.9 % — SIGNIFICANT CHANGE UP (ref 13–44)
MACROCYTES BLD QL: SLIGHT — SIGNIFICANT CHANGE UP
MAGNESIUM SERPL-MCNC: 1.5 MG/DL — LOW (ref 1.6–2.6)
MANUAL SMEAR VERIFICATION: SIGNIFICANT CHANGE UP
MCHC RBC-ENTMCNC: 31.9 PG — SIGNIFICANT CHANGE UP (ref 27–34)
MCHC RBC-ENTMCNC: 32.3 G/DL — SIGNIFICANT CHANGE UP (ref 32–36)
MCV RBC AUTO: 98.9 FL — SIGNIFICANT CHANGE UP (ref 80–100)
MONOCYTES # BLD AUTO: 0.33 K/UL — SIGNIFICANT CHANGE UP (ref 0–0.9)
MONOCYTES NFR BLD AUTO: 7.3 % — SIGNIFICANT CHANGE UP (ref 2–14)
MPO AB + PR3 PNL SER: SIGNIFICANT CHANGE UP
MYELOCYTES NFR BLD: 0.9 % — HIGH (ref 0–0)
NEUTROPHILS # BLD AUTO: 2.86 K/UL — SIGNIFICANT CHANGE UP (ref 1.8–7.4)
NEUTROPHILS NFR BLD AUTO: 62.4 % — SIGNIFICANT CHANGE UP (ref 43–77)
OVALOCYTES BLD QL SMEAR: SLIGHT — SIGNIFICANT CHANGE UP
P-ANCA SER-ACNC: NEGATIVE — SIGNIFICANT CHANGE UP
PHOSPHATE SERPL-MCNC: 3.8 MG/DL — SIGNIFICANT CHANGE UP (ref 2.5–4.5)
PLAT MORPH BLD: NORMAL — SIGNIFICANT CHANGE UP
PLATELET # BLD AUTO: 174 K/UL — SIGNIFICANT CHANGE UP (ref 150–400)
PLATELET COUNT - ESTIMATE: NORMAL — SIGNIFICANT CHANGE UP
POIKILOCYTOSIS BLD QL AUTO: SLIGHT — SIGNIFICANT CHANGE UP
POLYCHROMASIA BLD QL SMEAR: SLIGHT — SIGNIFICANT CHANGE UP
POTASSIUM SERPL-MCNC: 4.2 MMOL/L — SIGNIFICANT CHANGE UP (ref 3.5–5.3)
POTASSIUM SERPL-SCNC: 4.2 MMOL/L — SIGNIFICANT CHANGE UP (ref 3.5–5.3)
PROTHROM AB SERPL-ACNC: 12.6 SEC — SIGNIFICANT CHANGE UP (ref 9.9–13.4)
RBC # BLD: 2.85 M/UL — LOW (ref 3.8–5.2)
RBC # FLD: 13.3 % — SIGNIFICANT CHANGE UP (ref 10.3–14.5)
RBC BLD AUTO: ABNORMAL
SMUDGE CELLS # BLD: PRESENT — SIGNIFICANT CHANGE UP
SODIUM SERPL-SCNC: 136 MMOL/L — SIGNIFICANT CHANGE UP (ref 135–145)
VARIANT LYMPHS # BLD: 4.6 % — SIGNIFICANT CHANGE UP (ref 0–6)
WBC # BLD: 4.58 K/UL — SIGNIFICANT CHANGE UP (ref 3.8–10.5)
WBC # FLD AUTO: 4.58 K/UL — SIGNIFICANT CHANGE UP (ref 3.8–10.5)

## 2024-11-22 PROCEDURE — 99239 HOSP IP/OBS DSCHRG MGMT >30: CPT

## 2024-11-22 PROCEDURE — 99232 SBSQ HOSP IP/OBS MODERATE 35: CPT

## 2024-11-22 RX ORDER — LOSARTAN POTASSIUM 100 MG/1
1 TABLET, FILM COATED ORAL
Qty: 30 | Refills: 0
Start: 2024-11-22 | End: 2024-12-21

## 2024-11-22 RX ORDER — LOSARTAN POTASSIUM 100 MG/1
25 TABLET, FILM COATED ORAL DAILY
Refills: 0 | Status: DISCONTINUED | OUTPATIENT
Start: 2024-11-22 | End: 2024-11-22

## 2024-11-22 RX ADMIN — Medication 100 GRAM(S): at 04:14

## 2024-11-22 RX ADMIN — METOPROLOL TARTRATE 100 MILLIGRAM(S): 100 TABLET, FILM COATED ORAL at 05:59

## 2024-11-22 RX ADMIN — METRONIDAZOLE 100 MILLIGRAM(S): 500 TABLET ORAL at 06:54

## 2024-11-22 RX ADMIN — DOXYCYCLINE HYCLATE 100 MILLIGRAM(S): 150 TABLET, COATED ORAL at 06:54

## 2024-11-22 RX ADMIN — Medication 100 GRAM(S): at 05:56

## 2024-11-22 NOTE — PROGRESS NOTE ADULT - SUBJECTIVE AND OBJECTIVE BOX
Follow Up:      Interval History/ROS:  afebrile   feels okay  planning d/c home today      undergoing w/u SLE nephritis     Allergies  No Known Allergies      doxycycline IVPB 100 every 12 hours  metroNIDAZOLE  IVPB 500 every 12 hours  metroNIDAZOLE  IVPB      MEDICATIONS  (STANDING):  acetaminophen     Tablet .. 650 every 6 hours PRN  cetirizine 10 daily  guaiFENesin Oral Liquid (Sugar-Free) 100 every 6 hours PRN  influenza   Vaccine 0.5 once  losartan 25 daily  melatonin 3 at bedtime PRN  metoprolol succinate  daily    Vital Signs Last 24 Hrs  T(F): 98.1 (11-22-24 @ 12:46), Max: 98.6 (11-21-24 @ 22:11)  HR: 101 (11-22-24 @ 12:46)  BP: 143/95 (11-22-24 @ 12:46)  RR: 18 (11-22-24 @ 12:46)  SpO2: 100% (11-22-24 @ 12:46) (100% - 100%)      PHYSICAL EXAM:  Constitutional: No distress  Eyes: No icterus.  Oral cavity: Clear, no lesions  Neck: Supple  RS: Chest clear   CVS: no respiratory distress RA  Abdomen: Soft. No guarding/rigidity/tenderness.  : no pearl  Skin: No lesions noted  Neuro: Alert, oriented to time/place/person  Cranial nerves 2-12 grossly normal. No focal abnormalities                                     9.1    4.58  )-----------( 174      ( 22 Nov 2024 01:10 )             28.2 11-22    136  |  105  |  13  ----------------------------<  103  4.2   |  20  |  1.26  Ca    8.6      22 Nov 2024 01:10Phos  3.8     11-22Mg     1.50     11-22        Chlamydia trachomatis/Neisseria gonorrhoeae, Urine (11.19.24 @ 16:06)   GC Amplification Result: NotDete:      MICROBIOLOGY:  v  .Blood BLOOD  11-15-24   No growth   --  --      .Blood BLOOD  11-15-24   No growth  --  --      Clean Catch  11-15-24   <10,000 CFU/mL Normal Urogenital Charu  --  --      Clean Catch  11-10-24   <10,000 CFU/mL Normal Urogenital Charu  --  --        Rapid RVP Result: NotDetec (11-15 @ 21:51)        RADIOLOGY:    d< from: US Transvaginal (11.15.24 @ 21:29) >    ACC: [id number] EXAM:  US TRANSVAGINAL   ORDERED BY: ABDIAS DAVIS     PROCEDURE DATE:  11/15/2024          INTERPRETATION:  CLINICAL INFORMATION: Lower abdominal pain. Negative   beta hCG.    LMP: 10/20/2024.    COMPARISON: CT abdomen and pelvis 11/15/2024.    TECHNIQUE:  Endovaginal and transabdominal pelvic sonogram. Color and Spectral   Doppler was performed.    FINDINGS:  Uterus: 10.0 cm x 5.5 cm x 5.9 cm. Leiomyomatous uterus. For reference:   An anterior subserosal fibroid measuring 2.9 x2.9 x 2.8 cm, the lower   uterine segment fibroid measuring 3.6 x 2.9 x 2.8 cm, a pedunculated   subserosal fibroid measuring 2.1 x 2.2 x 2.2 cm.  Endometrium: 7 mm. Within normal limits.    Right ovary: 5.6 cm x 2.8 cm x 2.4 cm. Echogenic lesion measuring 1.0 x   1.1 x 1.0 cm. Normal arterial and venous waveforms.  Left ovary: 2.7 cm x 1.5 cm x 2.2 cm. Within normal limits. Normal   arterial and venous waveforms.    Fluid: Trace fluid.    IMPRESSION:  No sonographic evidence of ovarian torsion.    Fibroid uterus.    A 1.1 cm echogenic left ovarian lesion may represent a hemorrhagic cyst   or corpus albicans.    --- End of Report ---      < end of copied text >  < from: CT Abdomen and Pelvis w/ IV Cont (11.15.24 @ 18:07) >    ACC: [id number] EXAM:  CT ABDOMEN AND PELVIS IC   ORDERED BY: ABDIAS DAVIS     PROCEDURE DATE:  11/15/2024          INTERPRETATION:  CLINICAL INFORMATION: Abdominal pain, Fever and chills   for 2 weeks.    COMPARISON: Abdominal radiograph 10/27/2021. Abdominal ultrasound   3/27/2011. CT chest 11/10/2024.    CONTRAST/COMPLICATIONS:  IV Contrast: Omnipaque 350  90 cc administered   10 cc discarded  Oral Contrast: NONE      PROCEDURE:  CT of the Abdomen and Pelvis was performed.  Sagittal and coronal reformats were performed.    FINDINGS:  LOWER CHEST: Within normal limits.    LIVER: Within normal limits.  BILE DUCTS: Normal caliber.  GALLBLADDER: Within normal limits.  SPLEEN: Partially loculated perisplenic fluid tracking along the left   paracolic gutter.  PANCREAS: Within normal limits.  ADRENALS: Within normal limits.  KIDNEYS/URETERS: Within normal limits.    BLADDER: Within normal limits.  REPRODUCTIVE ORGANS: Multiple heterogeneous uterine masses likely   representing fibroids. Questionable masslike hypoattenuation of the   cervical region, which may represent normal appearance of the cervix   versus an underlying mass.    BOWEL: No bowel obstruction. Appendix is normal.  PERITONEUM/RETROPERITONEUM: Small volume perisplenic and pelvic ascites.  VESSELS: Within normal limits.  LYMPH NODES: Right lower quadrant, anterior to the psoas muscle (301   -60), 1.7 x 1.3 cm nodule or lymph node. Additional nodule/node at the   level of the aortic bifurcation (301-59), 1.7 x 1 cm.  ABDOMINAL WALL: Tiny fat-containing right inguinal hernia.  BONES: Within normal limits.    IMPRESSION:  Partially loculated perisplenic fluid tracking along the left paracolic   gutter.    Multiple heterogeneous uterine masses likely representing fibroids.   Questionable masslike hypoattenuation of the cervical region, which may   represent normal appearance of the cervix, however an underlying mass   cannot be excluded. Recommend further evaluation with pelvic ultrasound   or nonemergent contrast-enhanced pelvic MRI.    Nonspecific soft tissue nodules or lymph nodes in the right lower   quadrant and at the level of the aortic bifurcation.        --- End of Report ---      < end of copied text >    < from: MR Pelvis w/wo IV Cont (11.19.24 @ 21:15) >    IMPRESSION:  Multiple fibroids. Normal appearance of the cervix.  A small enhancing lesion anterior to the left ovary, uncertain if it is   part of the left ovary lower within the adnexa. This may represent an   adnexal/ovarian mass or a benign finding like an involuting corpus   luteum. A pelvic MRI within 2 months is advised to reassess this finding.    --- End of Report ---    < end of copied text >

## 2024-11-22 NOTE — DIETITIAN INITIAL EVALUATION ADULT - ADD RECOMMEND
- Advance diet to low sodium, defer consistencies to team when medically feasible  - Monitor PO intake, tolerance to diet/supplement, nutrition related lab values, weight trends, BMs/GI distress, hydration status, skin integrity

## 2024-11-22 NOTE — DISCHARGE NOTE NURSING/CASE MANAGEMENT/SOCIAL WORK - NSTRANSFERBELONGINGSDISPO_GEN_A_NUR
with patient Destruction After The Procedure: No Additional Anesthesia Volume In Cc (Will Not Render If 0): 0 Anesthesia Type: 1% lidocaine with 1:100,000 epinephrine and a 1:10 solution of 8.4% sodium bicarbonate Lab Facility: [id number] Cryotherapy Text: The wound bed was treated with cryotherapy after the biopsy was performed. Post-Care Instructions: I reviewed with the patient in detail post-care instructions. Patient is to keep the biopsy site dry overnight, and then apply bacitracin twice daily until healed. Patient may apply hydrogen peroxide soaks to remove any crusting. Electrodesiccation Text: The wound bed was treated with electrodesiccation after the biopsy was performed. Notification Instructions: Patient will be notified of biopsy results. However, patient instructed to call the office if not contacted within 2 weeks. Detail Level: Detailed Wound Care: Petrolatum Body Location Override (Optional - Billing Will Still Be Based On Selected Body Map Location If Applicable): left medial chest Was A Bandage Applied: Yes Anesthesia Volume In Cc (Will Not Render If 0): 0.5 Biopsy Type: H and E Electrodesiccation And Curettage Text: The wound bed was treated with electrodesiccation and curettage after the biopsy was performed. Type Of Destruction Used: Curettage Consent: Written consent was obtained and risks were reviewed including but not limited to scarring, infection, bleeding, scabbing, incomplete removal, nerve damage and allergy to anesthesia. Dressing: bandage Depth Of Biopsy: dermis Hemostasis: Drysol Silver Nitrate Text: The wound bed was treated with silver nitrate after the biopsy was performed. Billing Type: Third-Party Bill Biopsy Method: Dermablade Lab: [id number] Curettage Text: The wound bed was treated with curettage after the biopsy was performed.

## 2024-11-22 NOTE — PROGRESS NOTE ADULT - ASSESSMENT
29 y.o. female with PHM HTN presented 11/15 with lower abdominal pain, fever and chills for the past 2 weeks    Assessment:  #Fever  #Abdominal pain  -UTI s/p cefpodoxime 200mg BID treatment at home for UTI  Admitted with persistent low grade fever despite antibiotics   -UA+, BCX no growth   -HIV neg   -CT A/P Partially loculated perisplenic fluid tracking along the left paracolic gutter.  Multiple heterogeneous uterine masses likely representing fibroids. Questionable masslike hypoattenuation of the cervical region, which may represent normal appearance of the cervix, however an underlying mass cannot be excluded.  Nonspecific soft tissue nodules or lymph nodes in the right lower   quadrant and at the level of the aortic bifurcation.  -Transvaginal US: No sonographic evidence of ovarian torsion. Fibroid uterus. A 1.1 cm echogenic left ovarian lesion may represent a hemorrhagic cyst or corpus albicans.      -fever likely 2/2  source; now afebrile 11/17    urine GC, chlamydia not detected     blood cultures neg     abdominal pain improved   - SLE w/u with rheumatology   - EBV serology consistent with remote infection not acute     ceftriaxone, doxy, flagyl 11/15-    planning d/c today    Suggest;    - doxycycline 100 mg po q 12 h and flagyl 500 mg po q 12 h  --> 11/28       given my contact info for f/u

## 2024-11-22 NOTE — PROGRESS NOTE ADULT - REASON FOR ADMISSION
Abdominal Pain, Fever

## 2024-11-22 NOTE — DISCHARGE NOTE NURSING/CASE MANAGEMENT/SOCIAL WORK - NSDCFUADDAPPT_GEN_ALL_CORE_FT
APPTS ARE READY TO BE MADE: [X ] YES    Best Family or Patient Contact (if needed):    Additional Information about above appointments (if needed):    1: Rheumatology (Dr. Wayne Nagy)  2: Nephrology (Dr. Gianna Paula)  3: Interventional Radiology for kidney biopsy  4. PCP for general health maintenance    Other comments or requests:

## 2024-11-22 NOTE — PROGRESS NOTE ADULT - PROBLEM SELECTOR PLAN 6
repeat UA 11/19 with proteinuria and hematuria, +WBCs, trace LE, neg nitrites, bilirubin, and bacteria    Plan:  - abx as above  -  urine cx <10K CFU

## 2024-11-22 NOTE — DISCHARGE NOTE NURSING/CASE MANAGEMENT/SOCIAL WORK - FINANCIAL ASSISTANCE
[facility] provides services at a reduced cost to those who are determined to be eligible through [facility]’s financial assistance program. Information regarding [facility]’s financial assistance program can be found by going to [email or web address] or by calling [phone].

## 2024-11-22 NOTE — DIETITIAN INITIAL EVALUATION ADULT - PERTINENT LABORATORY DATA
11-22    136  |  105  |  13  ----------------------------<  103[H]  4.2   |  20[L]  |  1.26    Ca    8.6      22 Nov 2024 01:10  Phos  3.8     11-22  Mg     1.50     11-22    A1C with Estimated Average Glucose Result: 5.4 % (11-17-24 @ 06:53)

## 2024-11-22 NOTE — DIETITIAN INITIAL EVALUATION ADULT - PROBLEM SELECTOR PLAN 1
Differential includes GYN etiology (ovarian torsion  vs. ovarian cyst rupture/leakage vs. endometriosis vs. PID vs. fibroid)  vs. GI etiology (gastroenteritis)  vs.  etiology (UTI vs. pyelonephritis)     - Patient febrile, chills, nausea. No diarrhea.  - Hcg negative  - CT abdomen reveals partially loculated perisplenic fluid. Multiple uterine masses likely representing fibroids. Nonspecific soft tissue nodules or lymph nodes in RLQ and level of aortic bifurfication.  - Patient was seen by Ob/gyn in ED, low clinical suspicion for PID at this time given no CMT.  - TVUS reveals  > 1.1 cm echogenic left ovarian lesion may represent a hemorrhagic cyst   or corpus albicans. Fibroid uterus.  Plan  - urine GC/CT  - f/u urine culture, blood cx.  - PO or IV pain meds for relief  - No acute GYN intervention at this time   -  Ceftriaxone 1g IV daily, Doxycycline 100 mg BID x 14 days +/-  Flagyl 500 mg BID x 14 days for possible PID

## 2024-11-22 NOTE — PROGRESS NOTE ADULT - PROBLEM SELECTOR PLAN 1
normal wbc clinically unknown source: GYN vs  vs perisplenic loculated ascites  UTI s/p cefpodoxime 200mg BID treatment at home for UTI, last dose today,   11/10 [facility] Urine culture showed normal urogenital oanh  11/10 CT/PE bl axillary lymphadenopathy   11/15 CT abdomen reveals partially loculated perisplenic fluid. Multiple uterine masses likely representing fibroids. Nonspecific soft tissue nodules or lymph nodes in RLQ and level of aortic bifurcation.  11/17 -ve HIV, GC, chlamydia, trichomonas, syphilis, blood clx NGTD, uclx wnl  11/20 low C3 and C4, positive dsDNA and anti-RNP. UA with proteinuria and hematuria, trace LE, neg nitrites and bacteria. procal 0.12.   - likely iso of newly diagnosed SLE  Plan:  - rheum consulted recommend kidney biopsy  - f/u lupus anticoagulant profile  - f/u 24hr urine collection  - empirical PID coverage/: CTX, Doxy 100mg BID 14d, flagyl 500mg BID 14d if no other source per GYN.   - ID recs appreciated; rec c/w current regimen. suspect +EBV, remote infection (not acute).  - IR consulted for perisplenic loculated ascites: will consider CT drainage vs US guided drainage. US Abd 11/18 shows small volume perisplenic ascites, unchanged from 11/15. normal wbc clinically unknown source: GYN vs  vs perisplenic loculated ascites  UTI s/p cefpodoxime 200mg BID treatment at home for UTI, last dose today,   11/10 [facility] Urine culture showed normal urogenital oanh  11/10 CT/PE bl axillary lymphadenopathy   11/15 CT abdomen reveals partially loculated perisplenic fluid. Multiple uterine masses likely representing fibroids. Nonspecific soft tissue nodules or lymph nodes in RLQ and level of aortic bifurcation.  11/17 -ve HIV, GC, chlamydia, trichomonas, syphilis, blood clx NGTD, uclx wnl  11/20 low C3 and C4, positive dsDNA and anti-RNP. 24 hour urine >3.5 g protein. UA with proteinuria and hematuria, trace LE, neg nitrites and bacteria. procal 0.12.   - likely iso of newly diagnosed SLE  -11/21 [place] Ab, anti-RNP, anti SSA Ab, anti SSB Ab are positive  -11/21 lupus anticoagulant profile is negative   -11/21 Hep A, B, and C antibodies nonreactive  -11/21 Anticardiolipin and Beta 2 Glycoprotein 1 antibodies negative    Plan:  - rheum consulted recommend kidney biopsy  - empirical PID coverage/: CTX, Doxy 100mg BID 14d, flagyl 500mg BID 14d if no other source per GYN.   - ID recs appreciated; rec c/w current regimen. suspect +EBV, remote infection (not acute).  - IR consulted for perisplenic loculated ascites: will consider CT drainage vs US guided drainage. US Abd 11/18 shows small volume perisplenic ascites, unchanged from 11/15.

## 2024-11-22 NOTE — PROGRESS NOTE ADULT - PROBLEM SELECTOR PLAN 2
Differential includes GYN etiology ruptured ovarian cyst vs PMD/fibroid vs. endometriosis vs infectious causes PID vs  pyelonephritis  BHCG -ve  11/15 CT abdomen reveals partially loculated perisplenic fluid. Multiple uterine masses likely representing fibroids. Nonspecific soft tissue nodules or lymph nodes in RLQ and level of aortic bifurcation.  Patient was seen by Ob/gyn in ED, low clinical suspicion for PID at this time given no CMT.  11/15 TVUS -ve for torsion; > 1.1 cm echogenic left ovarian lesion may represent a hemorrhagic cyst or corpus albicans. Fibroid uterus.  11/19 MR pelvis: small enhancing lesion anterior to left ovary representing adnexal/ovarian mass vs involuting corpus luteum.     Plan   - f/u pelvic MRI in 2 months to reassess  - workup as above  - Gyn consulted on admission, no acute intervention Differential includes GYN etiology ruptured ovarian cyst vs PMD/fibroid vs. endometriosis vs infectious causes PID vs  pyelonephritis  BHCG -ve  11/15 CT abdomen reveals partially loculated perisplenic fluid. Multiple uterine masses likely representing fibroids. Nonspecific soft tissue nodules or lymph nodes in RLQ and level of aortic bifurcation.  Patient was seen by Ob/gyn in ED, low clinical suspicion for PID at this time given no CMT.  11/15 TVUS -ve for torsion; > 1.1 cm echogenic left ovarian lesion may represent a hemorrhagic cyst or corpus albicans. Fibroid uterus.  11/19 MR pelvis: small enhancing lesion anterior to left ovary representing adnexal/ovarian mass vs involuting corpus luteum.     Plan   - OP f/u pelvic MRI in 2 months to reassess  - workup as above  - Gyn consulted on admission, no acute intervention

## 2024-11-22 NOTE — PROGRESS NOTE ADULT - ATTENDING COMMENTS
I have personally seen and examined patient on the above date. I discussed the case with resident and I agree with the findings and plan as detailed per note above, which I have amended where appropriate.    Ms. Garza is a 30 y/o F with PMH of HTN who presented to [facility] with ongoing worsening lower abd pain, fevers, chills for the past 2 weeks, previously seen in [facility] ED on 24, found with UTI, discharged on cefpodoxime, presented back to [facility] ED on 11/10 with similar sx and discharged from ED and now admitted due to persistent nature of the abdominal pain for further evaluation.     Pt seen and examined at bedside this morning. Abdominal pain resolved. Tolerating diet. EBV serologies reviewed, d/w ID and report it is remote infection and to c/w current abx therapy. No cp, sob, fevers, chills, abd pain, melena/hematochezia. +BM. Labs and vitals reviewed.    #SIRS + on admission   - Fever, tachycardic on admission. No clear source of infection. Likely secondary to lupus flare  - Afebrile x 5 days  - Pt developed RLE rash. Serology c/w lupus flare  - Rheum consulted but pt prefers to follow up outpt with her own nephrologist    #Nephrotic syndrome  24h urine protein >3.5g, albumin 2.8 + B/L lower extremity edema  Likely secondary to lupus nephritis  Nephrology consulted and rec kidney biopsy but pt deferring to outpatient      #Lower abd pain   #Fibroids   - Pain resolved  - CT AP notable for: loculated perisplenic fluid along with L paracolic gutter, multiple heterogenous uterine masses likely fibroids, nonspecific soft tissue nodules/lymph nodes in RLQ and at the level of aortic bifurcation    - TVUS on 11/15 -> negative for torsion, fibroid uterus, 1.1cm echogenic L ovarian torsion may represent a hemorrhagic cyst or corpus albicans   - GYN consulted in ED, TVUS as above, rec tx for possible PID  - UA on 11/10 notable for: cloudy, orange urine, trace LE, +bacteria, WBC 17, +blood, CFU <10K. RVP negative.   - c/w CTX/Doxycycline/flagyl. EBV labs reviewed and per ID, suspect remote infection. syphilis screen negative   - F.u BCX = NGTD thus far   - HIV negative   - GC chlamydia negative  - MRI pelvis showed " A small enhancing lesion anterior to the left ovary, uncertain if it is part of the left ovary lower within the adnexa. This may represent an adnexal/ovarian mass or a benign finding like an involuting corpus luteum"  - Will need repeat MRI in 2months. Pt informed      #HTN  - c/w home metoprolol for now, monitor vitals closely    #SILVESTRE   - Likely due to lupus nephritis. Pt will need biopsy to confirm  - f/u nephrology outpatient     #Proteinuria  - noted to have proteinuria previously, urine protein      Time spent on discharge planninmin

## 2024-11-22 NOTE — PROGRESS NOTE ADULT - PROVIDER SPECIALTY LIST ADULT
Infectious Disease
Infectious Disease
Internal Medicine
Internal Medicine
Infectious Disease
Rheumatology
Infectious Disease
Infectious Disease
Internal Medicine

## 2024-11-22 NOTE — PROGRESS NOTE ADULT - ASSESSMENT
29F with PMH of HTN, Sjogren's presenting with lower abdominal pain, fever and chills for the past 2 weeks. Evaluated by GYN at the ED, working ddx GYN vs  vs perisplenic ascites, Started on CTX/Doxy/Flagyl (11/15-).      Relevant hx:  - s/p Cefpodoxime from suspected UTI from [facility],   - re-presented to [facility] 11/10 for similar symptoms; CT PE showed perisplenic ascites and b/l axillary lymphadenopathy

## 2024-11-22 NOTE — CHART NOTE - NSCHARTNOTEFT_GEN_A_CORE
Patient was reviewed in huddle today with elevated bp in multiple readings. Goal bp for at least 24 hours prior to and 24 hours post renal biopsy is 150/90 needs to be followed to decrease risk of bleeding post procedure.   If patient can stay as inpatient and achieve the above bp goal over the weekend, can tentatively plan for renal biopsy on Monday.   If not, procedure can be scheduled as outpatient by contacting the outpatient IR office via phone or email.     --  Vivi George MD  Interventional Radiology Resident (PGY-6)    For EMERGENT inquiries/questions:  IR Pager ([facility]): [phone]  IR Pager ([facility]): [phone] ; [phone]    For non-emergent consults/questions:   Please place a sunrise order "Consult- Interventional Radiology" with an appropriate callback number    For questions about scheduling during appropriate work hours, call IR :  [facility]: [phone]  [facility]: [phone]    For outpatient IR booking:  [facility]: [phone]  [facility]: [phone] or email [email or web address]

## 2024-11-22 NOTE — DIETITIAN INITIAL EVALUATION ADULT - ORAL INTAKE PTA/DIET HISTORY
Pt. awake and alert. No acute distress. Lungs CTA b/l. Abdomen soft/NT. I, Dr. Calloway, performed a face to face bedside interview with this patient regarding history of present illness, review of symptoms and relevant past medical, social and family history.  I completed an independent physical examination.  I have also reviewed the ACP's note(s) and discussed the plan with the ACP. Patient seen for assessment. Reports decreased appetite x 2 weeks prior to admission due to pain. No special diet followed.

## 2024-11-22 NOTE — PROGRESS NOTE ADULT - SUBJECTIVE AND OBJECTIVE BOX
PROGRESS NOTE:     Patient is a 29y old  Female who presents with a chief complaint of Abdominal Pain, Fever (21 Nov 2024 15:48)      SUBJECTIVE / OVERNIGHT EVENTS:    OVERNIGHT: No acute overnight events.      Patient was examined at bedside and feels well this morning. Denies fever, chills, chest pain, SOB, nausea, vomiting. ROS otherwise negative and pt is amenable to current treatment plan.      REVIEW OF SYSTEMS:    CONSTITUTIONAL:  No weakness, fevers, or chills  EYES/ENT:  No visual changes, vertigo, or throat pain   NECK:  No pain or stiffness  RESPIRATORY:  No SOB, cough, wheezing, or hemoptysis  CARDIOVASCULAR:  No chest pain or palpitations  GASTROINTESTINAL:  No abdominal pain, nausea, vomiting, or hematemesis; No diarrhea or constipation; No melena or hematochezia.  GENITOURINARY:  No dysuria, change in frequency, or hematuria  NEUROLOGICAL:  No numbness or weakness  SKIN:  No itching or rashes      MEDICATIONS  (STANDING):  cetirizine 10 milliGRAM(s) Oral daily  doxycycline IVPB 100 milliGRAM(s) IV Intermittent every 12 hours  enoxaparin Injectable 40 milliGRAM(s) SubCutaneous every 24 hours  influenza   Vaccine 0.5 milliLiter(s) IntraMuscular once  metoprolol succinate  milliGRAM(s) Oral daily  metroNIDAZOLE  IVPB 500 milliGRAM(s) IV Intermittent every 12 hours  metroNIDAZOLE  IVPB      multivitamin 1 Tablet(s) Oral daily    MEDICATIONS  (PRN):  acetaminophen     Tablet .. 650 milliGRAM(s) Oral every 6 hours PRN Temp greater or equal to 38C (100.4F), Mild Pain (1 - 3)  guaiFENesin Oral Liquid (Sugar-Free) 100 milliGRAM(s) Oral every 6 hours PRN Cough  melatonin 3 milliGRAM(s) Oral at bedtime PRN Insomnia      CAPILLARY BLOOD GLUCOSE        I&O's Summary      PHYSICAL EXAM:  Vital Signs Last 24 Hrs  T(C): 36.8 (22 Nov 2024 05:08), Max: 37.3 (21 Nov 2024 12:23)  T(F): 98.2 (22 Nov 2024 05:08), Max: 99.1 (21 Nov 2024 12:23)  HR: 102 (22 Nov 2024 05:08) (98 - 112)  BP: 145/103 (22 Nov 2024 05:08) (134/89 - 161/100)  BP(mean): --  RR: 17 (22 Nov 2024 05:08) (17 - 18)  SpO2: 100% (22 Nov 2024 05:08) (100% - 100%)    Parameters below as of 22 Nov 2024 05:08  Patient On (Oxygen Delivery Method): room air        CONSTITUTIONAL: NAD; well-developed  HEENT: PERRL, clear conjunctiva  RESPIRATORY: Normal respiratory effort; lungs are clear to auscultation bilaterally; No crackles/rhonchi/wheezing  CARDIOVASCULAR: Regular rate and rhythm, normal S1 and S2, no murmur/rub/gallop; No lower extremity edema; Peripheral pulses are 2+ bilaterally  ABDOMEN: Nontender to palpation, normoactive bowel sounds, no rebound/guarding; No hepatosplenomegaly  MUSCULOSKELETAL: No clubbing or cyanosis of digits; no joint swelling or tenderness to palpation  EXTREMITY: Lower extremities non-tender to palpation; non-erythematous B/L  NEURO: A&Ox3; no focal deficits   PSYCH: Normal mood; affect appropriate    LABS:                        9.1    4.58  )-----------( 174      ( 22 Nov 2024 01:10 )             28.2     11-22    136  |  105  |  13  ----------------------------<  103[H]  4.2   |  20[L]  |  1.26    Ca    8.6      22 Nov 2024 01:10  Phos  3.8     11-22  Mg     1.50     11-22      PT/INR - ( 22 Nov 2024 01:10 )   PT: 12.6 sec;   INR: 1.06 ratio         PTT - ( 22 Nov 2024 01:10 )  PTT:25.0 sec      Urinalysis Basic - ( 22 Nov 2024 01:10 )    Color: x / Appearance: x / SG: x / pH: x  Gluc: 103 mg/dL / Ketone: x  / Bili: x / Urobili: x   Blood: x / Protein: x / Nitrite: x   Leuk Esterase: x / RBC: x / WBC x   Sq Epi: x / Non Sq Epi: x / Bacteria: x          RADIOLOGY & ADDITIONAL TESTS:    N/A PROGRESS NOTE:     Patient is a 29y old  Female who presents with a chief complaint of Abdominal Pain, Fever (21 Nov 2024 15:48)      SUBJECTIVE / OVERNIGHT EVENTS:    OVERNIGHT: No acute overnight events. IR kidney biopsy canceled this morning due to BP overnight above preprocedure requirement of 150/90 for 24 hrs before biopsy.    Patient was examined at bedside and feels well this morning. Unchanged dark brown urine, LE edema. Reports that she has not had cough overnight. Denies fever, chills, chest pain, SOB, nausea, vomiting , weakness, dysuria, or swollen joints. ROS otherwise negative.        REVIEW OF SYSTEMS:    CONSTITUTIONAL:  No weakness, fevers, or chills  EYES/ENT:  No visual changes, vertigo, or throat pain   NECK:  No pain or stiffness  RESPIRATORY:  No SOB, cough, wheezing, or hemoptysis  CARDIOVASCULAR:  No chest pain or palpitations  GASTROINTESTINAL:  No abdominal pain, nausea, vomiting, or hematemesis; No diarrhea or constipation; No melena or hematochezia.  GENITOURINARY:  Dark brown urine. No dysuria or change in frequency  NEUROLOGICAL:  No numbness or weakness      MEDICATIONS  (STANDING):  cetirizine 10 milliGRAM(s) Oral daily  doxycycline IVPB 100 milliGRAM(s) IV Intermittent every 12 hours  enoxaparin Injectable 40 milliGRAM(s) SubCutaneous every 24 hours  influenza   Vaccine 0.5 milliLiter(s) IntraMuscular once  metoprolol succinate  milliGRAM(s) Oral daily  metroNIDAZOLE  IVPB 500 milliGRAM(s) IV Intermittent every 12 hours  metroNIDAZOLE  IVPB      multivitamin 1 Tablet(s) Oral daily    MEDICATIONS  (PRN):  acetaminophen     Tablet .. 650 milliGRAM(s) Oral every 6 hours PRN Temp greater or equal to 38C (100.4F), Mild Pain (1 - 3)  guaiFENesin Oral Liquid (Sugar-Free) 100 milliGRAM(s) Oral every 6 hours PRN Cough  melatonin 3 milliGRAM(s) Oral at bedtime PRN Insomnia        PHYSICAL EXAM:  Vital Signs Last 24 Hrs  T(C): 36.8 (22 Nov 2024 05:08), Max: 37.3 (21 Nov 2024 12:23)  T(F): 98.2 (22 Nov 2024 05:08), Max: 99.1 (21 Nov 2024 12:23)  HR: 102 (22 Nov 2024 05:08) (98 - 112)  BP: 145/103 (22 Nov 2024 05:08) (134/89 - 161/100)  BP(mean): --  RR: 17 (22 Nov 2024 05:08) (17 - 18)  SpO2: 100% (22 Nov 2024 05:08) (100% - 100%)    Parameters below as of 22 Nov 2024 05:08  Patient On (Oxygen Delivery Method): room air        CONSTITUTIONAL: NAD; well-developed  HEENT: PERRL, clear conjunctiva  RESPIRATORY: Normal respiratory effort; lungs are clear to auscultation bilaterally; No crackles/rhonchi/wheezing  CARDIOVASCULAR: Regular rate and rhythm, normal S1 and S2, no murmur/rub/gallop; Peripheral pulses are 2+ bilaterally  ABDOMEN: Nontender to palpation, normoactive bowel sounds, no rebound/guarding  MUSCULOSKELETAL: No clubbing or cyanosis of digits; no joint swelling or tenderness to palpation  EXTREMITY: Unchanged R lower extremity macular rash. +1 Lower extremity edema b/l; Lower extremities non-tender to palpation B/L.  NEURO: A&Ox3; no focal deficits   PSYCH: Normal mood; affect appropriate    LABS:                        9.1    4.58  )-----------( 174      ( 22 Nov 2024 01:10 )             28.2     11-22    136  |  105  |  13  ----------------------------<  103[H]  4.2   |  20[L]  |  1.26    Ca    8.6      22 Nov 2024 01:10  Phos  3.8     11-22  Mg     1.50     11-22      PT/INR - ( 22 Nov 2024 01:10 )   PT: 12.6 sec;   INR: 1.06 ratio         PTT - ( 22 Nov 2024 01:10 )  PTT:25.0 sec      Urinalysis Basic - ( 22 Nov 2024 01:10 )    Color: x / Appearance: x / SG: x / pH: x  Gluc: 103 mg/dL / Ketone: x  / Bili: x / Urobili: x   Blood: x / Protein: x / Nitrite: x   Leuk Esterase: x / RBC: x / WBC x   Sq Epi: x / Non Sq Epi: x / Bacteria: x          RADIOLOGY & ADDITIONAL TESTS:    N/A

## 2024-11-22 NOTE — PROGRESS NOTE ADULT - PROBLEM SELECTOR PLAN 7
- c/w home metoprolol 100mg daily - c/w home metoprolol 100mg daily  - 11/22 adding Losartan 25 as above

## 2024-11-22 NOTE — DIETITIAN INITIAL EVALUATION ADULT - PERTINENT MEDS FT
MEDICATIONS  (STANDING):  cetirizine 10 milliGRAM(s) Oral daily  doxycycline IVPB 100 milliGRAM(s) IV Intermittent every 12 hours  influenza   Vaccine 0.5 milliLiter(s) IntraMuscular once  losartan 25 milliGRAM(s) Oral daily  metoprolol succinate  milliGRAM(s) Oral daily  metroNIDAZOLE  IVPB 500 milliGRAM(s) IV Intermittent every 12 hours  metroNIDAZOLE  IVPB      multivitamin 1 Tablet(s) Oral daily    MEDICATIONS  (PRN):  acetaminophen     Tablet .. 650 milliGRAM(s) Oral every 6 hours PRN Temp greater or equal to 38C (100.4F), Mild Pain (1 - 3)  guaiFENesin Oral Liquid (Sugar-Free) 100 milliGRAM(s) Oral every 6 hours PRN Cough  melatonin 3 milliGRAM(s) Oral at bedtime PRN Insomnia

## 2024-11-22 NOTE — DISCHARGE NOTE NURSING/CASE MANAGEMENT/SOCIAL WORK - PATIENT PORTAL LINK FT
You can access the FollowMyHealth Patient Portal offered by [facility] by registering at the following website: [email or web address]. By joining [employer]’s FollowMyHealth portal, you will also be able to view your health information using other applications (apps) compatible with our system.

## 2024-11-22 NOTE — PROGRESS NOTE ADULT - PROBLEM SELECTOR PLAN 3
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 Pt wants to f/u with IR kidney biopsy as an outpatient; d/cing with IR clinic info for follow up.   - outpatient nephrology follow up

## 2024-11-22 NOTE — DIETITIAN INITIAL EVALUATION ADULT - OTHER INFO
Mirvaso Pregnancy And Lactation Text: This medication has not been assigned a Pregnancy Risk Category. It is unknown if the medication is excreted in breast milk. 29 year old female with a PMH of HTN, Sjogren's presenting with lower abdominal pain, fever and chills for the past 2 weeks, pending biopsy per chart.    Patient NPO for biopsy, which has now been cancelled due to high BP per chart. Patient reporting improved appetite overall. No GI distress reported. Has no food allergies. UBW is around 200 lbs. ABW is 196.4 lbs. (11/15) per chart indicating a -1.8% weight loss. No edema or pressure injuries noted per RN flow sheet.    Patient declined nutrition education.

## 2024-11-24 LAB
GAMMA INTERFERON BACKGROUND BLD IA-ACNC: 0.17 IU/ML — SIGNIFICANT CHANGE UP
M TB IFN-G BLD-IMP: NEGATIVE — SIGNIFICANT CHANGE UP
M TB IFN-G CD4+ BCKGRND COR BLD-ACNC: 0 IU/ML — SIGNIFICANT CHANGE UP
M TB IFN-G CD4+CD8+ BCKGRND COR BLD-ACNC: 0 IU/ML — SIGNIFICANT CHANGE UP
QUANT TB PLUS MITOGEN MINUS NIL: 2.3 IU/ML — SIGNIFICANT CHANGE UP

## 2024-11-25 LAB — G6PD RBC-CCNC: 7.8 U/G HGB — SIGNIFICANT CHANGE UP (ref 7–20.5)

## 2024-11-27 NOTE — CHART NOTE - NSCHARTNOTESELECT_GEN_ALL_CORE
Event Note
Interventional Radiology
Interventional Radiology/Event Note
Interventional Radiology/Event Note
Post-Discharge Note

## 2024-11-27 NOTE — CHART NOTE - NSCHARTNOTEFT_GEN_A_CORE
Post-Discharge Medication Review: Completed	  	  Patient's preferred pharmacy was updated in [facility]: [facility]	  	  Patient contacted to offer medication counseling post-discharge. Medication reconciliation completed. Per patient, medications include:	  	  1.	doxycycline monohydrate 100 mg oral tablet 1 tab(s) orally 2 times a day  2.	losartan 25 mg oral tablet 1 tab(s) orally once a day  3.	metoprolol succinate 100 mg oral tablet, extended release 1 tab(s) orally once a day  4.	metroNIDAZOLE 500 mg oral tablet 1 tab(s) orally 2 times a day Take first pill 11/22 Evening   	  Medication name, indication, administration, side effect, and monitoring reviewed for new medications during post discharge counseling visit with patient. Patient demonstrated understanding. Counseling offered for all medications.	  	  	  	  Grecia Rubin, PharmD	  Clinical Pharmacy Specialist, Pharmacy Telehealth Team	  Can be reached via [employer] Teams or [phone]

## 2024-12-30 NOTE — ED PROVIDER NOTE - TOBACCO USE
Never smoker
General Sunscreen Counseling: I recommended a broad spectrum sunscreen with a SPF of 30 or higher.  I explained that SPF 30 sunscreens block approximately 97 percent of the sun's harmful rays.  Sunscreens should be applied at least 15 minutes prior to expected sun exposure and then every 2 hours after that as long as sun exposure continues. If swimming or exercising sunscreen should be reapplied every 45 minutes to an hour after getting wet or sweating.  One ounce, or the equivalent of a shot glass full of sunscreen, is adequate to protect the skin not covered by a bathing suit. I also recommended a lip balm with a sunscreen as well. Sun protective clothing can be used in lieu of sunscreen but must be worn the entire time you are exposed to the sun's rays.
Detail Level: Zone